# Patient Record
Sex: MALE | Race: WHITE | NOT HISPANIC OR LATINO | Employment: OTHER | URBAN - METROPOLITAN AREA
[De-identification: names, ages, dates, MRNs, and addresses within clinical notes are randomized per-mention and may not be internally consistent; named-entity substitution may affect disease eponyms.]

---

## 2017-08-29 ENCOUNTER — TRANSCRIBE ORDERS (OUTPATIENT)
Dept: ADMINISTRATIVE | Facility: HOSPITAL | Age: 47
End: 2017-08-29

## 2017-08-29 DIAGNOSIS — I42.9 FAMILIAL CARDIOMYOPATHY (HCC): Primary | ICD-10-CM

## 2017-08-30 DIAGNOSIS — I42.9 CARDIOMYOPATHY (HCC): ICD-10-CM

## 2017-09-08 ENCOUNTER — HOSPITAL ENCOUNTER (OUTPATIENT)
Dept: NON INVASIVE DIAGNOSTICS | Facility: CLINIC | Age: 47
Discharge: HOME | End: 2017-09-08
Payer: MEDICARE

## 2017-09-08 DIAGNOSIS — I42.9 CARDIOMYOPATHY (HCC): ICD-10-CM

## 2017-09-08 PROCEDURE — 93306 TTE W/DOPPLER COMPLETE: CPT

## 2017-10-20 ENCOUNTER — ALLSCRIPTS OFFICE VISIT (OUTPATIENT)
Dept: OTHER | Facility: OTHER | Age: 47
End: 2017-10-20

## 2017-11-14 ENCOUNTER — ALLSCRIPTS OFFICE VISIT (OUTPATIENT)
Dept: OTHER | Facility: OTHER | Age: 47
End: 2017-11-14

## 2017-11-14 DIAGNOSIS — I49.3 VENTRICULAR PREMATURE DEPOLARIZATION: ICD-10-CM

## 2017-11-14 DIAGNOSIS — I42.9 CARDIOMYOPATHY (HCC): ICD-10-CM

## 2017-11-14 DIAGNOSIS — E78.5 HYPERLIPIDEMIA: ICD-10-CM

## 2017-11-14 DIAGNOSIS — E03.9 HYPOTHYROIDISM: ICD-10-CM
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
## 2017-11-19 ENCOUNTER — HOSPITAL ENCOUNTER (OUTPATIENT)
Dept: NON INVASIVE DIAGNOSTICS | Facility: HOSPITAL | Age: 47
Discharge: HOME | End: 2017-11-19
Payer: MEDICARE

## 2017-11-19 DIAGNOSIS — I42.9 CARDIOMYOPATHY (HCC): ICD-10-CM

## 2017-11-19 DIAGNOSIS — E03.9 HYPOTHYROIDISM: ICD-10-CM

## 2017-11-19 DIAGNOSIS — E78.5 HYPERLIPIDEMIA: ICD-10-CM

## 2017-11-19 DIAGNOSIS — I49.3 VENTRICULAR PREMATURE DEPOLARIZATION: ICD-10-CM

## 2017-11-19 PROCEDURE — 93225 XTRNL ECG REC<48 HRS REC: CPT

## 2017-11-19 PROCEDURE — 93226 XTRNL ECG REC<48 HR SCAN A/R: CPT

## 2018-01-12 VITALS
SYSTOLIC BLOOD PRESSURE: 102 MMHG | HEIGHT: 69 IN | BODY MASS INDEX: 33.03 KG/M2 | DIASTOLIC BLOOD PRESSURE: 58 MMHG | WEIGHT: 223 LBS | HEART RATE: 60 BPM

## 2018-01-15 NOTE — PROGRESS NOTES
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)   6  Herniated cervical disc (722 0) (M50 20)   7  Hyperlipidemia (272 4) (E78 5)   8  Hypothyroidism (244 9) (E03 9)   9  Myelopathy (336 9) (G95 9)   10  Neck Pain (305 90)   11  Other specified aftercare following surgery (V58 49) (Z48 89)   12  Postoperative examination (V67 00) (Z09)   13  Premature ventricular contraction (427 69) (I49 3)   14  Spondylosis of cervical region without myelopathy or radiculopathy (721 0) (M47 812)   15  Thyroid disorder (246 9) (E07 9)    Current Meds   1  Aspirin 81 MG TABS; TAKE 1 TABLET DAILY; Therapy: (Recorded:18Pwo9536) to Recorded   2  Baclofen 10 MG Oral Tablet; TAKE 1 TABLET 3 TIMES DAILY  Requested for: 93VFI9419; Last Rx:19Jun2014 Ordered   3  Carvedilol 25 MG Oral Tablet; TAKE ONE TABLET BY MOUTH TWICE DAILY WITH    MORNING  AND  EVENING  MEALS; Therapy: 66MWG2377 to 944 12 109)  Requested for: 93VOD1734; Last   Rx:39Ymi5192 Ordered   4  Daily Multiple Vitamins Oral Tablet; TAKE 1 TABLET DAILY; Therapy: (Recorded:50Eyr0819) to Recorded   5  Levothyroxine Sodium 125 MCG Oral Tablet; TAKE 1 TABLET DAILY; Therapy: (Recorded:33Wkw0540) to Recorded   6  Losartan Potassium 25 MG Oral Tablet; TAKE 1 TABLET DAILY  Requested for:   33LJO1139; Last Rx:14Idx3906 Ordered   7  Omega-3-acid Ethyl Esters 1 GM Oral Capsule; TAKE 1 CAPSULES TWICE DAILY    Requested for: 51FMS2511; Last Rx:15Elo1808 Ordered   8   Tylenol Extra Strength 500 MG Oral Tablet; TAKE 1 TABLET EVERY 4 TO 6 HOURS AS   NEEDED; Therapy: (Recorded:23Jro1197) to Recorded    Allergies    1   Darvocet A500 TABS    Vitals  Signs    Heart Rate: 55  Systolic: 92, LUE, Sitting  Diastolic: 64, LUE, Sitting  Height: 5 ft 9 in  Weight: 219 lb   BMI Calculated: 32 34  BSA Calculated: 2 15  O2 Saturation: 99    Plan  Premature ventricular contraction    · EKG/ECG- POC; Status:Complete;   Done: 85FPN5194    Future Appointments    Date/Time Provider Specialty Site   11/14/2017 10:00 AM Jason Guzman DO Cardiology [facility]     Signatures   Electronically signed by : Trina Santiago, ; Oct 20 2017  9:49AM EST                       (Author)    Electronically signed by : Kelly Brito DO; Oct 20 2017  3:17PM EST                       (Author)

## 2018-01-22 VITALS
DIASTOLIC BLOOD PRESSURE: 64 MMHG | HEIGHT: 69 IN | WEIGHT: 219 LBS | OXYGEN SATURATION: 99 % | BODY MASS INDEX: 32.44 KG/M2 | SYSTOLIC BLOOD PRESSURE: 92 MMHG | HEART RATE: 55 BPM

## 2018-01-30 ENCOUNTER — TELEPHONE (OUTPATIENT)
Dept: CARDIOLOGY CLINIC | Facility: CLINIC | Age: 48
End: 2018-01-30

## 2018-01-30 NOTE — TELEPHONE ENCOUNTER
Nay Hickman called, stated he has read that Theola Shone oil will help with body aches and pains  Currently taking Lovaza  He is asking if he can change to the krill oil caps  Also, when on the elliptical machine, asking what his target heart rate should be  Please advise

## 2018-01-31 ENCOUNTER — TELEPHONE (OUTPATIENT)
Dept: CARDIOLOGY CLINIC | Facility: CLINIC | Age: 48
End: 2018-01-31

## 2018-01-31 NOTE — TELEPHONE ENCOUNTER
Patient wants to know how much on elipitical heart rate work out, and to change fish oil to [employer] instead

## 2018-01-31 NOTE — TELEPHONE ENCOUNTER
Can change to krill oil  Should keep activity to moderate on elliptical   Record HR during activity and send to me for review  Any Sx?

## 2018-02-02 NOTE — TELEPHONE ENCOUNTER
I spoke with Pauline Pollack today  His HR reaches 110-120 during 30 minutes on the elliptical machine  He has no symptoms  He feels the exercise on the elliptical really helps the pain in his feet and legs from his spinal cord injury, so he would like to continue if you are ok with the HR  He is also asking how many krill oil tabs tabs to take  The damien red krill oil are 350 mg and contain 90 mg of omega 3, and the fish oil tabs are 2000 mg and each contains 750 mg of omega 3  He currently takes 2000 mg of the fish oil bid    He is willing to combine both types if it means taking less pills

## 2018-02-02 NOTE — TELEPHONE ENCOUNTER
The heart rate is fine  I don't normally use krill oil so I don't have a specific dose for him to take  This was his idea    Thanks Eloina Schafer

## 2018-04-19 ENCOUNTER — OFFICE VISIT (OUTPATIENT)
Dept: CARDIOLOGY CLINIC | Facility: CLINIC | Age: 48
End: 2018-04-19
Payer: MEDICARE

## 2018-04-19 VITALS
DIASTOLIC BLOOD PRESSURE: 74 MMHG | BODY MASS INDEX: 34.6 KG/M2 | WEIGHT: 233.6 LBS | HEIGHT: 69 IN | HEART RATE: 64 BPM | SYSTOLIC BLOOD PRESSURE: 102 MMHG

## 2018-04-19 DIAGNOSIS — I42.0 DILATED CARDIOMYOPATHY (HCC): Primary | ICD-10-CM

## 2018-04-19 DIAGNOSIS — I49.3 PREMATURE VENTRICULAR CONTRACTION: ICD-10-CM

## 2018-04-19 DIAGNOSIS — E78.2 MIXED HYPERLIPIDEMIA: ICD-10-CM

## 2018-04-19 PROCEDURE — 99214 OFFICE O/P EST MOD 30 MIN: CPT | Performed by: INTERNAL MEDICINE

## 2018-04-19 RX ORDER — LEVOTHYROXINE SODIUM 0.12 MG/1
1 TABLET ORAL DAILY
COMMUNITY

## 2018-04-19 RX ORDER — ACETAMINOPHEN 500 MG
1 TABLET ORAL EVERY 6 HOURS PRN
COMMUNITY

## 2018-04-19 RX ORDER — BACLOFEN 10 MG/1
TABLET ORAL
Refills: 3 | COMMUNITY
Start: 2018-03-01

## 2018-04-19 RX ORDER — LOSARTAN POTASSIUM 25 MG/1
1 TABLET ORAL DAILY
COMMUNITY

## 2018-04-19 RX ORDER — CARVEDILOL 25 MG/1
TABLET ORAL 2 TIMES DAILY
COMMUNITY
Start: 2015-07-07

## 2018-04-19 NOTE — PROGRESS NOTES
Cardiology Follow Up    Zeus Becerra  1970  [id number]  HEART & VASCULAR [facility]ire  [facility]  [address]    1  Dilated cardiomyopathy ([address] )  Echo complete with contrast if indicated   2  Premature ventricular contraction     3  Mixed hyperlipidemia         Discussion/Summary: overall doing well from a cardiac standpoint  Recent Holter monitor shows PVC burden at 20% which is down from 30%  Ejection fraction has been mildly reduced for several years  Continue current dose led on get a copy of his most recent blood work  carvedilol and losartan  Follow-up echo in September of this year  If ejection fraction drops any further may need to consider repeat PVC ablation  He tells me when he had his original ablation they could get all the PVCs  There was a site close to the aorta that they felt was too risky to ablate  Blood pressure is well control    Interval History:   Follow-up visit  He has a history of nonischemic cardiomyopathy ejection fraction 50%  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  Underwent ablation but was unable to get all the PVCs secondary to a site that was close to the aorta  Overall he has been feeling well offers no complaints  Denies any chest pain, shortness of breath, palpitations, lightheadedness, dizziness, or syncope  There has been no lower extremity edema, PND, orthopnea  He has been taking all medications as prescribed  Problem List     Cardiomyopathy [facility])    Overview Signed 4/19/2018  8:02 AM by Saad Richard DO     Description: Nonischemic cardiomyopathy, most recent ejection fraction 53%         Hyperlipidemia    Premature ventricular contraction        No past medical history on file    Social History     Social History    Marital status: /Civil Union     Spouse name: N/A    Number of children: N/A    Years of education: N/A     Occupational History    Not on file  Social History Main Topics    Smoking status: Not on file    Smokeless tobacco: Not on file    Alcohol use Not on file    Drug use: Unknown    Sexual activity: Not on file     Other Topics Concern    Not on file     Social History Narrative    No narrative on file      No family history on file  No past surgical history on file  Current Outpatient Prescriptions:     acetaminophen (TYLENOL) 500 mg tablet, Take 1 tablet by mouth, Disp: , Rfl:     aspirin 81 MG tablet, Take 1 tablet by mouth daily, Disp: , Rfl:     baclofen 10 mg tablet, TAKE 1 TABLET BY MOUTH 3 TIMES A DAY FOR SPASMS, Disp: , Rfl: 3    carvedilol (COREG) 25 mg tablet, Take by mouth Twice daily, Disp: , Rfl:     DAILY MULTIPLE VITAMINS PO, Take 1 tablet by mouth daily, Disp: , Rfl:     levothyroxine 125 mcg tablet, Take 1 tablet by mouth daily, Disp: , Rfl:     losartan (COZAAR) 25 mg tablet, Take 1 tablet by mouth daily, Disp: , Rfl:     Omega-3 Fatty Acids (FISH OIL PO), Take 2 g by mouth, Disp: , Rfl:   Allergies   Allergen Reactions    Other      darvocet   Oxycodone       Labs:     Chemistry        Component Value Date/Time     03/22/2014 0427    K 4 0 03/22/2014 0427     03/22/2014 0427    CO2 26 03/22/2014 0427    BUN 15 03/22/2014 0427    CREATININE 0 89 03/22/2014 0427        Component Value Date/Time    CALCIUM 9 3 03/22/2014 0427    ALKPHOS 45 (L) 03/21/2014 1634    AST 21 03/21/2014 1634    ALT 31 03/21/2014 1634    BILITOT 0 65 03/21/2014 1634            Lab Results   Component Value Date    CHOL 172 08/08/2016     Lab Results   Component Value Date    HDL 37 (L) 08/08/2016     No results found for: [address]  Lab Results   Component Value Date    TRIG 106 08/08/2016     No components found for: CHOLHDL    Imaging: No results found      ECG:        ROS    Vitals:    04/19/18 1315   BP: 102/74   Pulse: 64     Vitals:    04/19/18 1315   Weight: 106 kg (233 lb 9 6 oz)     Height: 5' 9" (175 3 cm) Body mass index is 34 5 kg/m²  Physical Exam:   Vital signs reviewed    General appearance:  Appears stated age, alert, well appearing and in no distress  HEENT:  PERRLA, EOMI, no scleral icterus, no conjunctival pallor  NECK:  Supple, No elevated JVP, no thyromegaly, no carotid bruits  HEART:  Regular rate and rhythm, normal S1/S2, no S3/S4, no murmur or rub  LUNGS:  Clear to auscultation bilaterally, no wheezes rales or rhonchi  ABDOMEN:  Soft, non-tender, positive bowel sounds, no rebound or guarding, no organomegaly   EXTREMITIES:  No edema, normal range of motion  VASCULAR:  Normal pedal pulses, good pulse volume   SKIN: No lesions or rashes on exposed skin  NEURO:  CN II-XII intact, no focal deficits

## 2018-09-06 ENCOUNTER — HOSPITAL ENCOUNTER (OUTPATIENT)
Dept: NON INVASIVE DIAGNOSTICS | Facility: CLINIC | Age: 48
Discharge: HOME | End: 2018-09-06
Payer: MEDICARE

## 2018-09-06 DIAGNOSIS — I42.0 DILATED CARDIOMYOPATHY (HCC): ICD-10-CM

## 2018-09-06 PROCEDURE — 93306 TTE W/DOPPLER COMPLETE: CPT

## 2018-09-06 PROCEDURE — 93306 TTE W/DOPPLER COMPLETE: CPT | Performed by: INTERNAL MEDICINE

## 2019-01-24 ENCOUNTER — OFFICE VISIT (OUTPATIENT)
Dept: CARDIOLOGY CLINIC | Facility: CLINIC | Age: 49
End: 2019-01-24
Payer: MEDICARE

## 2019-01-24 VITALS
HEART RATE: 71 BPM | DIASTOLIC BLOOD PRESSURE: 68 MMHG | WEIGHT: 234.5 LBS | SYSTOLIC BLOOD PRESSURE: 100 MMHG | BODY MASS INDEX: 34.73 KG/M2 | HEIGHT: 69 IN

## 2019-01-24 DIAGNOSIS — E78.2 MIXED HYPERLIPIDEMIA: ICD-10-CM

## 2019-01-24 DIAGNOSIS — I49.3 PREMATURE VENTRICULAR CONTRACTION: ICD-10-CM

## 2019-01-24 DIAGNOSIS — I42.0 DILATED CARDIOMYOPATHY (HCC): Primary | ICD-10-CM

## 2019-01-24 PROCEDURE — 99214 OFFICE O/P EST MOD 30 MIN: CPT | Performed by: INTERNAL MEDICINE

## 2019-01-24 PROCEDURE — 93000 ELECTROCARDIOGRAM COMPLETE: CPT | Performed by: INTERNAL MEDICINE

## 2019-01-24 NOTE — PROGRESS NOTES
Cardiology Follow Up    Kendrick Griffin  1970  [id number]  HEART & VASCULAR [facility]  [address] [place] [address] [address]    1  Dilated cardiomyopathy (HCC)  Holter monitor - 24 hour    Echo follow up/limited   2  Premature ventricular contraction  POCT ECG    Holter monitor - 24 hour    Echo follow up/limited   3  Mixed hyperlipidemia         Discussion/Summary:  Overall he has been doing well with good functional capacity  I have ordered a Holter monitor to to assess PVC burden echocardiogram showed ejection fraction slightly reduced from 45% down to 40%  Will do six-month follow-up if PVC burden is going up and LV function is trending downward will need to be considered for another PVC ablation  Interval History:   Follow-up visit  He has a history of nonischemic cardiomyopathy ejection fraction 50%  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  Underwent ablation but was unable to get all the PVCs secondary to a site that was close to the aorta  Overall he has been feeling well offers no complaints  Denies any chest pain, shortness of breath, palpitations, lightheadedness, dizziness, or syncope  There has been no lower extremity edema, PND, orthopnea  He has been taking all medications as prescribed  He remains very active since her last visit  He has recently been out hunting walking in the woods with no exertional limitations  He denies any chest pain or discomfort, palpitations, lightheadedness, dizziness, or syncope  His been no lower extremity edema, PND, orthopnea  He has been taking all medications as prescribed  Recent echo was reviewed with the patient      Problem List     Cardiomyopathy [facility])    Overview Signed 4/19/2018  8:02 AM by Kelby Acevedo DO     Description: Nonischemic cardiomyopathy, most recent ejection fraction 53%         Hyperlipidemia    Premature ventricular contraction        No past medical history on file  Social History     Social History    Marital status: /Civil Union     Spouse name: N/A    Number of children: N/A    Years of education: N/A     Occupational History    Not on file  Social History Main Topics    Smoking status: Not on file    Smokeless tobacco: Not on file    Alcohol use Not on file    Drug use: Unknown    Sexual activity: Not on file     Other Topics Concern    Not on file     Social History Narrative    No narrative on file      No family history on file  No past surgical history on file      Current Outpatient Prescriptions:     acetaminophen (TYLENOL) 500 mg tablet, Take 1 tablet by mouth every 6 (six) hours as needed  , Disp: , Rfl:     aspirin 81 MG tablet, Take 1 tablet by mouth daily, Disp: , Rfl:     baclofen 10 mg tablet, TAKE 1 TABLET BY MOUTH 3 TIMES A DAY FOR SPASMS, Disp: , Rfl: 3    carvedilol (COREG) 25 mg tablet, Take by mouth Twice daily, Disp: , Rfl:     DAILY MULTIPLE VITAMINS PO, Take 1 tablet by mouth daily, Disp: , Rfl:     levothyroxine 125 mcg tablet, Take 1 tablet by mouth daily, Disp: , Rfl:     losartan (COZAAR) 25 mg tablet, Take 1 tablet by mouth daily, Disp: , Rfl:     Omega-3 Fatty Acids (FISH OIL PO), Take 2 g by mouth 2 (two) times a day  , Disp: , Rfl:   Allergies   Allergen Reactions    Other      darvocet   Oxycodone       Labs:     Chemistry        Component Value Date/Time     03/22/2014 0427    K 4 0 03/22/2014 0427     03/22/2014 0427    CO2 26 03/22/2014 0427    BUN 15 03/22/2014 0427    CREATININE 0 89 03/22/2014 0427        Component Value Date/Time    CALCIUM 9 3 03/22/2014 0427    ALKPHOS 45 (L) 03/21/2014 1634    AST 21 03/21/2014 1634    ALT 31 03/21/2014 1634    BILITOT 0 65 03/21/2014 1634            Lab Results   Component Value Date    CHOL 172 08/08/2016     Lab Results   Component Value Date    HDL 37 (L) 08/08/2016     No results found for: [address]  Lab Results   Component Value Date TRIG 106 08/08/2016     No results found for: CHOLHDL    Imaging: No results found  ECG:  Sinus rhythm right bundle left anterior fascicular block PVCs      Review of Systems   Constitution: Negative  HENT: Negative  Eyes: Negative  Cardiovascular: Negative  Respiratory: Negative  Endocrine: Negative  Hematologic/Lymphatic: Negative  Skin: Negative  Musculoskeletal: Negative  Gastrointestinal: Negative  Genitourinary: Negative  Neurological: Negative  Psychiatric/Behavioral: Negative  Vitals:    01/24/19 0856   BP: 100/68   Pulse: 71     Vitals:    01/24/19 0856   Weight: 106 kg (234 lb 8 oz)     Height: 5' 9" (175 3 cm)   Body mass index is 34 63 kg/m²  Physical Exam:   Vital signs reviewed    General appearance:  Appears stated age, alert, well appearing and in no distress  HEENT:  PERRLA, EOMI, no scleral icterus, no conjunctival pallor  NECK:  Supple, No elevated JVP, no thyromegaly, no carotid bruits  HEART:  Regular rate and rhythm positive S1/S2 no murmurs rubs or gallops  LUNGS:  Clear to auscultation bilaterally, no wheezes rales or rhonchi  ABDOMEN:  Soft, non-tender, positive bowel sounds, no rebound or guarding, no organomegaly   EXTREMITIES:  No edema, normal range of motion  VASCULAR:  Normal pedal pulses, good pulse volume   SKIN: No lesions or rashes on exposed skin  NEURO:  CN II-XII intact, no focal deficits

## 2019-02-19 ENCOUNTER — HOSPITAL ENCOUNTER (OUTPATIENT)
Dept: NON INVASIVE DIAGNOSTICS | Facility: CLINIC | Age: 49
Discharge: HOME | End: 2019-02-19
Payer: MEDICARE

## 2019-02-19 DIAGNOSIS — I42.0 DILATED CARDIOMYOPATHY (HCC): ICD-10-CM

## 2019-02-19 DIAGNOSIS — I49.3 PREMATURE VENTRICULAR CONTRACTION: ICD-10-CM

## 2019-02-19 PROCEDURE — 93321 DOPPLER ECHO F-UP/LMTD STD: CPT | Performed by: INTERNAL MEDICINE

## 2019-02-19 PROCEDURE — 93308 TTE F-UP OR LMTD: CPT | Performed by: INTERNAL MEDICINE

## 2019-02-19 PROCEDURE — 93225 XTRNL ECG REC<48 HRS REC: CPT

## 2019-02-19 PROCEDURE — 93325 DOPPLER ECHO COLOR FLOW MAPG: CPT | Performed by: INTERNAL MEDICINE

## 2019-02-19 PROCEDURE — 93226 XTRNL ECG REC<48 HR SCAN A/R: CPT

## 2019-02-19 PROCEDURE — 93308 TTE F-UP OR LMTD: CPT

## 2019-02-25 PROCEDURE — 93227 XTRNL ECG REC<48 HR R&I: CPT | Performed by: INTERNAL MEDICINE

## 2019-05-01 ENCOUNTER — TRANSCRIBE ORDERS (OUTPATIENT)
Dept: LAB | Age: 49
End: 2019-05-01

## 2019-05-01 ENCOUNTER — APPOINTMENT (OUTPATIENT)
Dept: LAB | Age: 49
End: 2019-05-01
Payer: MEDICARE

## 2019-05-01 DIAGNOSIS — Z12.5 SPECIAL SCREENING FOR MALIGNANT NEOPLASM OF PROSTATE: ICD-10-CM

## 2019-05-01 DIAGNOSIS — Z00.00 ROUTINE GENERAL MEDICAL EXAMINATION AT A HEALTH CARE FACILITY: Primary | ICD-10-CM

## 2019-05-01 DIAGNOSIS — E78.2 MIXED HYPERLIPIDEMIA: ICD-10-CM

## 2019-05-01 DIAGNOSIS — Z00.00 ROUTINE GENERAL MEDICAL EXAMINATION AT A HEALTH CARE FACILITY: ICD-10-CM

## 2019-05-01 DIAGNOSIS — I51.9 MYXEDEMA HEART DISEASE: ICD-10-CM

## 2019-05-01 DIAGNOSIS — E03.9 MYXEDEMA HEART DISEASE: ICD-10-CM

## 2019-05-01 LAB
ALBUMIN SERPL BCP-MCNC: 4.2 G/DL (ref 3.5–5)
ALP SERPL-CCNC: 38 U/L (ref 46–116)
ALT SERPL W P-5'-P-CCNC: 39 U/L (ref 12–78)
ANION GAP SERPL CALCULATED.3IONS-SCNC: 5 MMOL/L (ref 4–13)
AST SERPL W P-5'-P-CCNC: 36 U/L (ref 5–45)
BASOPHILS # BLD AUTO: 0.03 THOUSANDS/ΜL (ref 0–0.1)
BASOPHILS NFR BLD AUTO: 1 % (ref 0–1)
BILIRUB SERPL-MCNC: 0.65 MG/DL (ref 0.2–1)
BUN SERPL-MCNC: 16 MG/DL (ref 5–25)
CALCIUM SERPL-MCNC: 8.9 MG/DL (ref 8.3–10.1)
CHLORIDE SERPL-SCNC: 105 MMOL/L (ref 100–108)
CHOLEST SERPL-MCNC: 216 MG/DL (ref 50–200)
CO2 SERPL-SCNC: 30 MMOL/L (ref 21–32)
CREAT SERPL-MCNC: 1.12 MG/DL (ref 0.6–1.3)
EOSINOPHIL # BLD AUTO: 0.17 THOUSAND/ΜL (ref 0–0.61)
EOSINOPHIL NFR BLD AUTO: 3 % (ref 0–6)
ERYTHROCYTE [DISTWIDTH] IN BLOOD BY AUTOMATED COUNT: 12.5 % (ref 11.6–15.1)
GFR SERPL CREATININE-BSD FRML MDRD: 77 ML/MIN/1.73SQ M
GLUCOSE P FAST SERPL-MCNC: 98 MG/DL (ref 65–99)
HCT VFR BLD AUTO: 48.2 % (ref 36.5–49.3)
HDLC SERPL-MCNC: 35 MG/DL (ref 40–60)
HGB BLD-MCNC: 16.6 G/DL (ref 12–17)
IMM GRANULOCYTES # BLD AUTO: 0.01 THOUSAND/UL (ref 0–0.2)
IMM GRANULOCYTES NFR BLD AUTO: 0 % (ref 0–2)
LDLC SERPL CALC-MCNC: 115 MG/DL (ref 0–100)
LYMPHOCYTES # BLD AUTO: 2.26 THOUSANDS/ΜL (ref 0.6–4.47)
LYMPHOCYTES NFR BLD AUTO: 46 % (ref 14–44)
MCH RBC QN AUTO: 31.9 PG (ref 26.8–34.3)
MCHC RBC AUTO-ENTMCNC: 34.4 G/DL (ref 31.4–37.4)
MCV RBC AUTO: 93 FL (ref 82–98)
MONOCYTES # BLD AUTO: 0.62 THOUSAND/ΜL (ref 0.17–1.22)
MONOCYTES NFR BLD AUTO: 13 % (ref 4–12)
NEUTROPHILS # BLD AUTO: 1.84 THOUSANDS/ΜL (ref 1.85–7.62)
NEUTS SEG NFR BLD AUTO: 37 % (ref 43–75)
NONHDLC SERPL-MCNC: 181 MG/DL
NRBC BLD AUTO-RTO: 0 /100 WBCS
PLATELET # BLD AUTO: 145 THOUSANDS/UL (ref 149–390)
PMV BLD AUTO: 11.7 FL (ref 8.9–12.7)
POTASSIUM SERPL-SCNC: 4.2 MMOL/L (ref 3.5–5.3)
PROT SERPL-MCNC: 7.4 G/DL (ref 6.4–8.2)
PSA SERPL-MCNC: 0.6 NG/ML (ref 0–4)
RBC # BLD AUTO: 5.21 MILLION/UL (ref 3.88–5.62)
SODIUM SERPL-SCNC: 140 MMOL/L (ref 136–145)
TRIGL SERPL-MCNC: 331 MG/DL
TSH SERPL DL<=0.05 MIU/L-ACNC: 1.13 UIU/ML (ref 0.36–3.74)
WBC # BLD AUTO: 4.93 THOUSAND/UL (ref 4.31–10.16)

## 2019-05-01 PROCEDURE — 85025 COMPLETE CBC W/AUTO DIFF WBC: CPT

## 2019-05-01 PROCEDURE — G0103 PSA SCREENING: HCPCS

## 2019-05-01 PROCEDURE — 80053 COMPREHEN METABOLIC PANEL: CPT

## 2019-05-01 PROCEDURE — 36415 COLL VENOUS BLD VENIPUNCTURE: CPT

## 2019-05-01 PROCEDURE — 80061 LIPID PANEL: CPT

## 2019-05-01 PROCEDURE — 84443 ASSAY THYROID STIM HORMONE: CPT

## 2019-05-20 ENCOUNTER — TELEPHONE (OUTPATIENT)
Dept: CARDIOLOGY CLINIC | Facility: CLINIC | Age: 49
End: 2019-05-20

## 2019-09-13 ENCOUNTER — OFFICE VISIT (OUTPATIENT)
Dept: CARDIOLOGY CLINIC | Facility: CLINIC | Age: 49
End: 2019-09-13
Payer: MEDICARE

## 2019-09-13 VITALS
DIASTOLIC BLOOD PRESSURE: 56 MMHG | WEIGHT: 215 LBS | HEIGHT: 69 IN | BODY MASS INDEX: 31.84 KG/M2 | HEART RATE: 52 BPM | SYSTOLIC BLOOD PRESSURE: 102 MMHG | OXYGEN SATURATION: 96 %

## 2019-09-13 DIAGNOSIS — E78.2 MIXED HYPERLIPIDEMIA: ICD-10-CM

## 2019-09-13 DIAGNOSIS — I49.3 PREMATURE VENTRICULAR CONTRACTION: ICD-10-CM

## 2019-09-13 DIAGNOSIS — I42.0 DILATED CARDIOMYOPATHY (HCC): Primary | ICD-10-CM

## 2019-09-13 PROCEDURE — 99214 OFFICE O/P EST MOD 30 MIN: CPT | Performed by: INTERNAL MEDICINE

## 2019-09-13 NOTE — PROGRESS NOTES
Cardiology Follow Up    Ervin [place]ville  1970  [id number]  HEART & VASCULAR [facility]  [address]  [address]    1  Dilated cardiomyopathy ([address] )  Echo follow up/limited   2  Premature ventricular contraction  Echo follow up/limited   3  Mixed hyperlipidemia         Discussion/Summary:  Overall he has been doing well with good functional capacity  I personally reviewed his prior echocardiogram I feels ejection fraction is 45% which has been close to his previous studies  We need another limited echo for 6 month intervals just for ejection fraction given the high burden PVCs we need to see if he is dropping  The other focus was unable to be ablated at [address] we may need to seek other options if his ejection fraction does continued to drop  Continue carvedilol and losartan  I will see him back in 6 months  Interval History:   Follow-up visit  He has a history of nonischemic cardiomyopathy ejection fraction 50%  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  Underwent ablation but was unable to get all the PVCs secondary to a site that was close to the aorta  Overall he has been feeling well offers no complaints  Denies any chest pain, shortness of breath, palpitations, lightheadedness, dizziness, or syncope  There has been no lower extremity edema, PND, orthopnea  He has been taking all medications as prescribed  Overall he has been feeling well with no complaints  Follow-up today for 6 month interval check on ejection fraction  Denies any chest pain, shortness of breath, palpitations, lightheadedness, dizziness, or syncope  There has been no lower extremity edema, PND, orthopnea      Problem List     Cardiomyopathy [facility])    Overview Signed 4/19/2018  8:02 AM by Vish Britt DO     Description: Nonischemic cardiomyopathy, most recent ejection fraction 53%         Hyperlipidemia    Premature ventricular contraction        Past Medical History:   Diagnosis Date    CHF (congestive heart failure) ([facility])      Social History     Socioeconomic History    Marital status: /Civil Union     Spouse name: Not on file    Number of children: Not on file    Years of education: Not on file    Highest education level: Not on file   Occupational History    Not on file   Social Needs    Financial resource strain: Not on file    Food insecurity:     Worry: Not on file     Inability: Not on file    Transportation needs:     Medical: Not on file     Non-medical: Not on file   Tobacco Use    Smoking status: Never Smoker    Smokeless tobacco: Never Used   Substance and Sexual Activity    Alcohol use: Never     Frequency: Never    Drug use: Never    Sexual activity: Not on file   Lifestyle    Physical activity:     Days per week: Not on file     Minutes per session: Not on file    Stress: Not on file   Relationships    Social connections:     Talks on phone: Not on file     Gets together: Not on file     Attends Latter day service: Not on file     Active member of club or organization: Not on file     Attends meetings of clubs or organizations: Not on file     Relationship status: Not on file    Intimate partner violence:     Fear of current or ex partner: Not on file     Emotionally abused: Not on file     Physically abused: Not on file     Forced sexual activity: Not on file   Other Topics Concern    Not on file   Social History Narrative    Not on file      History reviewed  No pertinent family history  History reviewed  No pertinent surgical history      Current Outpatient Medications:     acetaminophen (TYLENOL) 500 mg tablet, Take 1 tablet by mouth every 6 (six) hours as needed  , Disp: , Rfl:     aspirin 81 MG tablet, Take 1 tablet by mouth daily, Disp: , Rfl:     baclofen 10 mg tablet, TAKE 1 TABLET BY MOUTH 3 TIMES A DAY FOR SPASMS, Disp: , Rfl: 3    carvedilol (COREG) 25 mg tablet, Take by mouth Twice daily, Disp: , Rfl:     DAILY MULTIPLE VITAMINS PO, Take 1 tablet by mouth daily, Disp: , Rfl:     levothyroxine 125 mcg tablet, Take 1 tablet by mouth daily, Disp: , Rfl:     losartan (COZAAR) 25 mg tablet, Take 1 tablet by mouth daily, Disp: , Rfl:     Omega-3 Fatty Acids (FISH OIL PO), Take 2 g by mouth 2 (two) times a day  , Disp: , Rfl:   Allergies   Allergen Reactions    Other      darvocet   Oxycodone       Labs:     Chemistry        Component Value Date/Time     03/22/2014 0427    K 4 2 05/01/2019 0844    K 4 0 03/22/2014 0427     05/01/2019 0844     03/22/2014 0427    CO2 30 05/01/2019 0844    CO2 26 03/22/2014 0427    BUN 16 05/01/2019 0844    BUN 15 03/22/2014 0427    CREATININE 1 12 05/01/2019 0844    CREATININE 0 89 03/22/2014 0427        Component Value Date/Time    CALCIUM 8 9 05/01/2019 0844    CALCIUM 9 3 03/22/2014 0427    ALKPHOS 38 (L) 05/01/2019 0844    ALKPHOS 45 (L) 03/21/2014 1634    AST 36 05/01/2019 0844    AST 21 03/21/2014 1634    ALT 39 05/01/2019 0844    ALT 31 03/21/2014 1634    BILITOT 0 65 03/21/2014 1634            Lab Results   Component Value Date    CHOL 172 08/08/2016     Lab Results   Component Value Date    HDL 35 (L) 05/01/2019    HDL 37 (L) 08/08/2016     Lab Results   Component Value Date    LDLCALC 115 (H) 05/01/2019     Lab Results   Component Value Date    TRIG 331 (H) 05/01/2019    TRIG 106 08/08/2016     No results found for: CHOLHDL    Imaging: No results found  Review of Systems   Constitution: Negative  HENT: Negative  Eyes: Negative  Cardiovascular: Negative  Respiratory: Negative  Endocrine: Negative  Hematologic/Lymphatic: Negative  Skin: Negative  Musculoskeletal: Negative  Gastrointestinal: Negative  Genitourinary: Negative  Neurological: Negative  Psychiatric/Behavioral: Negative          Vitals:    09/13/19 1300   BP: 102/56   Pulse: (!) 52   SpO2: 96%     Vitals:    09/13/19 1300 Weight: 97 5 kg (215 lb)     Height: 5' 9" (175 3 cm)   Body mass index is 31 75 kg/m²      Physical Exam:  General:  Alert and cooperative, appears stated age  HEENT:  PERRLA, EOMI, no scleral icterus, no conjunctival pallor  Neck:  No lymphadenopathy, no thyromegaly, no carotid bruits, no elevated JVP  Heart:  Regular rate and rhythm, normal S1/S2, no S3/S4, no murmur  Lungs:  Clear to auscultation bilaterally   Abdomen:  Soft, non-tender, positive bowel sounds, no rebound or guarding,   no organomegaly   Extremities:  No clubbing, cyanosis or edema   Vascular:  2+ pedal pulses  Skin:  No rashes or lesions on exposed skin  Neurologic:  Cranial nerves II-XII grossly intact without focal deficits

## 2019-10-22 ENCOUNTER — HOSPITAL ENCOUNTER (OUTPATIENT)
Dept: NON INVASIVE DIAGNOSTICS | Facility: CLINIC | Age: 49
Discharge: HOME | End: 2019-10-22
Payer: MEDICARE

## 2019-10-22 DIAGNOSIS — I49.3 PREMATURE VENTRICULAR CONTRACTION: ICD-10-CM

## 2019-10-22 DIAGNOSIS — I42.0 DILATED CARDIOMYOPATHY (HCC): ICD-10-CM

## 2019-10-22 PROCEDURE — 93325 DOPPLER ECHO COLOR FLOW MAPG: CPT | Performed by: INTERNAL MEDICINE

## 2019-10-22 PROCEDURE — 93308 TTE F-UP OR LMTD: CPT

## 2019-10-22 PROCEDURE — 93308 TTE F-UP OR LMTD: CPT | Performed by: INTERNAL MEDICINE

## 2019-10-22 PROCEDURE — 93321 DOPPLER ECHO F-UP/LMTD STD: CPT | Performed by: INTERNAL MEDICINE

## 2020-07-30 ENCOUNTER — OFFICE VISIT (OUTPATIENT)
Dept: CARDIOLOGY CLINIC | Facility: CLINIC | Age: 50
End: 2020-07-30
Payer: MEDICARE

## 2020-07-30 VITALS
WEIGHT: 203 LBS | HEART RATE: 62 BPM | TEMPERATURE: 97.3 F | BODY MASS INDEX: 30.07 KG/M2 | DIASTOLIC BLOOD PRESSURE: 60 MMHG | SYSTOLIC BLOOD PRESSURE: 98 MMHG | HEIGHT: 69 IN

## 2020-07-30 DIAGNOSIS — E78.2 MIXED HYPERLIPIDEMIA: ICD-10-CM

## 2020-07-30 DIAGNOSIS — I42.0 DILATED CARDIOMYOPATHY (HCC): Primary | ICD-10-CM

## 2020-07-30 DIAGNOSIS — I49.3 PREMATURE VENTRICULAR CONTRACTION: ICD-10-CM

## 2020-07-30 PROCEDURE — 93000 ELECTROCARDIOGRAM COMPLETE: CPT | Performed by: INTERNAL MEDICINE

## 2020-07-30 PROCEDURE — 99214 OFFICE O/P EST MOD 30 MIN: CPT | Performed by: INTERNAL MEDICINE

## 2020-07-30 RX ORDER — MELATONIN
1000 DAILY
COMMUNITY

## 2020-07-30 RX ORDER — ZINC GLUCONATE 50 MG
1 TABLET ORAL AS NEEDED
COMMUNITY

## 2020-07-30 NOTE — PROGRESS NOTES
Cardiology Follow Up    Deborah Cuevas  1970  [id number]  HEART & VASCULAR [facility] [place]  [address] [place] [address] [address]    1  Dilated cardiomyopathy ([address] )  POCT ECG    Echo complete with contrast if indicated   2  Premature ventricular contraction  POCT ECG    Echo complete with contrast if indicated   3  Mixed hyperlipidemia  Echo complete with contrast if indicated       Discussion/Summary:  Overall he has been feeling great since her last visit  He has lost about 15 lb and is doing well  Ejection fraction has been at 45% on recent echoes  He is due for follow-up this year  He has PVC burden of 22% historically prior ablations have shown that this last focus of PVCs is in a high risk area  Lungs ejection fraction remains stable would continue medical therapy and observation  Blood pressures been well controlled  Lipids have been stable will be due for blood work later this year  Continue to follow every 6 months  Interval History:   Follow-up visit  He has a history of nonischemic cardiomyopathy ejection fraction 50%  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  Underwent ablation but was unable to get all the PVCs secondary to a site that was close to the aorta  Since her last visit he has been doing quite well  He changed his dietary habits and has lost about 15 lb over the last 6 months  He denies any chest pain, shortness of breath, palpitations, lightheadedness, dizziness, or syncope  His been no lower extremity edema, PND, orthopnea  He is following a low-sodium diet  He had no heart failure symptoms    There has been no syncope in the past       Problem List     Cardiomyopathy [facility])    Overview Signed 4/19/2018  8:02 AM by Amor Kruse DO     Description: Nonischemic cardiomyopathy, most recent ejection fraction 53%         Hyperlipidemia    Premature ventricular contraction        Past Medical History: Diagnosis Date    CHF (congestive heart failure) ([facility])      Social History     Socioeconomic History    Marital status: /Civil Union     Spouse name: Not on file    Number of children: Not on file    Years of education: Not on file    Highest education level: Not on file   Occupational History    Not on file   Social Needs    Financial resource strain: Not on file    Food insecurity:     Worry: Not on file     Inability: Not on file    Transportation needs:     Medical: Not on file     Non-medical: Not on file   Tobacco Use    Smoking status: Never Smoker    Smokeless tobacco: Never Used   Substance and Sexual Activity    Alcohol use: Never     Frequency: Never    Drug use: Never    Sexual activity: Not on file   Lifestyle    Physical activity:     Days per week: Not on file     Minutes per session: Not on file    Stress: Not on file   Relationships    Social connections:     Talks on phone: Not on file     Gets together: Not on file     Attends Jain service: Not on file     Active member of club or organization: Not on file     Attends meetings of clubs or organizations: Not on file     Relationship status: Not on file    Intimate partner violence:     Fear of current or ex partner: Not on file     Emotionally abused: Not on file     Physically abused: Not on file     Forced sexual activity: Not on file   Other Topics Concern    Not on file   Social History Narrative    Not on file      History reviewed  No pertinent family history  History reviewed  No pertinent surgical history      Current Outpatient Medications:     acetaminophen (TYLENOL) 500 mg tablet, Take 1 tablet by mouth every 6 (six) hours as needed  , Disp: , Rfl:     aspirin 81 MG tablet, Take 1 tablet by mouth daily, Disp: , Rfl:     baclofen 10 mg tablet, TAKE 1 TABLET BY MOUTH 3 TIMES A DAY FOR SPASMS, Disp: , Rfl: 3    carvedilol (COREG) 25 mg tablet, Take by mouth Twice daily, Disp: , Rfl:     cholecalciferol (VITAMIN D3) 1,000 units tablet, Take 1,000 Units by mouth daily, Disp: , Rfl:     DAILY MULTIPLE VITAMINS PO, Take 1 tablet by mouth daily, Disp: , Rfl:     levothyroxine 125 mcg tablet, Take 1 tablet by mouth daily, Disp: , Rfl:     losartan (COZAAR) 25 mg tablet, Take 1 tablet by mouth daily, Disp: , Rfl:     Omega-3 Fatty Acids (FISH OIL PO), Take 2 g by mouth 2 (two) times a day  , Disp: , Rfl:     Zinc 50 MG TABS, Take 1 tablet by mouth as needed, Disp: , Rfl:   Allergies   Allergen Reactions    Other      darvocet   Oxycodone       Labs:     Chemistry        Component Value Date/Time     03/22/2014 0427    K 4 2 05/01/2019 0844    K 4 0 03/22/2014 0427     05/01/2019 0844     03/22/2014 0427    CO2 30 05/01/2019 0844    CO2 26 03/22/2014 0427    BUN 16 05/01/2019 0844    BUN 15 03/22/2014 0427    CREATININE 1 12 05/01/2019 0844    CREATININE 0 89 03/22/2014 0427        Component Value Date/Time    CALCIUM 8 9 05/01/2019 0844    CALCIUM 9 3 03/22/2014 0427    ALKPHOS 38 (L) 05/01/2019 0844    ALKPHOS 45 (L) 03/21/2014 1634    AST 36 05/01/2019 0844    AST 21 03/21/2014 1634    ALT 39 05/01/2019 0844    ALT 31 03/21/2014 1634    BILITOT 0 65 03/21/2014 1634            Lab Results   Component Value Date    CHOL 172 08/08/2016     Lab Results   Component Value Date    HDL 35 (L) 05/01/2019    HDL 37 (L) 08/08/2016     Lab Results   Component Value Date    LDLCALC 115 (H) 05/01/2019     Lab Results   Component Value Date    TRIG 331 (H) 05/01/2019    TRIG 106 08/08/2016     No results found for: CHOLHDL    Imaging: No results found  Review of Systems   Constitution: Negative  HENT: Negative  Eyes: Negative  Cardiovascular: Negative  Respiratory: Negative  Endocrine: Negative  Hematologic/Lymphatic: Negative  Skin: Negative  Musculoskeletal: Negative  Gastrointestinal: Negative  Genitourinary: Negative  Neurological: Negative  Psychiatric/Behavioral: Negative  All other systems reviewed and are negative  Vitals:    07/30/20 0955   BP: 98/60   Pulse: 62   Temp: (!) 97 3 °F (36 3 °C)     Vitals:    07/30/20 0955   Weight: 92 1 kg (203 lb)     Height: 5' 9" (175 3 cm)   Body mass index is 29 98 kg/m²  Physical Exam:  Vital signs reviewed  General:  Alert and cooperative, appears stated age, no acute distress  HEENT:  PERRLA, EOMI, no scleral icterus, no conjunctival pallor  Neck:  No lymphadenopathy, no thyromegaly, no carotid bruits, no elevated JVP  Heart:  Regular rate and rhythm, normal S1/S2, no S3/S4, no murmur, rubs or gallops  PMI nondisplaced  Lungs:  Clear to auscultation bilaterally, no wheezes rales or rhonchi  Abdomen:  Soft, non-tender, positive bowel sounds, no rebound or guarding,   no organomegaly   Extremities:  Normal range of motion    No clubbing, cyanosis or edema   Vascular:  2+ pedal pulses  Skin:  No rashes or lesions on exposed skin  Neurologic:  Cranial nerves II-XII grossly intact without focal deficits  Psych:  Normal mood and affect

## 2020-09-24 ENCOUNTER — HOSPITAL ENCOUNTER (OUTPATIENT)
Dept: NON INVASIVE DIAGNOSTICS | Facility: MEDICAL CENTER | Age: 50
Discharge: HOME | End: 2020-09-24
Payer: MEDICARE

## 2020-09-24 DIAGNOSIS — I42.0 DILATED CARDIOMYOPATHY (HCC): ICD-10-CM

## 2020-09-24 DIAGNOSIS — I49.3 PREMATURE VENTRICULAR CONTRACTION: ICD-10-CM

## 2020-09-24 DIAGNOSIS — E78.2 MIXED HYPERLIPIDEMIA: ICD-10-CM

## 2020-09-24 PROCEDURE — 93306 TTE W/DOPPLER COMPLETE: CPT | Performed by: INTERNAL MEDICINE

## 2020-09-24 PROCEDURE — 93306 TTE W/DOPPLER COMPLETE: CPT

## 2020-11-30 ENCOUNTER — TELEPHONE (OUTPATIENT)
Dept: CARDIOLOGY CLINIC | Facility: CLINIC | Age: 50
End: 2020-11-30

## 2021-01-21 ENCOUNTER — TRANSCRIBE ORDERS (OUTPATIENT)
Dept: NEUROSURGERY | Facility: CLINIC | Age: 51
End: 2021-01-21

## 2021-01-21 DIAGNOSIS — M54.50 LOWER BACK PAIN: Primary | ICD-10-CM

## 2021-03-10 DIAGNOSIS — Z23 ENCOUNTER FOR IMMUNIZATION: ICD-10-CM

## 2022-04-29 ENCOUNTER — APPOINTMENT (OUTPATIENT)
Dept: LAB | Age: 52
End: 2022-04-29
Payer: MEDICARE

## 2022-04-29 DIAGNOSIS — E03.9 MYXEDEMA HEART DISEASE: ICD-10-CM

## 2022-04-29 DIAGNOSIS — I51.9 MYXEDEMA HEART DISEASE: ICD-10-CM

## 2022-04-29 DIAGNOSIS — D51.8 OTHER VITAMIN B12 DEFICIENCY ANEMIA: ICD-10-CM

## 2022-04-29 DIAGNOSIS — Z00.00 ROUTINE GENERAL MEDICAL EXAMINATION AT A HEALTH CARE FACILITY: ICD-10-CM

## 2022-04-29 DIAGNOSIS — Z12.5 SPECIAL SCREENING FOR MALIGNANT NEOPLASM OF PROSTATE: ICD-10-CM

## 2022-04-29 LAB
ALBUMIN SERPL BCP-MCNC: 3.9 G/DL (ref 3.5–5)
ALP SERPL-CCNC: 37 U/L (ref 46–116)
ALT SERPL W P-5'-P-CCNC: 31 U/L (ref 12–78)
ANION GAP SERPL CALCULATED.3IONS-SCNC: -1 MMOL/L (ref 4–13)
AST SERPL W P-5'-P-CCNC: 33 U/L (ref 5–45)
BASOPHILS # BLD AUTO: 0.03 THOUSANDS/ΜL (ref 0–0.1)
BASOPHILS NFR BLD AUTO: 1 % (ref 0–1)
BILIRUB SERPL-MCNC: 1.23 MG/DL (ref 0.2–1)
BUN SERPL-MCNC: 16 MG/DL (ref 5–25)
CALCIUM SERPL-MCNC: 9.4 MG/DL (ref 8.3–10.1)
CHLORIDE SERPL-SCNC: 108 MMOL/L (ref 100–108)
CHOLEST SERPL-MCNC: 188 MG/DL
CO2 SERPL-SCNC: 30 MMOL/L (ref 21–32)
CREAT SERPL-MCNC: 1.04 MG/DL (ref 0.6–1.3)
EOSINOPHIL # BLD AUTO: 0.13 THOUSAND/ΜL (ref 0–0.61)
EOSINOPHIL NFR BLD AUTO: 3 % (ref 0–6)
ERYTHROCYTE [DISTWIDTH] IN BLOOD BY AUTOMATED COUNT: 12.9 % (ref 11.6–15.1)
GFR SERPL CREATININE-BSD FRML MDRD: 82 ML/MIN/1.73SQ M
GLUCOSE P FAST SERPL-MCNC: 97 MG/DL (ref 65–99)
HCT VFR BLD AUTO: 46 % (ref 36.5–49.3)
HDLC SERPL-MCNC: 39 MG/DL
HGB BLD-MCNC: 15.4 G/DL (ref 12–17)
IMM GRANULOCYTES # BLD AUTO: 0.01 THOUSAND/UL (ref 0–0.2)
IMM GRANULOCYTES NFR BLD AUTO: 0 % (ref 0–2)
LDLC SERPL CALC-MCNC: 132 MG/DL (ref 0–100)
LYMPHOCYTES # BLD AUTO: 1.92 THOUSANDS/ΜL (ref 0.6–4.47)
LYMPHOCYTES NFR BLD AUTO: 47 % (ref 14–44)
MCH RBC QN AUTO: 31.4 PG (ref 26.8–34.3)
MCHC RBC AUTO-ENTMCNC: 33.5 G/DL (ref 31.4–37.4)
MCV RBC AUTO: 94 FL (ref 82–98)
MONOCYTES # BLD AUTO: 0.55 THOUSAND/ΜL (ref 0.17–1.22)
MONOCYTES NFR BLD AUTO: 13 % (ref 4–12)
NEUTROPHILS # BLD AUTO: 1.47 THOUSANDS/ΜL (ref 1.85–7.62)
NEUTS SEG NFR BLD AUTO: 36 % (ref 43–75)
NONHDLC SERPL-MCNC: 149 MG/DL
NRBC BLD AUTO-RTO: 0 /100 WBCS
PLATELET # BLD AUTO: 145 THOUSANDS/UL (ref 149–390)
PMV BLD AUTO: 11.2 FL (ref 8.9–12.7)
POTASSIUM SERPL-SCNC: 4.7 MMOL/L (ref 3.5–5.3)
PROT SERPL-MCNC: 7.2 G/DL (ref 6.4–8.2)
PSA SERPL-MCNC: 0.7 NG/ML (ref 0–4)
RBC # BLD AUTO: 4.91 MILLION/UL (ref 3.88–5.62)
SODIUM SERPL-SCNC: 137 MMOL/L (ref 136–145)
T4 SERPL-MCNC: 9.9 UG/DL (ref 4.7–13.3)
TRIGL SERPL-MCNC: 86 MG/DL
TSH SERPL DL<=0.05 MIU/L-ACNC: 1.41 UIU/ML (ref 0.45–4.5)
VIT B12 SERPL-MCNC: 438 PG/ML (ref 100–900)
WBC # BLD AUTO: 4.11 THOUSAND/UL (ref 4.31–10.16)

## 2022-04-29 PROCEDURE — 80061 LIPID PANEL: CPT

## 2022-04-29 PROCEDURE — 85025 COMPLETE CBC W/AUTO DIFF WBC: CPT

## 2022-04-29 PROCEDURE — 84436 ASSAY OF TOTAL THYROXINE: CPT

## 2022-04-29 PROCEDURE — 36415 COLL VENOUS BLD VENIPUNCTURE: CPT

## 2022-04-29 PROCEDURE — 84443 ASSAY THYROID STIM HORMONE: CPT

## 2022-04-29 PROCEDURE — G0103 PSA SCREENING: HCPCS

## 2022-04-29 PROCEDURE — 80053 COMPREHEN METABOLIC PANEL: CPT

## 2022-04-29 PROCEDURE — 82607 VITAMIN B-12: CPT

## 2022-06-02 ENCOUNTER — HOSPITAL ENCOUNTER (OUTPATIENT)
Dept: RADIOLOGY | Facility: HOSPITAL | Age: 52
Discharge: HOME | End: 2022-06-02
Payer: MEDICARE

## 2022-06-02 ENCOUNTER — CONSULT (OUTPATIENT)
Dept: NEUROSURGERY | Facility: CLINIC | Age: 52
End: 2022-06-02
Payer: MEDICARE

## 2022-06-02 VITALS
HEART RATE: 56 BPM | HEIGHT: 69 IN | BODY MASS INDEX: 31.1 KG/M2 | TEMPERATURE: 98 F | WEIGHT: 210 LBS | SYSTOLIC BLOOD PRESSURE: 128 MMHG | DIASTOLIC BLOOD PRESSURE: 84 MMHG

## 2022-06-02 DIAGNOSIS — M54.50 LOWER BACK PAIN: Primary | ICD-10-CM

## 2022-06-02 DIAGNOSIS — M54.50 LOWER BACK PAIN: ICD-10-CM

## 2022-06-02 PROCEDURE — 72114 X-RAY EXAM L-S SPINE BENDING: CPT

## 2022-06-02 PROCEDURE — 99203 OFFICE O/P NEW LOW 30 MIN: CPT | Performed by: PHYSICIAN ASSISTANT

## 2022-06-02 RX ORDER — METHOCARBAMOL 750 MG/1
750 TABLET, FILM COATED ORAL EVERY 6 HOURS PRN
Qty: 20 TABLET | Refills: 0 | Status: SHIPPED | OUTPATIENT
Start: 2022-06-02

## 2022-06-02 NOTE — PROGRESS NOTES
Neurosurgery Office Note  Cathie Munoz 47 y o  male MRN: [id number]      Assessment/Plan     Lower back pain  Patient presents today as a new patient for evaluation low back pain   History of cervical stenosis and myelopathy describes patient's spinal cord injury which he ultimately required surgical intervention in March 2014    Continues with intermittent back pain with recent severe exacerbation despite use of baclofen, Tylenol and ongoing home physical therapy regimen  Plan:    Continue monitor for any progressive symptoms including radicular pain, numbness, tingling and or weakness or any bowel bladder dysfunction   Reviewed with patient pain may be multifactorial including musculoskeletal component verses facet arthropathy, stenosis or possible spondylolisthesis   Ordered lumbar standing x-rays to include flexion extension   Ordered MRI lumbar spine for further evaluation for etiology of patient's symptoms   Discussed with patient if there is no surgical indication noted on imaging, he will be directed back to formal physical therapy though he continues his home regiment as well as pain management   Patient may follow up with Dr Lisa Abad per the patient's request after completion of his imaging  Diagnoses and all orders for this visit:    Lower back pain  -     MRI lumbar spine without contrast; Future  -     XR spine lumbar complete w bending minimum 6 views; Future  -     methocarbamol (Robaxin-750) 750 mg tablet; Take 1 tablet (750 mg total) by mouth every 6 (six) hours as needed for muscle spasms          I spent 40 minutes with the patient today in which >50% of the time was spent counseling/coordination of care regarding diagnosis, imaging review, symptoms and treatment plan       CHIEF COMPLAINT    Chief Complaint   Patient presents with    Back Pain       HISTORY    History of Present Illness     This is a 50-year-old male past medical history significant for cervical stenosis with myelopathy status post ACDF C6 through T1 on March 21, 2014 with Dr Matheus Carreon who presents today with complaints of acute on chronic low back pain  Patient describes significant spastic movements especially of his right upper extremity several years ago for which he had extensive workup  He eventually was seen in our office and diagnosed with cervical cord compression and myelopathy  Patient failed conservative management and ultimately underwent surgical intervention  He made significant recovery and continues on baclofen to assist with involuntary movements  He completed extensive therapy both [facility] as well as a with a neuromuscular therapist   Patient admits to continuing his home exercise regimen  Patient admits to intermittent low back problems over the last eight years but concerned about significant exacerbation of his back pain  He states he was simply standing in the kitchen getting up check in when he had acute exacerbation of his low back pain  He states the pain is constant nonradiating causing limitation in his ability to stand or walk and exacerbated with bending or going up stairs as well as any prolonged activity or positioning  He continues with difficulties of his right lower extremity feeling as though it is giving out at times  Continues with baseline neuropathies  Denies any bowel bladder dysfunction  No recent follow-up with pain management  REVIEW OF SYSTEMS    Review of Systems   Constitutional: Negative  HENT: Negative  Eyes: Negative  Respiratory: Negative  Cardiovascular: Negative  H/o CHF   Gastrointestinal: Negative  Endocrine: Negative  Genitourinary: Negative  Musculoskeletal: Positive for back pain (LBP non radiating   Has not had a tramatic injuery  Difficullty bwalking, bending , standing ( only 3 min)  Difficulty going up the stairs), gait problem (uses cane) and myalgias          PT/ Pain Man-- NONE  On Baclofen -good relief   Skin: Negative  Allergic/Immunologic: Negative  Neurological: Positive for weakness (right leg gives out)  Negative for numbness  Hematological: Negative  Psychiatric/Behavioral: Positive for sleep disturbance (due to pain)  All other systems reviewed and are negative  ROS obtained by MA  Meds/Allergies     Current Outpatient Medications   Medication Sig Dispense Refill    methocarbamol (Robaxin-750) 750 mg tablet Take 1 tablet (750 mg total) by mouth every 6 (six) hours as needed for muscle spasms 20 tablet 0    acetaminophen (TYLENOL) 500 mg tablet Take 1 tablet by mouth every 6 (six) hours as needed        aspirin 81 MG tablet Take 1 tablet by mouth daily      baclofen 10 mg tablet TAKE 1 TABLET BY MOUTH 3 TIMES A DAY FOR SPASMS  3    carvedilol (COREG) 25 mg tablet Take by mouth Twice daily      cholecalciferol (VITAMIN D3) 1,000 units tablet Take 1,000 Units by mouth daily      DAILY MULTIPLE VITAMINS PO Take 1 tablet by mouth daily      levothyroxine 125 mcg tablet Take 1 tablet by mouth daily      losartan (COZAAR) 25 mg tablet Take 1 tablet by mouth daily      Omega-3 Fatty Acids (FISH OIL PO) Take 2 g by mouth 2 (two) times a day        Zinc 50 MG TABS Take 1 tablet by mouth as needed       No current facility-administered medications for this visit  Allergies   Allergen Reactions    Other      darvocet   Oxycodone       PAST HISTORY    Past Medical History:   Diagnosis Date    [facility]        History reviewed  No pertinent surgical history  Social History     Tobacco Use    Smoking status: Never Smoker    Smokeless tobacco: Never Used   Substance Use Topics    Alcohol use: Never    Drug use: Never       History reviewed  No pertinent family history  Above history personally reviewed         EXAM    Vitals:Blood pressure 128/84, pulse 56, temperature 98 °F (36 7 °C), temperature source Temporal, height 5' 9" (1 753 m), weight 95 3 kg (210 lb)  ,Body mass index is 31 01 kg/m²  Physical Exam  Constitutional:       General: He is not in acute distress  Appearance: Normal appearance  He is well-developed  HENT:      Head: Normocephalic and atraumatic  Eyes:      General: No scleral icterus  Right eye: No discharge  Left eye: No discharge  Extraocular Movements: EOM normal       Conjunctiva/sclera: Conjunctivae normal    Cardiovascular:      Rate and Rhythm: Normal rate  Pulmonary:      Effort: Pulmonary effort is normal  No respiratory distress  Abdominal:      General: There is no distension  Musculoskeletal:         General: No tenderness  Cervical back: Normal range of motion and neck supple  Skin:     General: Skin is warm and dry  Neurological:      Mental Status: He is alert  Deep Tendon Reflexes: Strength normal       Reflex Scores:       Bicep reflexes are 3+ on the right side and 3+ on the left side  Patellar reflexes are 2+ on the right side and 2+ on the left side  Psychiatric:         Mood and Affect: Mood normal          Behavior: Behavior normal          Thought Content: Thought content normal          Judgment: Judgment normal          Neurologic Exam     Mental Status   Follows 2 step commands  Attention: normal  Concentration: normal    Speech: (Slow at times and repeats thoughts)  Level of consciousness: alert  Knowledge: good  Normal comprehension  Cranial Nerves     CN III, IV, VI   Extraocular motions are normal    Conjugate gaze: present    CN VII   Facial expression full, symmetric  CN VIII   Hearing: intact    Motor Exam   Muscle bulk: normal  Overall muscle tone: normal    Strength   Strength 5/5 throughout       Sensory Exam   Light touch normal      Gait, Coordination, and Reflexes     Tremor   Resting tremor: absent  Intention tremor: absent  Action tremor: absent    Reflexes   Right biceps: 3+  Left biceps: 3+  Right patellar: 2+  Left patellar: 2+  Right Sims: absent  Left Sims: absent  Right ankle clonus: absent  Left ankle clonus: absent        MEDICAL DECISION MAKING    Imaging Studies:     none

## 2022-06-02 NOTE — ASSESSMENT & PLAN NOTE
Patient presents today as a new patient for evaluation low back pain   History of cervical stenosis and myelopathy describes patient's spinal cord injury which he ultimately required surgical intervention in March 2014    Continues with intermittent back pain with recent severe exacerbation despite use of baclofen, Tylenol and ongoing home physical therapy regimen  Plan:    Continue monitor for any progressive symptoms including radicular pain, numbness, tingling and or weakness or any bowel bladder dysfunction   Reviewed with patient pain may be multifactorial including musculoskeletal component verses facet arthropathy, stenosis or possible spondylolisthesis   Spinal anatomy reviewed with patient   Ordered lumbar standing x-rays to include flexion extension   Ordered MRI lumbar spine for further evaluation for etiology of patient's symptoms   Discussed with patient if there is no surgical indication noted on imaging, he will be directed back to formal physical therapy though he continues his home regiment as well as pain management   Patient may follow up with Dr David Srivastava per the patient's request after completion of his imaging

## 2022-06-06 ENCOUNTER — TELEPHONE (OUTPATIENT)
Dept: NEUROSURGERY | Facility: CLINIC | Age: 52
End: 2022-06-06

## 2022-06-06 NOTE — TELEPHONE ENCOUNTER
LVM returning patient's call about moving up his appointment  [facility] does not have any current openings in his schedule

## 2022-06-06 NOTE — TELEPHONE ENCOUNTER
Called in the script verbally to [facility]  Spoke with the pharmacist  She will process the script for the patient

## 2022-06-06 NOTE — TELEPHONE ENCOUNTER
Called [facility] and canceled script since this RN will be calling it into the [facility] as requested by the patient

## 2022-06-06 NOTE — TELEPHONE ENCOUNTER
Patient called the office and left a vm reporting how his pharmacy never received the robaxin script on 6/2/22  He requested for the script to be called into [facility] in [place] on [facility]  After this RN reviewed the chart, the script was originally sent to [facility] in [place], [place] on [address] on 6/2/22

## 2022-06-10 ENCOUNTER — HOSPITAL ENCOUNTER (OUTPATIENT)
Dept: MRI IMAGING | Facility: HOSPITAL | Age: 52
Discharge: HOME | End: 2022-06-10
Payer: MEDICARE

## 2022-06-10 DIAGNOSIS — M54.50 LOWER BACK PAIN: ICD-10-CM

## 2022-06-10 PROCEDURE — 72148 MRI LUMBAR SPINE W/O DYE: CPT

## 2022-06-21 ENCOUNTER — OFFICE VISIT (OUTPATIENT)
Dept: NEUROSURGERY | Facility: CLINIC | Age: 52
End: 2022-06-21
Payer: MEDICARE

## 2022-06-21 VITALS
SYSTOLIC BLOOD PRESSURE: 112 MMHG | HEIGHT: 69 IN | DIASTOLIC BLOOD PRESSURE: 74 MMHG | HEART RATE: 50 BPM | TEMPERATURE: 97.4 F | WEIGHT: 210 LBS | RESPIRATION RATE: 16 BRPM | BODY MASS INDEX: 31.1 KG/M2

## 2022-06-21 DIAGNOSIS — M51.26 HERNIATED INTERVERTEBRAL DISC OF LUMBAR SPINE: Primary | ICD-10-CM

## 2022-06-21 DIAGNOSIS — M54.16 LUMBAR RADICULOPATHY: ICD-10-CM

## 2022-06-21 PROCEDURE — 99213 OFFICE O/P EST LOW 20 MIN: CPT | Performed by: NEUROLOGICAL SURGERY
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
## 2022-08-22 ENCOUNTER — CONSULT (OUTPATIENT)
Dept: PAIN MEDICINE | Facility: CLINIC | Age: 52
End: 2022-08-22
Payer: MEDICARE

## 2022-08-22 ENCOUNTER — HOSPITAL ENCOUNTER (OUTPATIENT)
Dept: RADIOLOGY | Facility: HOSPITAL | Age: 52
Discharge: HOME | End: 2022-08-22
Attending: ANESTHESIOLOGY
Payer: MEDICARE

## 2022-08-22 ENCOUNTER — TRANSCRIBE ORDERS (OUTPATIENT)
Dept: PAIN MEDICINE | Facility: CLINIC | Age: 52
End: 2022-08-22

## 2022-08-22 VITALS
SYSTOLIC BLOOD PRESSURE: 115 MMHG | HEART RATE: 57 BPM | DIASTOLIC BLOOD PRESSURE: 78 MMHG | WEIGHT: 215 LBS | BODY MASS INDEX: 31.75 KG/M2

## 2022-08-22 DIAGNOSIS — M51.26 HERNIATED INTERVERTEBRAL DISC OF LUMBAR SPINE: ICD-10-CM

## 2022-08-22 DIAGNOSIS — M46.1 SACROILIITIS (HCC): ICD-10-CM

## 2022-08-22 DIAGNOSIS — M25.551 RIGHT HIP PAIN: ICD-10-CM

## 2022-08-22 DIAGNOSIS — M46.1 SACROILIITIS (HCC): Primary | ICD-10-CM

## 2022-08-22 DIAGNOSIS — M54.16 LUMBAR RADICULOPATHY: ICD-10-CM

## 2022-08-22 PROCEDURE — 99204 OFFICE O/P NEW MOD 45 MIN: CPT | Performed by: ANESTHESIOLOGY

## 2022-08-22 PROCEDURE — 73522 X-RAY EXAM HIPS BI 3-4 VIEWS: CPT

## 2022-08-22 NOTE — PROGRESS NOTES
Assessment  1  Sacroiliitis ([facility] [address] )    2  Herniated intervertebral disc of lumbar spine    3  Lumbar radiculopathy    4  Right hip pain        Plan  The patient's symptoms, history/physical are consistent with pain that is multifactorial in origin  He has pain in the lower lumbosacral area particularly with sitting and movement  He also has pain with right hip TISH testing  At this time, I will order x-rays of the bilateral hip/pelvis  I advised him we will call with the results and discuss treatment moving forward  My impressions and treatment recommendations were discussed in detail with the patient who verbalized understanding and had no further questions  Discharge instructions were provided  I personally saw and examined the patient and I agree with the above discussed plan of care  Orders Placed This Encounter   Procedures    XR hips bilateral 3-4 vw w pelvis if performed     Standing Status:   Future     Standing Expiration Date:   8/22/2026     Scheduling Instructions:      Bring along any outside films relating to this procedure  No orders of the defined types were placed in this encounter  History of Present Illness    Tammy Knight is a 47 y o  male referred by Dr Sloane Lackey for lower back pain that has been present for 1 month  Symptoms are severe rated 10/10 on numeric rating scale felt nearly constantly worse the morning described to be burning  Pain symptoms are aggravated physical activity including bending and sitting  Treatment history has included use of Tylenol and baclofen which are providing some moderate relief  Physical therapy provided no relief  Walking provides moderate relief  I have personally reviewed and/or updated the patient's past medical history, past surgical history, family history, social history, current medications, allergies, and vital signs today  Review of Systems   Constitutional: Negative for fever and unexpected weight change     HENT: Negative for trouble swallowing  Eyes: Negative for visual disturbance  Respiratory: Negative for shortness of breath and wheezing  Cardiovascular: Negative for chest pain and palpitations  Gastrointestinal: Negative for constipation, diarrhea, nausea and vomiting  Endocrine: Negative for cold intolerance, heat intolerance and polydipsia  Genitourinary: Negative for difficulty urinating and frequency  Musculoskeletal: Positive for back pain, gait problem and joint swelling  Negative for arthralgias and myalgias  Skin: Negative for rash  Neurological: Negative for dizziness, seizures, syncope, weakness and headaches  Hematological: Does not bruise/bleed easily  Psychiatric/Behavioral: Negative for dysphoric mood  All other systems reviewed and are negative  Patient Active Problem List   Diagnosis    Cardiomyopathy ([place] [address] )    Hyperlipidemia    Premature ventricular contraction    Lower back pain    Herniated intervertebral disc of lumbar spine       Past Medical History:   Diagnosis Date    CHF (congestive heart failure) ([place] [address] )        History reviewed  No pertinent surgical history  History reviewed  No pertinent family history      Social History     Occupational History    Not on file   Tobacco Use    Smoking status: Never Smoker    Smokeless tobacco: Never Used   Substance and Sexual Activity    Alcohol use: Never    Drug use: Never    Sexual activity: Not on file       Current Outpatient Medications on File Prior to Visit   Medication Sig    acetaminophen (TYLENOL) 500 mg tablet Take 1 tablet by mouth every 6 (six) hours as needed      baclofen 10 mg tablet TAKE 1 TABLET BY MOUTH 3 TIMES A DAY FOR SPASMS    carvedilol (COREG) 25 mg tablet Take by mouth Twice daily    cholecalciferol (VITAMIN D3) 1,000 units tablet Take 1,000 Units by mouth daily    DAILY MULTIPLE VITAMINS PO Take 1 tablet by mouth daily    levothyroxine 125 mcg tablet Take 1 tablet by mouth daily    losartan (COZAAR) 25 mg tablet Take 1 tablet by mouth daily    Omega-3 Fatty Acids (FISH OIL PO) Take 2 g by mouth 2 (two) times a day      Zinc 50 MG TABS Take 1 tablet by mouth as needed    [DISCONTINUED] aspirin 81 MG tablet Take 1 tablet by mouth daily    [DISCONTINUED] methocarbamol (Robaxin-750) 750 mg tablet Take 1 tablet (750 mg total) by mouth every 6 (six) hours as needed for muscle spasms     No current facility-administered medications on file prior to visit  Allergies   Allergen Reactions    Other      darvocet   Oxycodone       Physical Exam    /78   Pulse 57   Wt 97 5 kg (215 lb)   BMI 31 75 kg/m²     Constitutional: normal, well developed, well nourished, alert, in no distress and non-toxic and no overt pain behavior    Eyes: anicteric  HEENT: grossly intact  Neck: supple, symmetric, trachea midline and no masses   Pulmonary:even and unlabored  Cardiovascular:No edema or pitting edema present  Skin:Normal without rashes or lesions and well hydrated  Psychiatric:Mood and affect appropriate  Neurologic:Cranial Nerves II-XII grossly intact  Musculoskeletal:normal     Lumbar Spine Exam  Appearance:  Normal lordosis  Palpation/Tenderness:  no tenderness or spasm  Range of Motion:  Flexion:  Minimally limited  with pain  Extension:  Minimally limited  with pain  Motor Strength:  Left hip flexion:  5/5  Left hip extension:  5/5  Right hip flexion:  5/5  Right hip extension:  5/5  Left knee flexion:  5/5  Left knee extension:  5/5  Right knee flexion:  5/5  Right knee extension:  5/5  Left foot dorsiflexion:  5/5  Left foot plantar flexion:  5/5  Right foot dorsiflexion:  5/5  Right foot plantar flexion:  5/5  Reflexes:  Left Patellar:  2+   Right Patellar:  2+   Left Achilles:  2+   Right Achilles:  2+     Imaging    MRI LUMBAR SPINE WITHOUT CONTRAST (6/10/2022)     INDICATION: M54 50: Low back pain, unspecified      COMPARISON:  None      TECHNIQUE:  Sagittal T1, sagittal T2, sagittal inversion recovery, axial T1 and axial T2, coronal T2     IMAGE QUALITY:  Diagnostic     FINDINGS:     VERTEBRAL BODIES:  There are 5 lumbar type vertebral bodies  For the purposes of this dictation the last well-formed disc space will be labeled L4-L5  The L5 vertebral body is therefore partially sacralized and there is hypoplastic disc at L5-S1  Trace retrolisthesis of L2 on L3      SACRUM:  Normal signal within the sacrum  No evidence of insufficiency or stress fracture      DISTAL CORD AND CONUS:  Normal size and signal within the distal cord and conus  Conus medullaris terminates at L1      PARASPINAL SOFT TISSUES:  Paraspinal soft tissues are unremarkable      LOWER THORACIC DISC SPACES:  Normal disc height and signal   No disc herniation, canal stenosis or foraminal narrowing      LUMBAR DISC SPACES:     L1-L2:  There is disc space degeneration and narrowing  Bulging annulus  There is no significant canal stenosis or foraminal narrowing      L2-L3:  There is disc space degeneration and there is a bulging annulus posterior annular fissure  There is a left foraminal/extraforaminal disc protrusion and marginal osteophytosis  There is contact of the extraforaminal portion of the left L2 nerve   root  There is marginal osteophytosis  There is mild left foraminal narrowing      L3-L4:  There is a mild bulge  There is facet arthrosis  There is no significant canal stenosis  There is mild bilateral foraminal narrowing      L4-L5:  There is disc space degeneration and narrowing  There is a bulging annulus and posterior annular fissure  There is marginal osteophytosis  There is mild foraminal narrowing      L5-S1: There is hypoplastic disc space  There is facet arthrosis    There is no significant canal stenosis or foraminal narrowing      IMPRESSION:     Degenerative changes of the lumbar spine, as described above        Most notable level is at L2-L3 where a bulging annulus and superimposed left foraminal/extraforaminal disc protrusion contacts the exiting left L2 nerve root

## 2022-08-22 NOTE — PATIENT INSTRUCTIONS
Core Strengthening Exercises   WHAT YOU NEED TO KNOW:   What do I need to know about core strengthening exercises? Core strengthening exercises help heal and strengthen muscles of your hips, back, and abdomen to prevent reinjury  They are beginning exercises to help support your spine  Ask your healthcare provider if you need to see a physical therapist for more advanced exercises  Do the exercises on a mat or firm surface  (not on a bed) to support your spine and avoid low back pain  Do the exercises in the same order every time  to train your muscles to work together  Your healthcare provider will show you how to perform these exercises  Do them every day, or as directed by your healthcare provider  Move slowly and smoothly  Avoid fast or jerky motions  Stop if you feel pain  It is normal to feel some discomfort at first  Regular exercise will help decrease your discomfort over time  How do I perform core strengthening exercises safely? Hold each exercise for 5 seconds  When you can do the exercise without pain for 5 seconds, increase your hold to 10 to 15 seconds  When you can do the exercise without pain for 10 to 15 seconds, add the next exercise  Increase the time you hold each exercise, or repeat the exercises as directed  As you do each exercise, breathe normally  Do not hold your breath  Abdominal bracing:  Lie on your back with your knees bent and feet flat on the floor  Place your arms in a relaxed position beside your body  Pull your belly button in toward your spine  Do not flatten or arch your back  Tighten the abdominal muscles below your belly button  Hold for 5 seconds  Begin all of your exercises with abdominal bracing  You can also practice abdominal bracing throughout the day while you are sitting or standing  Bridging:  Lie on your back with your knees bent and feet flat on the floor  Rest your arms at your side   Tighten your buttocks, and then lift your hips 1 inch off the floor  Hold for 5 seconds  When you can do this exercise without pain for 10 seconds, increase the distance you lift your hips  A good goal is to be able to lift your hips so that your shoulders, hips, and knees are in a straight line  Curl up:  Lie on your back with your knees bent and feet flat on the floor  Place your hands, palms down, underneath the curve in your lower back  Next, with your elbows on the floor, lift your shoulders and chest 2 to 3 inches  Keep your head in line with your shoulders  Hold this position for 5 seconds  When you can do this exercise without pain for 10 to 15 seconds, you may add a rotation  While your shoulders and chest are lifted off the ground, turn slightly to the left and hold  Repeat on the other side  Dead bug:  Lie on your back with your knees bent and feet flat on the floor  Place your arms in a relaxed position beside your body  Begin with abdominal bracing  Next, raise one leg, keeping your knee bent  Hold for 5 seconds  Repeat with the other leg  When you can do this exercise without pain for 10 to 15 seconds, you may raise one straight leg and hold  Repeat with the other leg  Quadruped:  Place your hands and knees on the floor  Keep your wrists directly below your shoulders and your knees directly below your hips  Pull your belly button in toward your spine  Do not flatten or arch your back  Tighten your abdominal muscles below your belly button  Hold for 5 seconds  When you can do this exercise without pain for 10 to 15 seconds, you may extend one arm and hold  Repeat on the other side  Side Bridge:      Standing side bridge:  Stand next to a wall and extend one arm toward the wall  Place your palm flat on the wall with your fingers pointing upward  Begin with abdominal bracing  Next, without moving your feet, slowly bend your arm to 90 degrees  Hold for 5 seconds  Repeat on the other side   When you can do this exercise without pain for 10 to 15 seconds, you may do the bent leg side bridge on the floor  Bent leg side bridge:  Lie on one side with your legs, hips, and shoulders in a straight line  Prop yourself up onto your forearm so your elbow is directly below your shoulder  Bend your knees back to 90 degrees  Begin with abdominal bracing  Next, lift your hips and balance yourself on your forearm and knees  Hold for 5 seconds  Repeat on the other side  When you can do this exercise without pain for 10 to 15 seconds, you may do the straight leg side bridge on the floor  Straight leg side bridge:  Lie on one side with your legs, hips, and shoulders in a straight line  Prop yourself up onto your forearm so your elbow is directly below your shoulder  Begin with abdominal bracing  Lift your hips off the floor and balance yourself on your forearm and the outside of your flexed foot  Do not let your ankle bend sideways  Hold for 5 seconds  Repeat on the other side  When you can do this exercise without pain for 10 to 15 seconds, ask your healthcare provider for more advanced exercises  When should I contact my healthcare provider? Your pain becomes worse  You have new pain  You have questions or concerns about your condition, care, or exercise program   CARE AGREEMENT:   You have the right to help plan your care  Learn about your health condition and how it may be treated  Discuss treatment options with your caregivers to decide what care you want to receive  You always have the right to refuse treatment  The above information is an  only  It is not intended as medical advice for individual conditions or treatments  Talk to your doctor, nurse or pharmacist before following any medical regimen to see if it is safe and effective for you  © 2016 [employer] is for End User's use only and may not be sold, redistributed or otherwise used for commercial purposes  All illustrations and images included in CareNotes® are the copyrighted property of A [employer]  or Raul Boggs

## 2022-08-25 ENCOUNTER — TELEPHONE (OUTPATIENT)
Dept: PAIN MEDICINE | Facility: CLINIC | Age: 52
End: 2022-08-25

## 2022-08-25 DIAGNOSIS — M46.1 SACROILIITIS (HCC): Primary | ICD-10-CM

## 2022-08-25 NOTE — TELEPHONE ENCOUNTER
S/w pt,pt requesting xray reading can be expedited b/c he is in a lot of pain  Spoken to [facility] from radiology, said she'll ask the radiologist to read it today
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
## 2022-09-09 ENCOUNTER — HOSPITAL ENCOUNTER (OUTPATIENT)
Dept: RADIOLOGY | Facility: CLINIC | Age: 52
End: 2022-09-09
Payer: MEDICARE

## 2022-09-09 VITALS
HEART RATE: 67 BPM | RESPIRATION RATE: 20 BRPM | TEMPERATURE: 97.7 F | SYSTOLIC BLOOD PRESSURE: 104 MMHG | OXYGEN SATURATION: 95 % | DIASTOLIC BLOOD PRESSURE: 66 MMHG

## 2022-09-09 DIAGNOSIS — M46.1 SACROILIITIS (HCC): ICD-10-CM

## 2022-09-09 PROCEDURE — 27096 INJECT SACROILIAC JOINT: CPT | Performed by: ANESTHESIOLOGY

## 2022-09-09 RX ORDER — BUPIVACAINE HCL/PF 2.5 MG/ML
7 VIAL (ML) INJECTION ONCE
Status: COMPLETED | OUTPATIENT
Start: 2022-09-09 | End: 2022-09-09

## 2022-09-09 RX ORDER — 0.9 % SODIUM CHLORIDE 0.9 %
4 VIAL (ML) INJECTION ONCE
Status: COMPLETED | OUTPATIENT
Start: 2022-09-09 | End: 2022-09-09

## 2022-09-09 RX ORDER — METHYLPREDNISOLONE ACETATE 80 MG/ML
80 INJECTION, SUSPENSION INTRA-ARTICULAR; INTRALESIONAL; INTRAMUSCULAR; PARENTERAL; SOFT TISSUE ONCE
Status: COMPLETED | OUTPATIENT
Start: 2022-09-09 | End: 2022-09-09

## 2022-09-09 RX ADMIN — Medication 4 ML: at 14:18

## 2022-09-09 RX ADMIN — BUPIVACAINE HYDROCHLORIDE 7 ML: 2.5 INJECTION, SOLUTION EPIDURAL; INFILTRATION; INTRACAUDAL at 14:20

## 2022-09-09 RX ADMIN — IOHEXOL 2 ML: 300 INJECTION, SOLUTION INTRAVENOUS at 14:20

## 2022-09-09 RX ADMIN — METHYLPREDNISOLONE ACETATE 80 MG: 80 INJECTION, SUSPENSION INTRA-ARTICULAR; INTRALESIONAL; INTRAMUSCULAR; PARENTERAL; SOFT TISSUE at 14:20

## 2022-09-09 NOTE — H&P
History of Present Illness: The patient is a 47 y o  male who presents with complaints of lower back pain is here today for bilateral sacroiliac joint injections    Patient Active Problem List   Diagnosis    [facility] ([facility] [address] )    Hyperlipidemia    Premature ventricular contraction    Lower back pain    Herniated intervertebral disc of lumbar spine       Past Medical History:   Diagnosis Date    [facility] )        No past surgical history on file        Current Outpatient Medications:     acetaminophen (TYLENOL) 500 mg tablet, Take 1 tablet by mouth every 6 (six) hours as needed  , Disp: , Rfl:     baclofen 10 mg tablet, TAKE 1 TABLET BY MOUTH 3 TIMES A DAY FOR SPASMS, Disp: , Rfl: 3    carvedilol (COREG) 25 mg tablet, Take by mouth Twice daily, Disp: , Rfl:     cholecalciferol (VITAMIN D3) 1,000 units tablet, Take 1,000 Units by mouth daily, Disp: , Rfl:     DAILY MULTIPLE VITAMINS PO, Take 1 tablet by mouth daily, Disp: , Rfl:     levothyroxine 125 mcg tablet, Take 1 tablet by mouth daily, Disp: , Rfl:     losartan (COZAAR) 25 mg tablet, Take 1 tablet by mouth daily, Disp: , Rfl:     Omega-3 Fatty Acids (FISH OIL PO), Take 2 g by mouth 2 (two) times a day  , Disp: , Rfl:     Zinc 50 MG TABS, Take 1 tablet by mouth as needed, Disp: , Rfl:     Current Facility-Administered Medications:     bupivacaine (PF) (MARCAINE) 0 25 % injection 7 mL, 7 mL, Intra-articular, Once, Belen Paul MD    iohexol (OMNIPAQUE) 300 mg/mL injection 2 mL, 2 mL, Intra-articular, Once, Belen Paul MD    lidocaine (PF) (XYLOCAINE-MPF) 2 % injection 4 mL, 4 mL, Infiltration, Once, Belen Paul MD    methylPREDNISolone acetate (DEPO-MEDROL) injection 80 mg, 80 mg, Intra-articular, Once, Belen Paul MD    sodium chloride (PF) 0 9 % injection 4 mL, 4 mL, Infiltration, Once, Belen Paul MD    Allergies   Allergen Reactions    Other      darvocet   Oxycodone       Physical Exam: Vitals:    09/09/22 1407   BP: 112/71   Pulse: 67   Resp: 20   Temp: 97 7 °F (36 5 °C)   SpO2: 97%     General: Awake, Alert, Oriented x 3, Mood and affect appropriate  Respiratory: Respirations even and unlabored  Cardiovascular: Peripheral pulses intact; no edema  Musculoskeletal Exam:  Lower back pain    ASA Score: 3    Patient/Chart Verification  Patient ID Verified: Verbal  Consents Confirmed: To be obtained in the Pre-Procedure area  Allergies Reviewed: Yes  Anticoag/NSAID held?: NA  Currently on antibiotics?: No    Assessment:   1   Sacroiliitis ([facility] [address] )        Plan: Bilateral SIJ Injections

## 2022-09-09 NOTE — DISCHARGE INSTR - LAB
Steroid Joint Injection   WHAT YOU NEED TO KNOW:   A steroid joint injection is a procedure to inject steroid medicine into a joint  Steroid medicine decreases pain and inflammation  The injection may also contain an anesthetic (numbing medicine) to decrease pain  It may be done to treat conditions such as arthritis, gout, or carpal tunnel syndrome  The injections may be given in your knee, ankle, shoulder, elbow, wrist, ankle or sacroiliac joint  Do not apply heat to any area that is numb  If you have discomfort or soreness at the injection site, you may apply ice today, 20 minutes on and 20 minutes off  Tomorrow you may use ice or warm, moist heat  Do not apply ice or heat directly to the skin  You may have an increase or change in the discomfort for 36-48 hours after your treatment  Apply ice and continue with any pain medicine you have been prescribed  Do not do anything strenuous today  You may shower, but no tub baths or hot tubs today  You may resume your normal activities tomorrow, but do not overdo it  Resume normal activities slowly when you are feeling better  If you experience redness, drainage or swelling at the injection site, or if you develop a fever above 100 degrees, please call [facility] at [phone] or go to the Emergency Room  Continue to take all routine medicines prescribed by your primary care physician unless otherwise instructed by our staff  Most blood thinners should be started again according to your regularly scheduled dosing  If you have any questions, please give our office a call  As no general anesthesia was used in today's procedure, you should not experience any side effects related to anesthesia  If you are diabetic, the steroids used in today's injection may temporarily increase your blood sugar levels after the first few days after your injection   Please keep a close eye on your sugars and alert the doctor who manages your diabetes if your sugars are significantly high from your baseline or you are symptomatic  If you have a problem specifically related to your procedure, please call our office at [phone]  Problems not related to your procedure should be directed to your primary care physician

## 2022-09-16 ENCOUNTER — TELEPHONE (OUTPATIENT)
Dept: PAIN MEDICINE | Facility: CLINIC | Age: 52
End: 2022-09-16

## 2022-09-30 NOTE — TELEPHONE ENCOUNTER
70% improvement and pain level 3/10     How long between injection so he can schedule next one       Interested in getting injection in lower back

## 2022-10-04 NOTE — TELEPHONE ENCOUNTER
S/w pt, advised of the same  Pt verbalized understanding  Pt states he has been participating in PT and felt he was getting stronger; however, he continues to have difficulty transitioning from sitting to standing position  Pt states he had previously had ultrasound therapy treatments at [facility] with notable relief for several weeks, but upon repeating ultrasound treatment at therapy yesterday, he did not experience the same relief as previous treatments  Pt states pain is primarily in lower back, tailbone, worse when seated for extended period of time  Pt states when he is in the car he feels his tailbone "shift" and then he experiences increase in pain  Pt unsure if surgery would be the next step, of if lower back inj may be an option  Please advise regarding next step, thank you

## 2022-10-04 NOTE — TELEPHONE ENCOUNTER
SIJ injections last 4-6 months  Can repeat in 4 months if necessary but other treatments are available if symptoms change

## 2022-10-05 ENCOUNTER — TELEPHONE (OUTPATIENT)
Dept: PAIN MEDICINE | Facility: CLINIC | Age: 52
End: 2022-10-05

## 2022-10-05 DIAGNOSIS — M51.16 INTERVERTEBRAL DISC DISORDER WITH RADICULOPATHY OF LUMBAR REGION: Primary | ICD-10-CM

## 2022-10-05 NOTE — TELEPHONE ENCOUNTER
Pt informed of recommendation form [facility] for a LESI, pt willing to proceed  Told pt to expect a call from our  Ruba Dia once procedure has been ordered

## 2022-10-07 NOTE — TELEPHONE ENCOUNTER
S/w pt, states 2 weeks ago he had an SI injection, then he develops dry skin to the point where it itch all over  He legs and fingers are cramping too  He is concerned these symptoms might be a reaction he had from the injection  Pls advise b/c he agreed to have TEE

## 2022-10-07 NOTE — TELEPHONE ENCOUNTER
Caller: Natalia Pittman     Doctor: Yeny Sofia     Reason for call: to talk about side effects from last injection     Call back#: [phone]

## 2022-10-10 ENCOUNTER — TELEPHONE (OUTPATIENT)
Dept: PAIN MEDICINE | Facility: CLINIC | Age: 52
End: 2022-10-10

## 2022-10-10 NOTE — TELEPHONE ENCOUNTER
Pt called Friday 10/07/22   Had inj 9/09 with Dr Eula Shaikh  Having cramps in legs & fingers   Skin dry and itchy  Spoke to nurse who said she would speak to pt    Pts # [phone]  Or # [phone]

## 2022-10-11 ENCOUNTER — HOSPITAL ENCOUNTER (OUTPATIENT)
Dept: RADIOLOGY | Facility: CLINIC | Age: 52
Discharge: HOME | End: 2022-10-11
Payer: MEDICARE

## 2022-10-11 VITALS
SYSTOLIC BLOOD PRESSURE: 126 MMHG | RESPIRATION RATE: 20 BRPM | TEMPERATURE: 96.1 F | DIASTOLIC BLOOD PRESSURE: 78 MMHG | OXYGEN SATURATION: 96 % | HEART RATE: 48 BPM

## 2022-10-11 DIAGNOSIS — M51.16 INTERVERTEBRAL DISC DISORDER WITH RADICULOPATHY OF LUMBAR REGION: ICD-10-CM

## 2022-10-11 PROCEDURE — 62323 NJX INTERLAMINAR LMBR/SAC: CPT | Performed by: ANESTHESIOLOGY

## 2022-10-11 RX ORDER — METHYLPREDNISOLONE ACETATE 80 MG/ML
80 INJECTION, SUSPENSION INTRA-ARTICULAR; INTRALESIONAL; INTRAMUSCULAR; PARENTERAL; SOFT TISSUE ONCE
Status: COMPLETED | OUTPATIENT
Start: 2022-10-11 | End: 2022-10-11

## 2022-10-11 RX ORDER — BUPIVACAINE HCL/PF 2.5 MG/ML
2 VIAL (ML) INJECTION ONCE
Status: COMPLETED | OUTPATIENT
Start: 2022-10-11 | End: 2022-10-11

## 2022-10-11 RX ADMIN — METHYLPREDNISOLONE ACETATE 80 MG: 80 INJECTION, SUSPENSION INTRA-ARTICULAR; INTRALESIONAL; INTRAMUSCULAR; PARENTERAL; SOFT TISSUE at 13:13

## 2022-10-11 RX ADMIN — IOHEXOL 2 ML: 300 INJECTION, SOLUTION INTRAVENOUS at 13:13

## 2022-10-11 RX ADMIN — BUPIVACAINE HYDROCHLORIDE 2 ML: 2.5 INJECTION, SOLUTION EPIDURAL; INFILTRATION; INTRACAUDAL at 13:13

## 2022-10-11 NOTE — H&P
History of Present Illness: The patient is a 47 y o  male who presents with complaints of lower back pain is here today for lumbar epidural steroid injection    Past Medical History:   Diagnosis Date   • CHF (congestive heart failure) ([facility] [address] )        No past surgical history on file  Current Outpatient Medications:   •  acetaminophen (TYLENOL) 500 mg tablet, Take 1 tablet by mouth every 6 (six) hours as needed  , Disp: , Rfl:   •  baclofen 10 mg tablet, TAKE 1 TABLET BY MOUTH 3 TIMES A DAY FOR SPASMS, Disp: , Rfl: 3  •  carvedilol (COREG) 25 mg tablet, Take by mouth Twice daily, Disp: , Rfl:   •  cholecalciferol (VITAMIN D3) 1,000 units tablet, Take 1,000 Units by mouth daily, Disp: , Rfl:   •  DAILY MULTIPLE VITAMINS PO, Take 1 tablet by mouth daily, Disp: , Rfl:   •  levothyroxine 125 mcg tablet, Take 1 tablet by mouth daily, Disp: , Rfl:   •  losartan (COZAAR) 25 mg tablet, Take 1 tablet by mouth daily, Disp: , Rfl:   •  Omega-3 Fatty Acids (FISH OIL PO), Take 2 g by mouth 2 (two) times a day  , Disp: , Rfl:   •  Zinc 50 MG TABS, Take 1 tablet by mouth as needed, Disp: , Rfl:     Current Facility-Administered Medications:   •  bupivacaine (PF) (MARCAINE) 0 25 % injection 2 mL, 2 mL, Epidural, Once, Lena Ferrara MD  •  iohexol (OMNIPAQUE) 300 mg/mL injection 1 mL, 1 mL, Epidural, Once, Lena Ferrara MD  •  methylPREDNISolone acetate (DEPO-MEDROL) injection 80 mg, 80 mg, Epidural, Once, Lena Ferrara MD    Allergies   Allergen Reactions   • Other      darvocet   Oxycodone       Physical Exam:   Vitals:    10/11/22 1256   Pulse: (!) 51   Resp: 20   Temp: (!) 96 1 °F (35 6 °C)   SpO2: 97%     General: Awake, Alert, Oriented x 3, Mood and affect appropriate  Respiratory: Respirations even and unlabored  Cardiovascular: Peripheral pulses intact; no edema  Musculoskeletal Exam:  Lower back pain    ASA Score: 2    Patient/Chart Verification  Patient ID Verified: Verbal  Consents Confirmed:  To be obtained in the Pre-Procedure area  Allergies Reviewed: Yes  Anticoag/NSAID held?: NA  Currently on antibiotics?: No    Assessment:   1   Intervertebral disc disorder with radiculopathy of lumbar region        Plan: TEE

## 2022-10-11 NOTE — DISCHARGE INSTR - LAB
Epidural Steroid Injection   WHAT YOU NEED TO KNOW:   An epidural steroid injection (MARGARETH) is a procedure to inject steroid medicine into the epidural space  The epidural space is between your spinal cord and vertebrae  Steroids reduce inflammation and fluid buildup in your spine that may be causing pain  You may be given pain medicine along with the steroids  ACTIVITY  Do not drive or operate machinery today  No strenuous activity today - bending, lifting, etc   You may resume normal activites starting tomorrow - start slowly and as tolerated  You may shower today, but no tub baths or hot tubs  You may have numbness for several hours from the local anesthetic  Please use caution and common sense, especially with weight-bearing activities  CARE OF THE INJECTION SITE  If you have soreness or pain, apply ice to the area today (20 minutes on/20 minutes off)  Starting tomorrow, you may use warm, moist heat or ice if needed  You may have an increase or change in your discomfort for 36-48 hours after your treatment  Apply ice and continue with any pain medication you have been prescribed  Notify [facility] if you have any of the following: redness, drainage, swelling, headache, stiff neck or fever above 100°F     SPECIAL INSTRUCTIONS  Our office will contact you in approximately 7 days for a progress report  MEDICATIONS  Continue to take all routine medications  Our office may have instructed you to hold some medications  As no general anesthesia was used in today's procedure, you should not experience any side effects related to anesthesia  If you are diabetic, the steroids used in today's injection may temporarily increase your blood sugar levels after the first few days after your injection  Please keep a close eye on your sugars and alert the doctor who manages your diabetes if your sugars are significantly high from your baseline or you are symptomatic       If you have a problem specifically related to your procedure, please call our office at [phone]  Problems not related to your procedure should be directed to your primary care physician

## 2022-10-18 ENCOUNTER — TELEPHONE (OUTPATIENT)
Dept: PAIN MEDICINE | Facility: CLINIC | Age: 52
End: 2022-10-18

## 2022-10-21 NOTE — TELEPHONE ENCOUNTER
Caller: Gallo Gallegos    Doctor:Sara    Reason for call:patient called stating that hes having leg cramping since procedure and wants to know what he can do to     Call back#: [phone]- ok to leave a detailed message

## 2022-10-24 NOTE — TELEPHONE ENCOUNTER
Pt reports for the past 2 weeks he's been eating 3 bananas a day to help with nighttime leg cramps.  He has nighttime legs cramps in both legs and sometimes occasional daytime cramps in fingers.    He currently is on baclofen 10 mg twice a day that he takes for cramps he gets from spinal cord injury in his neck. Baclofen helps the neck cramps but not the leg cramps.  He at times has to get in a bath tube to relax the leg cramps b/c they are so bad.  He would prefer a dietary recommendation if possible.     He reports 50-60% improvement with his lumbar region since inj and his SIJ are good.  Told pt will forward to  for recommendations.   Uses [employer] on file.

## 2022-10-24 NOTE — TELEPHONE ENCOUNTER
Caller: Patient  Doctor: Sara    Reason for call: pt getting bad leg cramps and is up every hour during the night. Pt got a injection in his SI and lumbar joints. Pt needs someone to call him back today with what he can do about this.pt is eating 3 bananas with peanut butter every day and it is not working.     Call back#: [phone] call this #

## 2023-02-09 ENCOUNTER — TELEPHONE (OUTPATIENT)
Dept: CARDIOLOGY CLINIC | Facility: CLINIC | Age: 53
End: 2023-02-09

## 2023-02-09 DIAGNOSIS — I42.0 DILATED CARDIOMYOPATHY (HCC): Primary | ICD-10-CM

## 2023-02-28 ENCOUNTER — HOSPITAL ENCOUNTER (OUTPATIENT)
Dept: NON INVASIVE DIAGNOSTICS | Facility: CLINIC | Age: 53
Discharge: HOME | End: 2023-02-28

## 2023-02-28 VITALS
BODY MASS INDEX: 31.84 KG/M2 | HEIGHT: 69 IN | DIASTOLIC BLOOD PRESSURE: 78 MMHG | SYSTOLIC BLOOD PRESSURE: 126 MMHG | WEIGHT: 215 LBS | HEART RATE: 55 BPM

## 2023-02-28 DIAGNOSIS — I42.0 DILATED CARDIOMYOPATHY (HCC): ICD-10-CM

## 2023-02-28 LAB
AORTIC ROOT: 3.3 CM
APICAL FOUR CHAMBER EJECTION FRACTION: 40 %
ASCENDING AORTA: 3.1 CM
E WAVE DECELERATION TIME: 222 MS
FRACTIONAL SHORTENING: 22 (ref 28–44)
GLOBAL LONGITUIDAL STRAIN: 12 %
INTERVENTRICULAR SEPTUM IN DIASTOLE (PARASTERNAL SHORT AXIS VIEW): 1.2 CM
INTERVENTRICULAR SEPTUM: 1.2 CM (ref 0.6–1.1)
LAAS-AP2: 20.4 CM2
LAAS-AP4: 15.8 CM2
LEFT ATRIUM SIZE: 3.7 CM
LEFT INTERNAL DIMENSION IN SYSTOLE: 5.1 CM (ref 2.1–4)
LEFT VENTRICLE DIASTOLIC VOLUME (MOD BIPLANE): 179 ML
LEFT VENTRICLE SYSTOLIC VOLUME (MOD BIPLANE): 114 ML
LEFT VENTRICULAR INTERNAL DIMENSION IN DIASTOLE: 6.5 CM (ref 3.5–6)
LEFT VENTRICULAR POSTERIOR WALL IN END DIASTOLE: 1 CM
LEFT VENTRICULAR STROKE VOLUME: 90 ML
LV EF: 37 %
LVSV (TEICH): 90 ML
MV E'TISSUE VEL-SEP: 5 CM/S
MV PEAK A VEL: 0.53 M/S
MV PEAK E VEL: 58 CM/S
MV STENOSIS PRESSURE HALF TIME: 64 MS
MV VALVE AREA P 1/2 METHOD: 3.44
RA PRESSURE ESTIMATED: 5 MMHG
RIGHT ATRIAL 2D VOLUME: 50 ML
RIGHT ATRIUM AREA SYSTOLE A4C: 18.1 CM2
RIGHT VENTRICLE ID DIMENSION: 4.2 CM
SL CV LEFT ATRIUM LENGTH A2C: 4.6 CM
SL CV LV EF: 35
SL CV PED ECHO LEFT VENTRICLE DIASTOLIC VOLUME (MOD BIPLANE) 2D: 215 ML
SL CV PED ECHO LEFT VENTRICLE SYSTOLIC VOLUME (MOD BIPLANE) 2D: 125 ML
TRICUSPID ANNULAR PLANE SYSTOLIC EXCURSION: 1.7 CM

## 2023-05-16 ENCOUNTER — OFFICE VISIT (OUTPATIENT)
Dept: CARDIOLOGY CLINIC | Facility: CLINIC | Age: 53
End: 2023-05-16

## 2023-05-16 VITALS
WEIGHT: 215 LBS | SYSTOLIC BLOOD PRESSURE: 106 MMHG | HEIGHT: 69 IN | OXYGEN SATURATION: 95 % | BODY MASS INDEX: 31.84 KG/M2 | DIASTOLIC BLOOD PRESSURE: 70 MMHG | HEART RATE: 52 BPM

## 2023-05-16 DIAGNOSIS — M46.1 SACROILIITIS (HCC): ICD-10-CM

## 2023-05-16 DIAGNOSIS — E78.2 MIXED HYPERLIPIDEMIA: ICD-10-CM

## 2023-05-16 DIAGNOSIS — M51.26 HERNIATED INTERVERTEBRAL DISC OF LUMBAR SPINE: ICD-10-CM

## 2023-05-16 DIAGNOSIS — M51.16 INTERVERTEBRAL DISC DISORDER WITH RADICULOPATHY OF LUMBAR REGION: ICD-10-CM

## 2023-05-16 DIAGNOSIS — I49.3 PREMATURE VENTRICULAR CONTRACTION: ICD-10-CM

## 2023-05-16 DIAGNOSIS — I42.0 DILATED CARDIOMYOPATHY (HCC): Primary | ICD-10-CM

## 2023-05-16 NOTE — PROGRESS NOTES
Cardiology Follow Up    Juan Levy  1970  [id number]  HEART & VASCULAR [facility] [address]    1  Dilated cardiomyopathy ([facility] [address] )  POCT ECG    MRI cardiac  w wo contrast    Holter monitor    CBC and differential    Comprehensive metabolic panel    Lipid Panel with Direct LDL reflex    TSH, 3rd generation with Free T4 reflex      2  Premature ventricular contraction        3  Intervertebral disc disorder with radiculopathy of lumbar region        4  Herniated intervertebral disc of lumbar spine        5  Sacroiliitis ([facility] [address] )        6  Mixed hyperlipidemia  CBC and differential    Comprehensive metabolic panel    Lipid Panel with Direct LDL reflex    TSH, 3rd generation with Free T4 reflex          Discussion/Summary: Ejection fraction down to 35% on recent echocardiogram has been fluctuating around 45% for the last several years  Is been 10 years since his last cardiac MRI I have ordered 1 to more clearly define LV function and make sure there are no new signs of any scar tissue  We will check Holter monitor to evaluate PVC burden  Continue Coreg and losartan  If ejection fraction is 35% or below will make referral back to electrophysiology for consideration of defibrillator  We will also add spironolactone if significantly reduced  Check basic lab work  Interval History:   Follow-up visit  He has a history of nonischemic cardiomyopathy ejection fraction 50%  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  Underwent ablation but was unable to get all the PVCs secondary to a site that was close to the aorta  Is been a few years since he has been here to the office  He returns to reestablish  Echocardiogram was ordered prior to visit  Functionally has been doing well he is able to get around the house and denies any exertional limitations    There has been no chest pain, shortness of breath, palpitations, lightheadedness, dizziness, or syncope  There is been a lower extremity edema, PND, orthopnea  Is been taking her medications as prescribed  Problem List     Cardiomyopathy [facility])    Overview Signed 4/19/2018  8:02 AM by Meera Mcallister DO     Description: Nonischemic cardiomyopathy, most recent ejection fraction 53%         Hyperlipidemia    Premature ventricular contraction        Past Medical History:   Diagnosis Date   • CHF (congestive heart failure) ([facility])      Social History     Socioeconomic History   • Marital status: /Civil Union     Spouse name: Not on file   • Number of children: Not on file   • Years of education: Not on file   • Highest education level: Not on file   Occupational History   • Not on file   Tobacco Use   • Smoking status: Never   • Smokeless tobacco: Never   Substance and Sexual Activity   • Alcohol use: Never   • Drug use: Never   • Sexual activity: Not on file   Other Topics Concern   • Not on file   Social History Narrative   • Not on file     Social Determinants of Health     Financial Resource Strain: Not on file   Food Insecurity: Not on file   Transportation Needs: Not on file   Physical Activity: Not on file   Stress: Not on file   Social Connections: Not on file   Intimate Partner Violence: Not on file   Housing Stability: Not on file      History reviewed  No pertinent family history  History reviewed  No pertinent surgical history      Current Outpatient Medications:   •  acetaminophen (TYLENOL) 500 mg tablet, Take 1 tablet by mouth every 6 (six) hours as needed  , Disp: , Rfl:   •  baclofen 10 mg tablet, TAKE 1 TABLET BY MOUTH 3 TIMES A DAY FOR SPASMS, Disp: , Rfl: 3  •  carvedilol (COREG) 25 mg tablet, Take by mouth Twice daily, Disp: , Rfl:   •  cholecalciferol (VITAMIN D3) 1,000 units tablet, Take 1,000 Units by mouth daily, Disp: , Rfl:   •  DAILY MULTIPLE VITAMINS PO, Take 1 tablet by mouth daily, Disp: , Rfl:   •  levothyroxine 125 mcg tablet, Take 1 tablet by mouth daily, Disp: , Rfl:   •  losartan (COZAAR) 25 mg tablet, Take 1 tablet by mouth daily, Disp: , Rfl:   •  Omega-3 Fatty Acids (FISH OIL PO), Take 2 g by mouth 2 (two) times a day  , Disp: , Rfl:   •  Zinc 50 MG TABS, Take 1 tablet by mouth as needed, Disp: , Rfl:   Allergies   Allergen Reactions   • Other      darvocet   Oxycodone       Labs:     Chemistry        Component Value Date/Time     03/22/2014 0427    K 4 7 04/29/2022 0823    K 4 0 03/22/2014 0427     04/29/2022 0823     03/22/2014 0427    CO2 30 04/29/2022 0823    CO2 26 03/22/2014 0427    BUN 16 04/29/2022 0823    BUN 15 03/22/2014 0427    CREATININE 1 04 04/29/2022 0823    CREATININE 0 89 03/22/2014 0427        Component Value Date/Time    CALCIUM 9 4 04/29/2022 0823    CALCIUM 9 3 03/22/2014 0427    ALKPHOS 37 (L) 04/29/2022 0823    ALKPHOS 45 (L) 03/21/2014 1634    AST 33 04/29/2022 0823    AST 21 03/21/2014 1634    ALT 31 04/29/2022 0823    ALT 31 03/21/2014 1634    BILITOT 0 65 03/21/2014 1634            Lab Results   Component Value Date    CHOL 172 08/08/2016     Lab Results   Component Value Date    HDL 39 (L) 04/29/2022    HDL 35 (L) 05/01/2019    HDL 37 (L) 08/08/2016     Lab Results   Component Value Date    LDLCALC 132 (H) 04/29/2022    LDLCALC 115 (H) 05/01/2019     Lab Results   Component Value Date    TRIG 86 04/29/2022    TRIG 331 (H) 05/01/2019    TRIG 106 08/08/2016     No results found for: CHOLHDL    Imaging: No results found  Review of Systems   Constitutional: Negative  HENT: Negative  Eyes: Negative  Cardiovascular: Negative  Respiratory: Negative  Endocrine: Negative  Hematologic/Lymphatic: Negative  Skin: Negative  Musculoskeletal: Negative  Gastrointestinal: Negative  Genitourinary: Negative  Neurological: Negative  Psychiatric/Behavioral: Negative  All other systems reviewed and are negative        Vitals:    05/16/23 1105   BP: 106/70   Pulse: (!) "52   SpO2: 95%     Vitals:    05/16/23 1105   Weight: 97 5 kg (215 lb)     Height: 5' 9\" (175 3 cm)   Body mass index is 31 75 kg/m²  Physical Exam:  Vital signs reviewed  General:  Alert and cooperative, appears stated age, no acute distress  HEENT:  PERRLA, EOMI, no scleral icterus, no conjunctival pallor  Neck:  No lymphadenopathy, no thyromegaly, no carotid bruits, no elevated JVP  Heart:  Regular rate and rhythm, normal S1/S2, no S3/S4, no murmur, rubs or gallops  PMI nondisplaced  Lungs:  Clear to auscultation bilaterally, no wheezes rales or rhonchi  Abdomen:  Soft, non-tender, positive bowel sounds, no rebound or guarding,   no organomegaly   Extremities:  Normal range of motion    No clubbing, cyanosis or edema   Vascular:  2+ pedal pulses  Skin:  No rashes or lesions on exposed skin  Neurologic:  Cranial nerves II-XII grossly intact without focal deficits  Psych:  Normal mood and affect        "

## 2023-06-06 ENCOUNTER — TELEPHONE (OUTPATIENT)
Dept: PAIN MEDICINE | Facility: CLINIC | Age: 53
End: 2023-06-06

## 2023-06-06 ENCOUNTER — PROCEDURE VISIT (OUTPATIENT)
Dept: CARDIOLOGY CLINIC | Facility: CLINIC | Age: 53
End: 2023-06-06
Payer: MEDICARE

## 2023-06-06 DIAGNOSIS — I42.9 CARDIOMYOPATHY, UNSPECIFIED TYPE (HCC): Primary | ICD-10-CM

## 2023-06-06 NOTE — TELEPHONE ENCOUNTER
Caller: Madeline     Doctor: Hong Menjivar     Reason for call: they called to confirm last appt and to find out of our physician will fill out disability paperwork I advised NO     I provided fax [phone] they want a copy of procedure notes

## 2023-06-07 NOTE — TELEPHONE ENCOUNTER
Left message that Dr Petersen Overlie not complete Disability paperwork and patient needs a new Release of Info  Completed

## 2023-06-08 ENCOUNTER — HOSPITAL ENCOUNTER (OUTPATIENT)
Dept: NON INVASIVE DIAGNOSTICS | Facility: HOSPITAL | Age: 53
Discharge: HOME | End: 2023-06-08
Payer: MEDICARE

## 2023-06-08 DIAGNOSIS — I42.0 DILATED CARDIOMYOPATHY (HCC): ICD-10-CM

## 2023-06-08 PROCEDURE — 93226 XTRNL ECG REC<48 HR SCAN A/R: CPT | Performed by: INTERNAL MEDICINE

## 2023-06-08 PROCEDURE — 93225 XTRNL ECG REC<48 HRS REC: CPT

## 2023-06-08 PROCEDURE — 93227 XTRNL ECG REC<48 HR R&I: CPT | Performed by: INTERNAL MEDICINE

## 2023-06-08 PROCEDURE — 93226 XTRNL ECG REC<48 HR SCAN A/R: CPT

## 2023-06-12 ENCOUNTER — TELEPHONE (OUTPATIENT)
Dept: NEUROSURGERY | Facility: CLINIC | Age: 53
End: 2023-06-12

## 2023-06-12 NOTE — TELEPHONE ENCOUNTER
6/12/23- RECEIVED CALL FROM GENA AT [facility]  CALLED [facility] AND SPOKE TO MAX  PER [place], RECORD REQUEST WAS SENT ON 5/5/23  ADVISED NO RECORD REQUEST RECEIVED   GAVE UPDATED FAX NUMBER FOR REQUEST

## 2023-06-14 ENCOUNTER — APPOINTMENT (OUTPATIENT)
Dept: LAB | Age: 53
End: 2023-06-14
Payer: MEDICARE

## 2023-06-14 DIAGNOSIS — Z00.00 ROUTINE GENERAL MEDICAL EXAMINATION AT A HEALTH CARE FACILITY: ICD-10-CM

## 2023-06-14 DIAGNOSIS — I42.0 DILATED CARDIOMYOPATHY (HCC): ICD-10-CM

## 2023-06-14 DIAGNOSIS — Z12.5 SPECIAL SCREENING FOR MALIGNANT NEOPLASM OF PROSTATE: ICD-10-CM

## 2023-06-14 DIAGNOSIS — E03.9 MYXEDEMA HEART DISEASE: ICD-10-CM

## 2023-06-14 DIAGNOSIS — E78.5 HYPERLIPIDEMIA, UNSPECIFIED HYPERLIPIDEMIA TYPE: ICD-10-CM

## 2023-06-14 DIAGNOSIS — I51.9 MYXEDEMA HEART DISEASE: ICD-10-CM

## 2023-06-14 DIAGNOSIS — E78.2 MIXED HYPERLIPIDEMIA: ICD-10-CM

## 2023-06-14 LAB
ALBUMIN SERPL BCP-MCNC: 3.9 G/DL (ref 3.5–5)
ALP SERPL-CCNC: 39 U/L (ref 46–116)
ALT SERPL W P-5'-P-CCNC: 33 U/L (ref 12–78)
ANION GAP SERPL CALCULATED.3IONS-SCNC: -1 MMOL/L (ref 4–13)
AST SERPL W P-5'-P-CCNC: 31 U/L (ref 5–45)
BASOPHILS # BLD AUTO: 0.04 THOUSANDS/ÂΜL (ref 0–0.1)
BASOPHILS NFR BLD AUTO: 1 % (ref 0–1)
BILIRUB SERPL-MCNC: 0.96 MG/DL (ref 0.2–1)
BUN SERPL-MCNC: 18 MG/DL (ref 5–25)
CALCIUM SERPL-MCNC: 9.5 MG/DL (ref 8.3–10.1)
CHLORIDE SERPL-SCNC: 110 MMOL/L (ref 96–108)
CHOLEST SERPL-MCNC: 195 MG/DL
CO2 SERPL-SCNC: 30 MMOL/L (ref 21–32)
CREAT SERPL-MCNC: 1.14 MG/DL (ref 0.6–1.3)
EOSINOPHIL # BLD AUTO: 0.15 THOUSAND/ÂΜL (ref 0–0.61)
EOSINOPHIL NFR BLD AUTO: 4 % (ref 0–6)
ERYTHROCYTE [DISTWIDTH] IN BLOOD BY AUTOMATED COUNT: 12.6 % (ref 11.6–15.1)
GFR SERPL CREATININE-BSD FRML MDRD: 72 ML/MIN/1.73SQ M
GLUCOSE P FAST SERPL-MCNC: 107 MG/DL (ref 65–99)
HCT VFR BLD AUTO: 46.2 % (ref 36.5–49.3)
HDLC SERPL-MCNC: 38 MG/DL
HGB BLD-MCNC: 15.3 G/DL (ref 12–17)
IMM GRANULOCYTES # BLD AUTO: 0 THOUSAND/UL (ref 0–0.2)
IMM GRANULOCYTES NFR BLD AUTO: 0 % (ref 0–2)
LDLC SERPL CALC-MCNC: 135 MG/DL (ref 0–100)
LYMPHOCYTES # BLD AUTO: 1.78 THOUSANDS/ÂΜL (ref 0.6–4.47)
LYMPHOCYTES NFR BLD AUTO: 43 % (ref 14–44)
MCH RBC QN AUTO: 31.2 PG (ref 26.8–34.3)
MCHC RBC AUTO-ENTMCNC: 33.1 G/DL (ref 31.4–37.4)
MCV RBC AUTO: 94 FL (ref 82–98)
MONOCYTES # BLD AUTO: 0.5 THOUSAND/ÂΜL (ref 0.17–1.22)
MONOCYTES NFR BLD AUTO: 12 % (ref 4–12)
NEUTROPHILS # BLD AUTO: 1.65 THOUSANDS/ÂΜL (ref 1.85–7.62)
NEUTS SEG NFR BLD AUTO: 40 % (ref 43–75)
NRBC BLD AUTO-RTO: 0 /100 WBCS
PLATELET # BLD AUTO: 129 THOUSANDS/UL (ref 149–390)
PMV BLD AUTO: 11.2 FL (ref 8.9–12.7)
POTASSIUM SERPL-SCNC: 5.4 MMOL/L (ref 3.5–5.3)
PROT SERPL-MCNC: 7.2 G/DL (ref 6.4–8.4)
PSA SERPL-MCNC: 0.64 NG/ML (ref 0–4)
RBC # BLD AUTO: 4.91 MILLION/UL (ref 3.88–5.62)
SODIUM SERPL-SCNC: 139 MMOL/L (ref 135–147)
TRIGL SERPL-MCNC: 110 MG/DL
TSH SERPL DL<=0.05 MIU/L-ACNC: 1.12 UIU/ML (ref 0.45–4.5)
WBC # BLD AUTO: 4.12 THOUSAND/UL (ref 4.31–10.16)

## 2023-06-14 PROCEDURE — 84153 ASSAY OF PSA TOTAL: CPT

## 2023-06-14 PROCEDURE — 80061 LIPID PANEL: CPT

## 2023-06-14 PROCEDURE — 36415 COLL VENOUS BLD VENIPUNCTURE: CPT

## 2023-06-20 ENCOUNTER — HOSPITAL ENCOUNTER (OUTPATIENT)
Dept: MRI IMAGING | Facility: HOSPITAL | Age: 53
Discharge: HOME | End: 2023-06-20
Attending: INTERNAL MEDICINE
Payer: MEDICARE

## 2023-06-20 DIAGNOSIS — I42.0 DILATED CARDIOMYOPATHY (HCC): ICD-10-CM

## 2023-06-20 PROCEDURE — G1004 CDSM NDSC: HCPCS

## 2023-06-20 PROCEDURE — A9585 GADOBUTROL INJECTION: HCPCS | Performed by: INTERNAL MEDICINE

## 2023-06-20 PROCEDURE — 75561 CARDIAC MRI FOR MORPH W/DYE: CPT

## 2023-06-20 RX ADMIN — GADOBUTROL 19 ML: 604.72 INJECTION INTRAVENOUS at 12:30

## 2023-07-05 ENCOUNTER — OFFICE VISIT (OUTPATIENT)
Dept: NEUROSURGERY | Facility: CLINIC | Age: 53
End: 2023-07-05
Payer: MEDICARE

## 2023-07-05 ENCOUNTER — HOSPITAL ENCOUNTER (OUTPATIENT)
Dept: RADIOLOGY | Facility: HOSPITAL | Age: 53
Discharge: HOME | End: 2023-07-05
Payer: MEDICARE

## 2023-07-05 VITALS
HEART RATE: 78 BPM | BODY MASS INDEX: 31.84 KG/M2 | HEIGHT: 69 IN | TEMPERATURE: 98 F | OXYGEN SATURATION: 96 % | WEIGHT: 215 LBS | SYSTOLIC BLOOD PRESSURE: 118 MMHG | DIASTOLIC BLOOD PRESSURE: 84 MMHG

## 2023-07-05 DIAGNOSIS — M54.12 RADICULOPATHY, CERVICAL: ICD-10-CM

## 2023-07-05 DIAGNOSIS — M54.16 LUMBAR RADICULOPATHY: Primary | ICD-10-CM

## 2023-07-05 DIAGNOSIS — L98.9 SCALP LESION: ICD-10-CM

## 2023-07-05 PROCEDURE — 72052 X-RAY EXAM NECK SPINE 6/>VWS: CPT

## 2023-07-05 PROCEDURE — 99204 OFFICE O/P NEW MOD 45 MIN: CPT | Performed by: PHYSICIAN ASSISTANT
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
## 2023-07-06 ENCOUNTER — TELEPHONE (OUTPATIENT)
Dept: NEUROSURGERY | Facility: CLINIC | Age: 53
End: 2023-07-06

## 2023-07-06 NOTE — TELEPHONE ENCOUNTER
I received a fax from [employer] regarding disability forms for Kushal Ramirez. Patient was made aware at his appointment on 7/5/23 that we would not be able to fill out these forms because we didn't put him on Disability. Patient stated that it was ok and and he had his PCP and other providers filling out the forms. I did reach out to [facility] and l/m on [facility]( ) v-mail  stating that we wouldn't be able to fill out forms. I left my direct number in case she calls back.     [facility] Phone # [phone] *[phone], Fax # [phone]

## 2023-07-10 ENCOUNTER — TELEPHONE (OUTPATIENT)
Dept: NEUROSURGERY | Facility: CLINIC | Age: 53
End: 2023-07-10

## 2023-07-10 NOTE — TELEPHONE ENCOUNTER
7/10/23 RECEIVED LONG TERM DISABILITY PAPERS FROM [facility] VIA FAX GAVE TO FLOR [employer]GAR. INFORMATION ON FORMS HAD TO BE COMPLETED BY DR. Paul Maldonado BEFORE THEY CAN BE SENT TO [facility] FOR MEDICAL RECORDS.

## 2023-07-11 ENCOUNTER — OFFICE VISIT (OUTPATIENT)
Dept: PAIN MEDICINE | Facility: CLINIC | Age: 53
End: 2023-07-11
Payer: MEDICARE

## 2023-07-11 VITALS
BODY MASS INDEX: 31.75 KG/M2 | DIASTOLIC BLOOD PRESSURE: 88 MMHG | HEART RATE: 61 BPM | WEIGHT: 215 LBS | SYSTOLIC BLOOD PRESSURE: 122 MMHG

## 2023-07-11 DIAGNOSIS — G89.4 CHRONIC PAIN SYNDROME: Primary | ICD-10-CM

## 2023-07-11 DIAGNOSIS — M46.1 SACROILIITIS (HCC): ICD-10-CM

## 2023-07-11 DIAGNOSIS — M51.16 INTERVERTEBRAL DISC DISORDER WITH RADICULOPATHY OF LUMBAR REGION: ICD-10-CM

## 2023-07-11 PROCEDURE — 99214 OFFICE O/P EST MOD 30 MIN: CPT

## 2023-07-11 NOTE — PATIENT INSTRUCTIONS
Sacroiliac Joint Injection   WHAT YOU NEED TO KNOW:   What do I need to know about a sacroiliac joint injection? A sacroiliac (SI) joint injection is done to diagnose or treat pain from sacroiliac joint syndrome. The pain caused by this syndrome may be felt in your lower back, buttocks, groin, and your thigh. How do I prepare for an SI injection? Your healthcare provider will ask you to not take any pain medicine the day of the injection. Ask him if there are any other medicines you should not take. You will need to find someone to drive you home after your procedure. What will happen during the SI injection? You will be awake for your injection. You may be given calming medicine if you are anxious. You will lie on your stomach with a pillow under your abdomen. Your healthcare provider will give you an injection of medicine to numb the area. He may use an x-ray, ultrasound, or CT scan to find the area to inject. You may also be given an injection of contrast material to help your SI joint show up better. Then, your healthcare provider will inject local anesthesia, antiinflammatory medicine, or both into your SI joint. Healthcare providers will watch you closely for any problems for up to 30 minutes after your injection. Your healthcare provider will check to see if your pain decreases after the injection. What are the risks of an SI injection? You may have some weakness for a short time after your injection. The SI injection can cause bleeding, infection, and pain. It can also cause temporary weakness in your leg and problems urinating. You may have an allergic reaction to the medicine that is injected into your SI joint. Your pain may return and you may need more treatment. CARE AGREEMENT:   You have the right to help plan your care. Learn about your health condition and how it may be treated. Discuss treatment options with your healthcare providers to decide what care you want to receive.  You always have the right to refuse treatment. The above information is an  only. It is not intended as medical advice for individual conditions or treatments. Talk to your doctor, nurse or pharmacist before following any medical regimen to see if it is safe and effective for you. © Copyright [employer] 2022 Information is for End User's use only and may not be sold, redistributed or otherwise used for commercial purposes. All illustrations and images included in CareNotes® are the copyrighted property of [employer] or [address] [place] [address] Epidural Steroid Injection, Ambulatory Care   GENERAL INFORMATION:   What do I need to know about an epidural steroid injection? An epidural steroid injection (MARGARETH) is a procedure to inject steroid medicine into the epidural space. The epidural space is between your spinal cord and vertebrae. Steroids reduce inflammation and fluid buildup in your spine that may be causing pain. You may be given pain medicine along with the steroids. How do I prepare for an MARGARETH? Your healthcare provider will talk to you about how to prepare for your procedure. He will tell you what medicines to take or not take on the day of your procedure. You may need to stop taking blood thinners or other medicines several days before your procedure. You may need to adjust any diabetes medicine you take on the day of your procedure. Steroid medicine can increase your blood sugar level. What will happen during an MARGARETH? You will be given medicine to numb the procedure area. You will be awake for the procedure, but you will not feel pain. You may also be given medicine to help you relax during the procedure. Contrast liquid will be used to help your healthcare provider see the area better. Tell the healthcare provider if you have ever had an allergic reaction to contrast liquid. Your healthcare provider may place the needle into your neck area, middle of your back, or tailbone area.  He may inject the medicine next to the nerves that are causing your pain. He may instead inject the medicine into a larger area of the epidural space. This helps the medicine spread to more nerves. Your healthcare provider will use a fluoroscope to help guide the needle to the right place. A fluoroscope is a type of x-ray. After the procedure, a bandage will be placed over the injection site to prevent infection. What are the risks of an MARGARETH? You may have temporary or permanent nerve damage or paralysis. You may have bleeding or develop a serious infection, such as meningitis (swelling of the brain coverings). An abscess may also develop. You may need surgery to fix the abscess. You may have a seizure, anxiety, or trouble sleeping. If you are a man, you may have temporary erectile dysfunction (not able to have an erection). CARE AGREEMENT:   You have the right to help plan your care. Learn about your health condition and how it may be treated. Discuss treatment options with your caregivers to decide what care you want to receive. You always have the right to refuse treatment. The above information is an  only. It is not intended as medical advice for individual conditions or treatments. Talk to your doctor, nurse or pharmacist before following any medical regimen to see if it is safe and effective for you. © 2014 [facility] is for End User's use only and may not be sold, redistributed or otherwise used for commercial purposes. All illustrations and images included in CareNotes® are the copyrighted property of [employer] or Raul Boggs.

## 2023-07-11 NOTE — PROGRESS NOTES
Assessment:  1. Chronic pain syndrome    2. Sacroiliitis ([address])    3. Intervertebral disc disorder with radiculopathy of lumbar region        Plan:    Patient follows up in the office regarding chronic bilateral low back pain with radiculopathy. Patient states in the past he has had SI joint injections as well as epidural steroid injections which he found beneficial.  On exam the patient does present with bilateral sacroiliitis which is reproducible with provocative maneuvers including straight leg greater than 60 degrees, Anirudh's maneuver, and thigh thrust.  Therefore I feel the patient would benefit from an additional bilateral sacroiliac joint injection. Patient also has pain that follows an L4 dermatome pattern bilaterally which correlates with the patient's imaging. I recommend that the patient have an additional L4 lumbar epidural steroid injection especially since the patient has found this therapeutic in the past for the same pain. Complete risks and benefits including bleeding, infection, tissue reaction, nerve injury and allergic reaction were discussed. The approach was demonstrated using models and literature was provided. Verbal and written consent was obtained. My impressions and treatment recommendations were discussed in detail with the patient who verbalized understanding and had no further questions. Discharge instructions were provided. I personally saw and examined the patient and I agree with the above discussed plan of care. Orders Placed This Encounter   Procedures   • FL spine and pain procedure     ruiz     Standing Status:   Future     Standing Expiration Date:   7/11/2027     Order Specific Question:   Reason for Exam:     Answer:   L4 LESI  (repeat)   1st     Order Specific Question:   Anticoagulant hold needed?      Answer:   no   • FL spine and pain procedure     Ruiz     Standing Status:   Future     Standing Expiration Date:   7/11/2027     Order Specific Question:   Reason for Exam:     Answer:   bilateral SI joints   (2nd)     Order Specific Question:   Anticoagulant hold needed? Answer:   no     No orders of the defined types were placed in this encounter. History of Present Illness:  Grant Mcbride is a 46 y.o. male who presents for a follow up office visit in regards to Back Pain. Patient last seen on 10/11/2022. At today's visit patient states that their pain symptoms are the same with a pain score of 7/10 on the verbal numeric pain scale. The patient's pain is worse morning. The patient's pain is constant in nature. And the quality of the patient's pain is described as burning. The patient's pain is located in the head, posterior neck, bilateral upper back, right mid back, bilateral low back, down patient's bilateral legs. I have personally reviewed and/or updated the patient's past medical history, past surgical history, family history, social history, current medications, allergies, and vital signs today. Review of Systems   Respiratory: Negative for shortness of breath. Cardiovascular: Negative for chest pain. Gastrointestinal: Negative for constipation, diarrhea, nausea and vomiting. Musculoskeletal: Positive for back pain, gait problem and joint swelling. Negative for arthralgias and myalgias. Skin: Negative for rash. Neurological: Positive for weakness. Negative for dizziness and seizures. All other systems reviewed and are negative. Patient Active Problem List   Diagnosis   • Cardiomyopathy [facility])   • Hyperlipidemia   • Premature ventricular contraction   • Lower back pain   • Herniated intervertebral disc of lumbar spine   • Sacroiliitis (HCC)   • Intervertebral disc disorder with radiculopathy of lumbar region       Past Medical History:   Diagnosis Date   • CHF (congestive heart failure) ([address])        History reviewed. No pertinent surgical history. History reviewed. No pertinent family history.     Social History     Occupational History   • Not on file   Tobacco Use   • Smoking status: Never   • Smokeless tobacco: Never   Substance and Sexual Activity   • Alcohol use: Never   • Drug use: Never   • Sexual activity: Not on file       Current Outpatient Medications on File Prior to Visit   Medication Sig   • acetaminophen (TYLENOL) 500 mg tablet Take 1 tablet by mouth every 6 (six) hours as needed     • baclofen 10 mg tablet TAKE 1 TABLET BY MOUTH 3 TIMES A DAY FOR SPASMS   • carvedilol (COREG) 25 mg tablet Take by mouth Twice daily   • cholecalciferol (VITAMIN D3) 1,000 units tablet Take 1,000 Units by mouth daily   • levothyroxine 125 mcg tablet Take 1 tablet by mouth daily   • losartan (COZAAR) 25 mg tablet Take 1 tablet by mouth daily   • Omega-3 Fatty Acids (FISH OIL PO) Take 2 g by mouth 2 (two) times a day     • Zinc 50 MG TABS Take 1 tablet by mouth as needed   • DAILY MULTIPLE VITAMINS PO Take 1 tablet by mouth daily     No current facility-administered medications on file prior to visit. Allergies   Allergen Reactions   • Other Rash     darvocet   Oxycodone       Physical Exam:    /88   Pulse 61   Wt 97.5 kg (215 lb)   BMI 31.75 kg/m²     Constitutional:normal, well developed, well nourished, alert, in no distress and non-toxic and no overt pain behavior.  and obese  Eyes:anicteric  HEENT:grossly intact  Neck:supple, symmetric, trachea midline and no masses   Pulmonary:even and unlabored  Cardiovascular:No edema or pitting edema present  Skin:Normal without rashes or lesions and well hydrated  Psychiatric:Mood and affect appropriate  Neurologic:Cranial Nerves II-XII grossly intact  Musculoskeletal:antalgic     Lumbar Spine Exam    Appearance:  Normal lordosis  Palpation/Tenderness:  left lumbar paraspinal tenderness  right lumbar paraspinal tenderness  left sacroiliac joint tenderness  right sacroiliac joint tenderness  Special Tests:  Left Straight Leg Test:  positive  Right Straight Leg Test: positive  Left Anirudh's Maneuver:  positive  Right Anirudh's Maneuver:  positive  Left Pelvic Distraction Test:  negative  Right Pelvic Distraction Test:  negative   Left thigh thrust: Positive  Right thigh thrust: Positive  Left logroll: Negative  Right logroll: Negative      This document was created using speech voice recognition software. Grammatical errors, random word insertions, pronoun errors, and incomplete sentences are an occasional consequence of this system due to software limitations, ambient noise, and hardware issues. Any formal questions or concerns about content, text, or information contained within the body of this dictation should be directly addressed to the provider for clarification.

## 2023-07-12 ENCOUNTER — TELEPHONE (OUTPATIENT)
Dept: PAIN MEDICINE | Facility: MEDICAL CENTER | Age: 53
End: 2023-07-12

## 2023-07-12 NOTE — TELEPHONE ENCOUNTER
Caller: Ava    Doctor: MISSY    Reason for call: calling for recent OV  FAX# [phone]  Ref to claim # [id number]  Call back#: [phone]

## 2023-07-17 ENCOUNTER — OFFICE VISIT (OUTPATIENT)
Dept: DERMATOLOGY | Facility: CLINIC | Age: 53
End: 2023-07-17

## 2023-07-17 VITALS — TEMPERATURE: 97.9 F | WEIGHT: 214.3 LBS | HEIGHT: 69 IN | BODY MASS INDEX: 31.74 KG/M2

## 2023-07-17 DIAGNOSIS — L85.9 HYPERKERATOSIS: Primary | ICD-10-CM

## 2023-07-17 RX ORDER — BETAMETHASONE VALERATE 0.1 %
LOTION (ML) TOPICAL
Qty: 60 ML | Refills: 0 | Status: SHIPPED | OUTPATIENT
Start: 2023-07-17

## 2023-07-17 NOTE — PROGRESS NOTES
[facility] Note     Patient Name: Gomez Berry  Encounter Date: 07/17/2023     Have you been cared for by a [facility] in the last 3 years and, if so, which description applies to you? NO. I am considered a "new" patient and must complete all patient intake questions. I am MALE/not capable of bearing children. REVIEW OF SYSTEMS:  Have you recently had or currently have any of the following? · Recent fever or chills? No  · Any non-healing wound? No   PAST MEDICAL HISTORY:  Have you personally ever had or currently have any of the following? If "YES," then please provide more detail. · Skin cancer (such as Melanoma, Basal Cell Carcinoma, Squamous Cell Carcinoma? No  · Tuberculosis, HIV/AIDS, Hepatitis B or C: No  · Systemic Immunosuppression such as Diabetes, Biologic or Immunotherapy, Chemotherapy, Organ Transplantation, Bone Marrow Transplantation No  · Radiation Treatment No   FAMILY HISTORY:  Any "first degree relatives" (parent, brother, sister, or child) with the following? • Skin Cancer, Pancreatic or Other Cancer? YES, Brother-T cell lymphoma   PATIENT EXPERIENCE:    • Do you want the Dermatologist to perform a COMPLETE skin exam today including a clinical examination under the "bra and underwear" areas? NO  • If necessary, do we have your permission to call and leave a detailed message on your Preferred Phone number that includes your specific medical information?   Yes      Allergies   Allergen Reactions   • Other Rash     darvocet   Oxycodone      Current Outpatient Medications:   •  acetaminophen (TYLENOL) 500 mg tablet, Take 1 tablet by mouth every 6 (six) hours as needed  , Disp: , Rfl:   •  baclofen 10 mg tablet, TAKE 1 TABLET BY MOUTH 3 TIMES A DAY FOR SPASMS, Disp: , Rfl: 3  •  carvedilol (COREG) 25 mg tablet, Take by mouth Twice daily, Disp: , Rfl:   •  cholecalciferol (VITAMIN D3) 1,000 units tablet, Take 1,000 Units by mouth daily, Disp: , Rfl:   • DAILY MULTIPLE VITAMINS PO, Take 1 tablet by mouth daily, Disp: , Rfl:   •  levothyroxine 125 mcg tablet, Take 1 tablet by mouth daily, Disp: , Rfl:   •  losartan (COZAAR) 25 mg tablet, Take 1 tablet by mouth daily, Disp: , Rfl:   •  Omega-3 Fatty Acids (FISH OIL PO), Take 2 g by mouth 2 (two) times a day  , Disp: , Rfl:   •  Zinc 50 MG TABS, Take 1 tablet by mouth as needed, Disp: , Rfl:           • Whom besides the patient is providing clinical information about today's encounter?   o NO ADDITIONAL HISTORIAN (patient alone provided history)    Physical Exam and Assessment/Plan by Diagnosis:      DRY SCALP  Physical Exam:  • Anatomic Location Affected:  Scalp  • Morphological Description:  Focus of hyperkeratosis, left parietal scalp  • Pertinent Positives:  • Pertinent Negatives: Additional History of Present Condition:  Patient reports having scabs from bolts used during cervical spinal fusion in 2014. Patient reports he has had itchy, flaky lumps since then. Assessment and Plan:  Based on a thorough discussion of this condition and the management approach to it (including a comprehensive discussion of the known risks, side effects and potential benefits of treatment), the patient (family) agrees to implement the following specific plan:  • Apply topical as prescribed. • If symptoms persist or worsen, a biopsy could be done in the future.       Scribe Attestation    I,:  Yu Hussein am acting as a scribe while in the presence of the attending physician.:       I,:  Sridhar Prasad MD personally performed the services described in this documentation    as scribed in my presence.:

## 2023-07-17 NOTE — PATIENT INSTRUCTIONS
DRY SCALP    Assessment and Plan:  Based on a thorough discussion of this condition and the management approach to it (including a comprehensive discussion of the known risks, side effects and potential benefits of treatment), the patient (family) agrees to implement the following specific plan:  Apply topical as prescribed. If symptoms persist or worsen, a biopsy could be done in the future.

## 2023-07-20 ENCOUNTER — HOSPITAL ENCOUNTER (OUTPATIENT)
Dept: RADIOLOGY | Age: 53
Discharge: HOME | End: 2023-07-20
Payer: MEDICARE

## 2023-07-20 DIAGNOSIS — M54.12 RADICULOPATHY, CERVICAL: ICD-10-CM

## 2023-07-20 DIAGNOSIS — M54.16 LUMBAR RADICULOPATHY: ICD-10-CM

## 2023-07-20 PROCEDURE — A9585 GADOBUTROL INJECTION: HCPCS | Performed by: PHYSICIAN ASSISTANT

## 2023-07-20 PROCEDURE — G1004 CDSM NDSC: HCPCS

## 2023-07-20 PROCEDURE — 72156 MRI NECK SPINE W/O & W/DYE: CPT

## 2023-07-20 PROCEDURE — 72148 MRI LUMBAR SPINE W/O DYE: CPT

## 2023-07-20 RX ADMIN — GADOBUTROL 10 ML: 604.72 INJECTION INTRAVENOUS at 08:46

## 2023-07-21 NOTE — TELEPHONE ENCOUNTER
As noted in Kelly's phone note, we will not be filling out disability papers for this patient. It can be sent to [facility] if records needed but we did not put him out of work. no weight-bearing restrictions

## 2023-07-27 ENCOUNTER — OFFICE VISIT (OUTPATIENT)
Dept: NEUROSURGERY | Facility: CLINIC | Age: 53
End: 2023-07-27
Payer: COMMERCIAL

## 2023-07-27 VITALS
TEMPERATURE: 97.7 F | OXYGEN SATURATION: 97 % | HEART RATE: 73 BPM | DIASTOLIC BLOOD PRESSURE: 60 MMHG | HEIGHT: 69 IN | BODY MASS INDEX: 31.55 KG/M2 | WEIGHT: 213 LBS | SYSTOLIC BLOOD PRESSURE: 100 MMHG

## 2023-07-27 DIAGNOSIS — G89.29 CHRONIC BILATERAL LOW BACK PAIN WITHOUT SCIATICA: Primary | ICD-10-CM

## 2023-07-27 DIAGNOSIS — M54.50 CHRONIC BILATERAL LOW BACK PAIN WITHOUT SCIATICA: Primary | ICD-10-CM

## 2023-07-27 PROCEDURE — 99213 OFFICE O/P EST LOW 20 MIN: CPT | Performed by: PHYSICIAN ASSISTANT

## 2023-07-27 NOTE — PROGRESS NOTES
Neurosurgery Office Note  Kendall Sr 46 y.o. male MRN: [id number]      Assessment/Plan   Patient is a 46 yr sold gentleman with PMHx of Cardiomyopathy, Hyperlipidemia, PVC, s/p C5-C7 ACDF in 2014 DKO. He is here today to go over the results his cervical and lumbar MRIs and cervical spine flex/ext x-rays. Patient with   progressive posterior neck pain and intermittent bilateral UE numbness and paresthesia  usually associated with stretching outward and during working. Spontaneous twitches, spasms in the UE at rest , residual from previous cervical spinal cord insult. Images shows multilevel DJD, but not remarkable for surgical candidacy. Flexion-Extension Cx spine x-rays shows stable Hard ware and without dynamic instability or adjacent segment Degeneration. Exam-A&OX3. Carmen. Finger-to-nose test is normal and without drift bilaterally. Spontaneous RUE abnormal jerky movement at rest. Strength  including  and fine motor functions appears normal. Elbow flexion/Extension & Shoulder abduction 5/5 bilaterally. DTR 2+ bilat in both upper and Lower extremites. Healed anterior cervical surgical wound. Stable gait. Hx, PEx, and images results were reviewed with the patient and his wife. Mx plan discussed. Patient's images shows non surgical DJD at this time. Patient says he had EMG tests of right UE and was Dx with Right CTS and planned to have a CTR procedure. He has appointment with pain Mx. Referral for PT order placed. Advised to continue conservative Mx. Call with questions or concerns. All questions and concerns were answered to patient's satisfaction. Patient verbalized his understandings and agreed with the plan. Plan:  1. Continue follow up with pain Mx  2. Advised to go ahead and have  R CTR procedure  3. Ambulatory referral to PT  4. Call office if symptoms fail to improve after CTR and conservative Mx-Consider referral to [facility]KO  (patient preference)  5.  Call with questions or cocnerns      I have spent a total time of 45 minutes on 07/27/23 in caring for this patient including Diagnostic results, Prognosis, Risks and benefits of tx options, Instructions for management, Patient and family education, Importance of tx compliance, Risk factor reductions, Impressions, Counseling / Coordination of care, Documenting in the medical record, Reviewing / ordering tests, medicine, procedures   and Obtaining or reviewing history  . Chief Complaint   Patient presents with   • Follow-up           History of Present Illness     C/C: " Here to go over the results of his images"    HPI     All patient's medical histories were reviewed and updated as follows: Allergies, current medication lists, past medical Hx, past surgical history, family Hx, social Hx and current medical lists. Patient is a 46 yr sold gentleman with PMHx of Cardiomyopathy, Hyperlipidemia, PVC, s/p C5-C7 ACDF in 2014 [facility]. Patient reports continuous neck pain with Bilat UE paresthesia and numbness. Symptoms associated with certain activities and position. No gait issues, weakness in the extremities. MRI cervical spine multilevel DJD, but no Adjacent segment Degeneration. Lumbar spine mild to moderate DJD, with varying degrees of NFN, but no significant change to consider surgery. Flexion -extension Cx spine xrays  Stable hard ware and without dynamic instability. Patient had Appt with pain Mx, planned to have R CTR. REVIEW OF SYSTEMS    Review of Systems   Constitutional: Negative. HENT: Negative for tinnitus. Eyes: Negative for visual disturbance. Respiratory: Negative for apnea, shortness of breath and wheezing. Cardiovascular: Negative for chest pain. H/o HTN   Gastrointestinal: Negative. Endocrine:        H/o Thyroid Disease   Genitourinary: Negative.     Musculoskeletal: Positive for arthralgias (some pain inhis knees ), back pain (constant lower back pain and upper back pain ), neck pain (radiates to b/l arms R>L) and neck stiffness. Patient is right handed. has difficulty writing and dropping things. Currently doing pt at [facility] for left shoulder     Right leg gives out on him     2/10 pain scale , worse with movement or walking   Skin: Negative. Neurological: Positive for tremors (involuntary movements in his right arm ), weakness (dropping things in his hand , Difficulty reaching and raising his left arm. . weakness in knees having difficultly liftinghimself off the floor ), light-headedness (when he goes from laying down to sitting too quickly ), numbness (dropping things from his hands , N/T on b/l R>L. Difficulty fine finger motion  R>L. Numbness on right thimb and middle finger.) and headaches. Negative for dizziness and seizures. Hematological: Negative. All other systems reviewed and are negative. ROS obtained by MA. Reviewed. See HPI. Meds/Allergies     Current Outpatient Medications   Medication Sig Dispense Refill   • acetaminophen (TYLENOL) 500 mg tablet Take 1 tablet by mouth every 6 (six) hours as needed       • baclofen 10 mg tablet TAKE 1 TABLET BY MOUTH 3 TIMES A DAY FOR SPASMS  3   • betamethasone valerate (VALISONE) 0.1 % lotion Apply sparingly to affected area(s) of scalp once or twice a day as needed. 60 mL 0   • carvedilol (COREG) 25 mg tablet Take by mouth Twice daily     • cholecalciferol (VITAMIN D3) 1,000 units tablet Take 1,000 Units by mouth daily     • DAILY MULTIPLE VITAMINS PO Take 1 tablet by mouth daily     • levothyroxine 125 mcg tablet Take 1 tablet by mouth daily     • losartan (COZAAR) 25 mg tablet Take 1 tablet by mouth daily     • Omega-3 Fatty Acids (FISH OIL PO) Take 2 g by mouth 2 (two) times a day       • Zinc 50 MG TABS Take 1 tablet by mouth as needed       No current facility-administered medications for this visit.        Allergies   Allergen Reactions   • Oxycodone Nausea Only   • Other Rash     darvocet   Oxycodone       PAST HISTORY    Past Medical History:   Diagnosis Date   • CHF (congestive heart failure) ([address])        History reviewed. No pertinent surgical history. Social History     Tobacco Use   • Smoking status: Never   • Smokeless tobacco: Never   Vaping Use   • Vaping Use: Never used   Substance Use Topics   • Alcohol use: Never   • Drug use: Never       History reviewed. No pertinent family history. Above history personally reviewed. EXAM    Vitals:Blood pressure 100/60, pulse 73, temperature 97.7 °F (36.5 °C), temperature source Temporal, height 5' 9" (1.753 m), weight 96.6 kg (213 lb), SpO2 97 %. ,Body mass index is 31.45 kg/m². Physical Exam  Constitutional:       Appearance: Normal appearance. HENT:      Head: Normocephalic and atraumatic. Musculoskeletal:      Cervical back: No tenderness. Neurological:      General: No focal deficit present. Mental Status: He is alert and oriented to person, place, and time. GCS: GCS eye subscore is 4. GCS verbal subscore is 5. GCS motor subscore is 6. Cranial Nerves: Cranial nerves 2-12 are intact. Sensory: Sensory deficit present. Motor: Motor function is intact. Deep Tendon Reflexes: Reflexes are normal and symmetric. Reflex Scores:       Tricep reflexes are 2+ on the right side and 2+ on the left side. Bicep reflexes are 2+ on the right side and 2+ on the left side. Brachioradialis reflexes are 2+ on the right side and 2+ on the left side. Patellar reflexes are 2+ on the right side and 2+ on the left side. Achilles reflexes are 2+ on the right side and 2+ on the left side. Psychiatric:         Speech: Speech normal.         Neurologic Exam     Mental Status   Oriented to person, place, and time. Speech: speech is normal   Level of consciousness: alert    Cranial Nerves   Cranial nerves II through XII intact.      CN III, IV, VI   Nystagmus: none     CN XI   CN XI normal.     Motor Exam   Muscle bulk: normal  Overall muscle tone: normal  Right arm tone: normal  Left arm tone: normal  Right arm pronator drift: absent  Left arm pronator drift: absent  Right leg tone: normal  Left leg tone: normal    Sensory Exam   Right arm light touch: decreased from fingers    Gait, Coordination, and Reflexes     Reflexes   Right brachioradialis: 2+  Left brachioradialis: 2+  Right biceps: 2+  Left biceps: 2+  Right triceps: 2+  Left triceps: 2+  Right patellar: 2+  Left patellar: 2+  Right achilles: 2+  Left achilles: 2+  Right : 2+  Left : 2+  Right Sims: absent  Left Sims: absent  Right ankle clonus: absent  Left pendular knee jerk: absent        MEDICAL DECISION MAKING    Imaging Studies:     MRI cervical spine with and without contrast    Result Date: 7/25/2023  Narrative: MRI CERVICAL SPINE WITH AND WITHOUT CONTRAST INDICATION: M54.12: Radiculopathy, cervical region. COMPARISON: MR cervical spine 2/12/2014 TECHNIQUE:  Multiplanar, multisequence imaging of the cervical spine was performed before and after gadolinium administration. . IV Contrast:  10 mL of Gadobutrol injection (SINGLE-DOSE) IMAGE QUALITY:  Diagnostic. FINDINGS: ALIGNMENT: There is straightening of normal cervical lordosis. Interval postsurgical changes status post ACDF at C6-T1 MARROW SIGNAL: No abnormal bone marrow signal or suspicious discrete lesion. CERVICAL AND VISUALIZED UPPER THORACIC CORD:  Normal signal within the visualized cord. PREVERTEBRAL AND PARASPINAL SOFT TISSUES:  Normal. VISUALIZED POSTERIOR FOSSA:  The visualized posterior fossa demonstrates no abnormal signal. CERVICAL DISC SPACES: C2-C3: No disc bulge. No canal or foraminal stenosis. C3-C4: No disc bulge. Minimal uncovertebral spurring. No canal or significant foraminal stenosis. C4-C5: Disc osteophyte complex and uncovertebral spurring resulting in mild canal stenosis, moderate right and mild left foraminal narrowing.  C5-C6: Disc osteophyte complex and uncovertebral spurring resulting in mild canal stenosis, moderate right and mild left foraminal narrowing. C6-C7: Status post ACDF. Marginal endplate and uncovertebral spurring. No canal stenosis. Mild left foraminal narrowing. C7-T1: Status post ACDF. No canal stenosis. Mild left foraminal narrowing secondary to uncovertebral spurring. UPPER THORACIC DISC SPACES: Mild degenerative canal narrowing in partially imaged upper thoracic spine most pronounced at T2-3. POSTCONTRAST IMAGING:  Normal. OTHER FINDINGS:  None. Impression: 1. Patient is status post ACDF at C6-T1. 2.  Mild degenerative canal stenosis, moderate right and mild left foraminal narrowing at levels C4-5 and C5-6. Workstation performed: [id number]     MRI lumbar spine without contrast    Result Date: 7/25/2023  Narrative: MRI LUMBAR SPINE WITHOUT CONTRAST INDICATION: M54.16: Radiculopathy, lumbar region. COMPARISON:MR Lumbar spine 6/10/2022 TECHNIQUE:  Multiplanar, multisequence imaging of the lumbar spine was performed. . IMAGE QUALITY:  Diagnostic FINDINGS: VERTEBRAL BODIES:  There is a transitional lumbosacral junction with partially sacralized L5. There is minimal retrolisthesis at L2-3. No bone marrow signal abnormality or suspicious discrete lesion. SACRUM:  Normal signal within the sacrum. No evidence of insufficiency or stress fracture. DISTAL CORD AND CONUS:  Normal size and signal within the distal cord and conus. PARASPINAL SOFT TISSUES:  Paraspinal soft tissues are unremarkable. LOWER THORACIC DISC SPACES:  Mild noncompressive lower thoracic degenerative change. LUMBAR DISC SPACES: L1-L2: Mild disc bulge. Moderate facet arthropathy. Mild canal narrowing. No significant foraminal stenosis. L2-L3: Disc bulge. Small left foraminal disc protrusion with marginal endplate spurring. Moderate facet arthropathy. Mild canal stenosis. Moderate left and mild right foraminal narrowing L3-L4: Disc bulge. Moderate facet arthropathy.  Moderate right and mild-to-moderate left foraminal narrowing. L4-L5: Disc bulge mild facet arthropathy. No stenosis. Mild bilateral foraminal narrowing L5-S1: Transitional level. No disc bulge. Neuropathy. No canal or foraminal stenosis. OTHER FINDINGS: A few tiny T2 hyperintense renal lesions statistically favored to represent benign cysts. Impression: 1. There is transitional lumbosacral junction with partially sacralized L5. 2.  Degenerative change with multilevel mild canal and mild to moderate foraminal narrowing as detailed. Workstation performed: [id number]     XR spine cervical complete 6+ vw flex/ext/obl    Result Date: 7/11/2023  Narrative: CERVICAL SPINE INDICATION:   M54.12: Radiculopathy, cervical region. COMPARISON: Cervical spine 2/19/2015 VIEWS:  XR SPINE CERVICAL COMPLETE 6+ VW FLEX /EXT /OBL FINDINGS: No fracture. Stable postoperative alignment status post anterior fusion of C6-T1. No evidence of acute complication. Normal alignment without subluxation. Mild disc height loss and endplate degenerative change at C4-5 and C5-6. No evidence of dynamic instability seen with flexion or extension. The prevertebral soft tissues are within normal limits. The lung apices are clear. Impression: No acute osseous abnormality. Stable postoperative alignment status post anterior fusion of C6-T1.  Workstation performed: [id number]       I have personally reviewed pertinent reports.  , I have personally reviewed pertinent films in PACS and I have personally reviewed pertinent films in PACS with a Radiologist.

## 2023-08-03 ENCOUNTER — TELEPHONE (OUTPATIENT)
Dept: PAIN MEDICINE | Facility: CLINIC | Age: 53
End: 2023-08-03

## 2023-08-03 ENCOUNTER — HOSPITAL ENCOUNTER (OUTPATIENT)
Dept: RADIOLOGY | Facility: CLINIC | Age: 53
Discharge: HOME | End: 2023-08-03
Payer: MEDICARE

## 2023-08-03 VITALS
HEART RATE: 59 BPM | RESPIRATION RATE: 20 BRPM | DIASTOLIC BLOOD PRESSURE: 69 MMHG | TEMPERATURE: 97.7 F | OXYGEN SATURATION: 97 % | SYSTOLIC BLOOD PRESSURE: 107 MMHG

## 2023-08-03 DIAGNOSIS — M51.16 INTERVERTEBRAL DISC DISORDER WITH RADICULOPATHY OF LUMBAR REGION: ICD-10-CM

## 2023-08-03 PROCEDURE — 62323 NJX INTERLAMINAR LMBR/SAC: CPT | Performed by: ANESTHESIOLOGY

## 2023-08-03 RX ORDER — METHYLPREDNISOLONE ACETATE 80 MG/ML
80 INJECTION, SUSPENSION INTRA-ARTICULAR; INTRALESIONAL; INTRAMUSCULAR; PARENTERAL; SOFT TISSUE ONCE
Status: COMPLETED | OUTPATIENT
Start: 2023-08-03 | End: 2023-08-03

## 2023-08-03 RX ORDER — BUPIVACAINE HCL/PF 2.5 MG/ML
2 VIAL (ML) INJECTION ONCE
Status: COMPLETED | OUTPATIENT
Start: 2023-08-03 | End: 2023-08-03

## 2023-08-03 RX ADMIN — BUPIVACAINE HYDROCHLORIDE 2 ML: 2.5 INJECTION, SOLUTION EPIDURAL; INFILTRATION; INTRACAUDAL at 14:41

## 2023-08-03 RX ADMIN — METHYLPREDNISOLONE ACETATE 80 MG: 80 INJECTION, SUSPENSION INTRA-ARTICULAR; INTRALESIONAL; INTRAMUSCULAR; PARENTERAL; SOFT TISSUE at 14:41

## 2023-08-03 RX ADMIN — IOHEXOL 1 ML: 300 INJECTION, SOLUTION INTRAVENOUS at 14:41

## 2023-08-03 NOTE — TELEPHONE ENCOUNTER
Caller: Vee Cosme     Doctor: Ranulfo Hein     Reason for call: Patient has procedure today at 2:30 pm he states nobody called to tell him pre-procedure instructions. I went over pre-procedure instructions below:  Nothing to eat or drink 1 hour prior to procedure  Need to arrange transportation  Proper clothing for procedure  If ill infection or placed on antibiotics,  please call to reschedule  No vaccine 2 weeks before or after procedure     If I missed anything please call him   thank you.       Call back#: [phone]

## 2023-08-03 NOTE — DISCHARGE INSTR - LAB
Epidural Steroid Injection   WHAT YOU NEED TO KNOW:   An epidural steroid injection (MARGARETH) is a procedure to inject steroid medicine into the epidural space. The epidural space is between your spinal cord and vertebrae. Steroids reduce inflammation and fluid buildup in your spine that may be causing pain. You may be given pain medicine along with the steroids. ACTIVITY  Do not drive or operate machinery today. No strenuous activity today - bending, lifting, etc.  You may resume normal activites starting tomorrow - start slowly and as tolerated. You may shower today, but no tub baths or hot tubs. You may have numbness for several hours from the local anesthetic. Please use caution and common sense, especially with weight-bearing activities. CARE OF THE INJECTION SITE  If you have soreness or pain, apply ice to the area today (20 minutes on/20 minutes off). Starting tomorrow, you may use warm, moist heat or ice if needed. You may have an increase or change in your discomfort for 36-48 hours after your treatment. Apply ice and continue with any pain medication you have been prescribed. Notify the [facility] if you have any of the following: redness, drainage, swelling, headache, stiff neck or fever above 100°F.    SPECIAL INSTRUCTIONS  Our office will contact you in approximately 7 days for a progress report. MEDICATIONS  Continue to take all routine medications. Our office may have instructed you to hold some medications. As no general anesthesia was used in today's procedure, you should not experience any side effects related to anesthesia. If you are diabetic, the steroids used in today's injection may temporarily increase your blood sugar levels after the first few days after your injection. Please keep a close eye on your sugars and alert the doctor who manages your diabetes if your sugars are significantly high from your baseline or you are symptomatic.      If you have a problem specifically related to your procedure, please call our office at [phone]. Problems not related to your procedure should be directed to your primary care physician.

## 2023-08-03 NOTE — H&P
History of Present Illness: The patient is a 46 y.o. male who presents with complaints of lower back and leg pain and is here today for a lumbar epidural steroid injection    Past Medical History:   Diagnosis Date   • CHF (congestive heart failure) ([address])        No past surgical history on file. Current Outpatient Medications:   •  acetaminophen (TYLENOL) 500 mg tablet, Take 1 tablet by mouth every 6 (six) hours as needed  , Disp: , Rfl:   •  baclofen 10 mg tablet, TAKE 1 TABLET BY MOUTH 3 TIMES A DAY FOR SPASMS, Disp: , Rfl: 3  •  betamethasone valerate (VALISONE) 0.1 % lotion, Apply sparingly to affected area(s) of scalp once or twice a day as needed. , Disp: 60 mL, Rfl: 0  •  carvedilol (COREG) 25 mg tablet, Take by mouth Twice daily, Disp: , Rfl:   •  cholecalciferol (VITAMIN D3) 1,000 units tablet, Take 1,000 Units by mouth daily, Disp: , Rfl:   •  DAILY MULTIPLE VITAMINS PO, Take 1 tablet by mouth daily, Disp: , Rfl:   •  levothyroxine 125 mcg tablet, Take 1 tablet by mouth daily, Disp: , Rfl:   •  losartan (COZAAR) 25 mg tablet, Take 1 tablet by mouth daily, Disp: , Rfl:   •  Omega-3 Fatty Acids (FISH OIL PO), Take 2 g by mouth 2 (two) times a day  , Disp: , Rfl:   •  Zinc 50 MG TABS, Take 1 tablet by mouth as needed, Disp: , Rfl:     Current Facility-Administered Medications:   •  bupivacaine (PF) (MARCAINE) 0.25 % injection 2 mL, 2 mL, Epidural, Once, Jose Álvarez MD  •  iohexol (OMNIPAQUE) 300 mg/mL injection 1 mL, 1 mL, Epidural, Once, Jose Álvarez MD  •  methylPREDNISolone acetate (DEPO-MEDROL) injection 80 mg, 80 mg, Epidural, Once, Jose Álvarez MD    Allergies   Allergen Reactions   • Oxycodone Nausea Only   • Other Rash     darvocet   Oxycodone       Physical Exam:   Vitals:    08/03/23 1422   BP: 112/64   Pulse: 60   Resp: 20   Temp: 97.7 °F (36.5 °C)   SpO2: 97%     General: Awake, Alert, Oriented x 3, Mood and affect appropriate  Respiratory: Respirations even and unlabored  Cardiovascular: Peripheral pulses intact; no edema  Musculoskeletal Exam: Lower back pain    ASA Score: 3    Patient/Chart Verification  Patient ID Verified: Verbal  Consents Confirmed: To be obtained in the Pre-Procedure area  Interval H&P(within 24 hr) Complete (required for Outpatients and Surgery Admit only): To be obtained in the Pre-Procedure area  Allergies Reviewed: Yes  Anticoag/NSAID held?: NA  Currently on antibiotics?: No    Assessment:   1.  Intervertebral disc disorder with radiculopathy of lumbar region        Plan: L4 LESI  (repeat)   1st

## 2023-08-10 ENCOUNTER — TELEPHONE (OUTPATIENT)
Dept: RADIOLOGY | Facility: MEDICAL CENTER | Age: 53
End: 2023-08-10

## 2023-08-11 NOTE — TELEPHONE ENCOUNTER
Told pt that FQ is out of office until Monday 8/14/23. RN did advise pt that when this happened previously after his prior inj FQ recommended Tonic water w/ Quinine TID. Pt had forgotten about that and will give it a try. He said he tried eating bananas and peanut butter that hasn't helped.    Told pt if  has any other rec for the legs cramps upon his return we will contact him

## 2023-08-11 NOTE — TELEPHONE ENCOUNTER
.Caller: pt    Doctor: Q    Reason for call: pt has had some relief, pain level is a 2-3/10. His legs are cramping again, he said this happened the last time. What should he do about that?  Please have a nurse follow up     Call back#: [phone]

## 2023-08-14 NOTE — TELEPHONE ENCOUNTER
MD Aware.  Give steroid a little more time and please f/u with patient next week    Agree with tonic water with quinine

## 2023-08-17 NOTE — TELEPHONE ENCOUNTER
MD Aware.  Patient should call back if symtoms worsen Pt is alert and oriented x4, pt reported pain 5/10. Pain managed with 2mg  Dilaudid PRN. On modified MAP. Pt is mostly continent, 1 episode of incontinence this shift. Pt had diarrhea, brief changed and commode set up in room. Pt has oxymetry monitoring set up for overnight, pad in room for documenting any low sat episodes and disconnects. Pt discharging 7/30/22. Able to make needs known and uses the call light appropriately. Continue with the POC.    Patient's most recent vital signs are:    Vital signs:  BP: 98/63  Temp: 98.7  HR: 90  RR: 8  SpO2: 98 %    Patient does not have new respiratory symptoms.  Patient does not have new sore throat.  Patient does not have a fever greater than 99.5.

## 2023-08-23 ENCOUNTER — OFFICE VISIT (OUTPATIENT)
Dept: CARDIOLOGY CLINIC | Facility: CLINIC | Age: 53
End: 2023-08-23
Payer: MEDICARE

## 2023-08-23 VITALS
DIASTOLIC BLOOD PRESSURE: 68 MMHG | BODY MASS INDEX: 31.8 KG/M2 | SYSTOLIC BLOOD PRESSURE: 96 MMHG | HEIGHT: 69 IN | OXYGEN SATURATION: 98 % | WEIGHT: 214.7 LBS | HEART RATE: 66 BPM

## 2023-08-23 DIAGNOSIS — E78.2 MIXED HYPERLIPIDEMIA: ICD-10-CM

## 2023-08-23 DIAGNOSIS — I42.9 CARDIOMYOPATHY, UNSPECIFIED TYPE (HCC): Primary | ICD-10-CM

## 2023-08-23 DIAGNOSIS — M46.1 SACROILIITIS (HCC): ICD-10-CM

## 2023-08-23 DIAGNOSIS — I49.3 PREMATURE VENTRICULAR CONTRACTION: ICD-10-CM

## 2023-08-23 PROCEDURE — 99214 OFFICE O/P EST MOD 30 MIN: CPT | Performed by: INTERNAL MEDICINE

## 2023-08-23 NOTE — PROGRESS NOTES
Cardiology Follow Up    Leonardo Franco  1970  [id number]  [facility] [place]  [address] [place] [address]    1. Cardiomyopathy, unspecified type [facility])  Ambulatory referral to Cardiac Electrophysiology      2. Premature ventricular contraction  Ambulatory referral to Cardiac Electrophysiology      3. Sacroiliitis ([address] )        4. Mixed hyperlipidemia            Discussion/Summary: Recent MRI was reviewed ejection fraction 35% visually unable to gait due to high burden PVCs. We will refer back to electrophysiology to talk about ICD implantation. He has no heart failure symptoms functionally has been doing well he is euvolemic on exam.  His blood pressure will not tolerate any further afterload reducing agents and hyperkalemia precludes the use of spironolactone I feel he has been on appropriate goal-directed medical therapy. Interval History:   Follow-up visit. He has a history of nonischemic cardiomyopathy ejection fraction 50%. 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 Underwent ablation but was unable to get all the PVCs secondary to a site that was close to the aorta. He continues to remain quite physically active. Denies any chest pain, shortness of breath, palpitations, lightheadedness, dizziness, or syncope. Is been a lower extremity edema, PND, orthopnea. Is been taking her medications as prescribed.     Problem List     Cardiomyopathy [facility])    Overview Signed 4/19/2018  8:02 AM by Bess Dorman DO     Description: Nonischemic cardiomyopathy, most recent ejection fraction 53%         Hyperlipidemia    Premature ventricular contraction        Past Medical History:   Diagnosis Date   • CHF (congestive heart failure) ([address] )      Social History     Socioeconomic History   • Marital status: /Civil Union     Spouse name: Not on file   • Number of children: Not on file   • Years of education: Not on file   • Highest education level: Not on file   Occupational History   • Not on file   Tobacco Use   • Smoking status: Never   • Smokeless tobacco: Never   Vaping Use   • Vaping Use: Never used   Substance and Sexual Activity   • Alcohol use: Never   • Drug use: Never   • Sexual activity: Not on file   Other Topics Concern   • Not on file   Social History Narrative   • Not on file     Social Determinants of Health     Financial Resource Strain: Not on file   Food Insecurity: Not on file   Transportation Needs: Not on file   Physical Activity: Not on file   Stress: Not on file   Social Connections: Not on file   Intimate Partner Violence: Not on file   Housing Stability: Not on file      No family history on file. No past surgical history on file. Current Outpatient Medications:   •  acetaminophen (TYLENOL) 500 mg tablet, Take 1 tablet by mouth every 6 (six) hours as needed  , Disp: , Rfl:   •  baclofen 10 mg tablet, TAKE 1 TABLET BY MOUTH 3 TIMES A DAY FOR SPASMS, Disp: , Rfl: 3  •  carvedilol (COREG) 25 mg tablet, Take by mouth Twice daily, Disp: , Rfl:   •  cholecalciferol (VITAMIN D3) 1,000 units tablet, Take 1,000 Units by mouth daily, Disp: , Rfl:   •  DAILY MULTIPLE VITAMINS PO, Take 1 tablet by mouth daily, Disp: , Rfl:   •  levothyroxine 125 mcg tablet, Take 1 tablet by mouth daily, Disp: , Rfl:   •  losartan (COZAAR) 25 mg tablet, Take 1 tablet by mouth daily, Disp: , Rfl:   •  Omega-3 Fatty Acids (FISH OIL PO), Take 2 g by mouth 2 (two) times a day  , Disp: , Rfl:   •  Zinc 50 MG TABS, Take 1 tablet by mouth as needed, Disp: , Rfl:   •  betamethasone valerate (VALISONE) 0.1 % lotion, Apply sparingly to affected area(s) of scalp once or twice a day as needed.  (Patient not taking: Reported on 8/23/2023), Disp: 60 mL, Rfl: 0  Allergies   Allergen Reactions   • Oxycodone Nausea Only   • Other Rash     darvocet   Oxycodone       Labs:     Chemistry        Component Value Date/Time     03/22/2014 7438 K 5.4 (H) 06/14/2023 0913    K 4.0 03/22/2014 0427     (H) 06/14/2023 0913     03/22/2014 0427    CO2 30 06/14/2023 0913    CO2 26 03/22/2014 0427    BUN 18 06/14/2023 0913    BUN 15 03/22/2014 0427    CREATININE 1.14 06/14/2023 0913    CREATININE 0.89 03/22/2014 0427        Component Value Date/Time    CALCIUM 9.5 06/14/2023 0913    CALCIUM 9.3 03/22/2014 0427    ALKPHOS 39 (L) 06/14/2023 0913    ALKPHOS 45 (L) 03/21/2014 1634    AST 31 06/14/2023 0913    AST 21 03/21/2014 1634    ALT 33 06/14/2023 0913    ALT 31 03/21/2014 1634    BILITOT 0.65 03/21/2014 1634            Lab Results   Component Value Date    CHOL 172 08/08/2016     Lab Results   Component Value Date    HDL 38 (L) 06/14/2023    HDL 39 (L) 04/29/2022    HDL 35 (L) 05/01/2019     Lab Results   Component Value Date    LDLCALC 135 (H) 06/14/2023    LDLCALC 132 (H) 04/29/2022    LDLCALC 115 (H) 05/01/2019     Lab Results   Component Value Date    TRIG 110 06/14/2023    TRIG 86 04/29/2022    TRIG 331 (H) 05/01/2019     No results found for: "CHOLHDL"    Imaging: No results found. Review of Systems   Constitutional: Negative. HENT: Negative. Eyes: Negative. Cardiovascular: Negative. Respiratory: Negative. Endocrine: Negative. Hematologic/Lymphatic: Negative. Skin: Negative. Musculoskeletal: Negative. Gastrointestinal: Negative. Genitourinary: Negative. Neurological: Negative. Psychiatric/Behavioral: Negative. All other systems reviewed and are negative. Vitals:    08/23/23 0904   BP: 96/68   Pulse: 66   SpO2: 98%     Vitals:    08/23/23 0904   Weight: 97.4 kg (214 lb 11.2 oz)     Height: 5' 9" (175.3 cm)   Body mass index is 31.71 kg/m².     Physical Exam:  Vital signs reviewed  General:  Alert and cooperative, appears stated age, no acute distress  HEENT:  PERRLA, EOMI, no scleral icterus, no conjunctival pallor  Neck:  No lymphadenopathy, no thyromegaly, no carotid bruits, no elevated JVP  Heart:  Regular rate and rhythm, normal S1/S2, no S3/S4, no murmur, rubs or gallops. PMI nondisplaced  Lungs:  Clear to auscultation bilaterally, no wheezes rales or rhonchi  Abdomen:  Soft, non-tender, positive bowel sounds, no rebound or guarding,   no organomegaly   Extremities:  Normal range of motion.   No clubbing, cyanosis or edema   Vascular:  2+ pedal pulses  Skin:  No rashes or lesions on exposed skin  Neurologic:  Cranial nerves II-XII grossly intact without focal deficits  Psych:  Normal mood and affect

## 2023-08-23 NOTE — PROGRESS NOTES
Cardiology Follow Up    Kraig Mercado  1970  [id number]  HEART & VASCULAR [facility] [place]  [address] [facility]    1. Cardiomyopathy, unspecified type [facility])  Ambulatory referral to Cardiac Electrophysiology      2. Premature ventricular contraction  Ambulatory referral to Cardiac Electrophysiology      3. Sacroiliitis ([address] [facility])        4. Mixed hyperlipidemia            Discussion/Summary: Cardiac MRI showed EF 35% will refer back to electrophysiology defibrillator. He has been on carvedilol and losartan he has hypotension I do not think he would tolerate any other afterload reducing agents. He also had hyperkalemia I do not think spironolactone would be a good idea either. Fluid status is optimized. Recent burden on      Interval History:   Follow-up visit. He has a history of nonischemic cardiomyopathy ejection fraction 50%. 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 Underwent ablation but was unable to get all the PVCs secondary to a site that was close to the aorta. Is been a few years since he has been here to the office. He returns to reestablish. Echocardiogram was ordered prior to visit. Functionally has been doing well he is able to get around the house and denies any exertional limitations. There has been no chest pain, shortness of breath, palpitations, lightheadedness, dizziness, or syncope. There is been a lower extremity edema, PND, orthopnea. Is been taking her medications as prescribed.     Problem List     Cardiomyopathy [facility])    Overview Signed 4/19/2018  8:02 AM by Nataly Demarco DO     Description: Nonischemic cardiomyopathy, most recent ejection fraction 53%         Hyperlipidemia    Premature ventricular contraction        Past Medical History:   Diagnosis Date   • CHF (congestive heart failure) ([address] [facility])      Social History     Socioeconomic History   • Marital status: /Civil Union     Spouse name: Not on file   • Number of children: Not on file   • Years of education: Not on file   • Highest education level: Not on file   Occupational History   • Not on file   Tobacco Use   • Smoking status: Never   • Smokeless tobacco: Never   Vaping Use   • Vaping Use: Never used   Substance and Sexual Activity   • Alcohol use: Never   • Drug use: Never   • Sexual activity: Not on file   Other Topics Concern   • Not on file   Social History Narrative   • Not on file     Social Determinants of Health     Financial Resource Strain: Not on file   Food Insecurity: Not on file   Transportation Needs: Not on file   Physical Activity: Not on file   Stress: Not on file   Social Connections: Not on file   Intimate Partner Violence: Not on file   Housing Stability: Not on file      No family history on file. No past surgical history on file. Current Outpatient Medications:   •  acetaminophen (TYLENOL) 500 mg tablet, Take 1 tablet by mouth every 6 (six) hours as needed  , Disp: , Rfl:   •  baclofen 10 mg tablet, TAKE 1 TABLET BY MOUTH 3 TIMES A DAY FOR SPASMS, Disp: , Rfl: 3  •  carvedilol (COREG) 25 mg tablet, Take by mouth Twice daily, Disp: , Rfl:   •  cholecalciferol (VITAMIN D3) 1,000 units tablet, Take 1,000 Units by mouth daily, Disp: , Rfl:   •  DAILY MULTIPLE VITAMINS PO, Take 1 tablet by mouth daily, Disp: , Rfl:   •  levothyroxine 125 mcg tablet, Take 1 tablet by mouth daily, Disp: , Rfl:   •  losartan (COZAAR) 25 mg tablet, Take 1 tablet by mouth daily, Disp: , Rfl:   •  Omega-3 Fatty Acids (FISH OIL PO), Take 2 g by mouth 2 (two) times a day  , Disp: , Rfl:   •  Zinc 50 MG TABS, Take 1 tablet by mouth as needed, Disp: , Rfl:   •  betamethasone valerate (VALISONE) 0.1 % lotion, Apply sparingly to affected area(s) of scalp once or twice a day as needed.  (Patient not taking: Reported on 8/23/2023), Disp: 60 mL, Rfl: 0  Allergies   Allergen Reactions   • Oxycodone Nausea Only   • Other Rash     darvocet   Oxycodone Labs:     Chemistry        Component Value Date/Time     03/22/2014 0427    K 5.4 (H) 06/14/2023 0913    K 4.0 03/22/2014 0427     (H) 06/14/2023 0913     03/22/2014 0427    CO2 30 06/14/2023 0913    CO2 26 03/22/2014 0427    BUN 18 06/14/2023 0913    BUN 15 03/22/2014 0427    CREATININE 1.14 06/14/2023 0913    CREATININE 0.89 03/22/2014 0427        Component Value Date/Time    CALCIUM 9.5 06/14/2023 0913    CALCIUM 9.3 03/22/2014 0427    ALKPHOS 39 (L) 06/14/2023 0913    ALKPHOS 45 (L) 03/21/2014 1634    AST 31 06/14/2023 0913    AST 21 03/21/2014 1634    ALT 33 06/14/2023 0913    ALT 31 03/21/2014 1634    BILITOT 0.65 03/21/2014 1634            Lab Results   Component Value Date    CHOL 172 08/08/2016     Lab Results   Component Value Date    HDL 38 (L) 06/14/2023    HDL 39 (L) 04/29/2022    HDL 35 (L) 05/01/2019     Lab Results   Component Value Date    LDLCALC 135 (H) 06/14/2023    LDLCALC 132 (H) 04/29/2022    LDLCALC 115 (H) 05/01/2019     Lab Results   Component Value Date    TRIG 110 06/14/2023    TRIG 86 04/29/2022    TRIG 331 (H) 05/01/2019     No results found for: "CHOLHDL"    Imaging: No results found. Review of Systems   Constitutional: Negative. HENT: Negative. Eyes: Negative. Cardiovascular: Negative. Respiratory: Negative. Endocrine: Negative. Hematologic/Lymphatic: Negative. Skin: Negative. Musculoskeletal: Negative. Gastrointestinal: Negative. Genitourinary: Negative. Neurological: Negative. Psychiatric/Behavioral: Negative. All other systems reviewed and are negative. Vitals:    08/23/23 0904   BP: 96/68   Pulse: 66   SpO2: 98%     Vitals:    08/23/23 0904   Weight: 97.4 kg (214 lb 11.2 oz)     Height: 5' 9" (175.3 cm)   Body mass index is 31.71 kg/m².     Physical Exam:  Vital signs reviewed  General:  Alert and cooperative, appears stated age, no acute distress  HEENT:  PERRLA, EOMI, no scleral icterus, no conjunctival pallor  Neck:  No lymphadenopathy, no thyromegaly, no carotid bruits, no elevated JVP  Heart:  Regular rate and rhythm, normal S1/S2, no S3/S4, no murmur, rubs or gallops. PMI nondisplaced  Lungs:  Clear to auscultation bilaterally, no wheezes rales or rhonchi  Abdomen:  Soft, non-tender, positive bowel sounds, no rebound or guarding,   no organomegaly   Extremities:  Normal range of motion.   No clubbing, cyanosis or edema   Vascular:  2+ pedal pulses  Skin:  No rashes or lesions on exposed skin  Neurologic:  Cranial nerves II-XII grossly intact without focal deficits  Psych:  Normal mood and affect

## 2023-09-06 ENCOUNTER — TELEPHONE (OUTPATIENT)
Dept: PAIN MEDICINE | Facility: CLINIC | Age: 53
End: 2023-09-06

## 2023-09-06 NOTE — TELEPHONE ENCOUNTER
Caller: Madeline     Doctor: Linnell Shone     Reason for call: they are requesting office and procedure notes from 8/4/2023 thru current     Call back#: [phone]    [facility] [phone]  Reference [id number]

## 2023-09-07 ENCOUNTER — OFFICE VISIT (OUTPATIENT)
Dept: NEUROSURGERY | Facility: CLINIC | Age: 53
End: 2023-09-07
Payer: COMMERCIAL

## 2023-09-07 VITALS
WEIGHT: 218 LBS | DIASTOLIC BLOOD PRESSURE: 58 MMHG | TEMPERATURE: 97.9 F | HEART RATE: 60 BPM | HEIGHT: 69 IN | BODY MASS INDEX: 32.29 KG/M2 | SYSTOLIC BLOOD PRESSURE: 118 MMHG

## 2023-09-07 DIAGNOSIS — Z98.1 ENCOUNTER FOR POSTOPERATIVE CARE RELATED TO SURGICAL JOINT FUSION: Primary | ICD-10-CM

## 2023-09-07 DIAGNOSIS — Z48.89 ENCOUNTER FOR POSTOPERATIVE CARE RELATED TO SURGICAL JOINT FUSION: Primary | ICD-10-CM

## 2023-09-07 PROCEDURE — 99213 OFFICE O/P EST LOW 20 MIN: CPT | Performed by: NEUROLOGICAL SURGERY
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
## 2023-09-07 NOTE — TELEPHONE ENCOUNTER
Records did not go through this AM when faxed. Called [employer] and confirmed fax # provided was correct. Records refaxed. Cheryl Martinez

## 2023-09-07 NOTE — TELEPHONE ENCOUNTER
The 7/11/23 ovs note and the 8/3/23 procedure note were faxed to [facility] to fax # provided in message.

## 2023-09-22 ENCOUNTER — TELEPHONE (OUTPATIENT)
Dept: CARDIOLOGY CLINIC | Facility: CLINIC | Age: 53
End: 2023-09-22

## 2023-09-22 NOTE — TELEPHONE ENCOUNTER
is Yu Valdovinos calling. So I faxed in some recordings that I did for myself on Blood pressure for Doctor Kerry Gee. Fax them in yesterday about 10 between 10:00 and 11:00. Just calling to confirm that you received them. If you have, please call my home [phone] and leave a message that you've received them. Please call [phone] and leave a message that you've received my recordings of my blood pressure for diabetic catita that were faxed in yesterday. Thank you. Have a good day. Bye. Called and left a message on vm to refax and fax number given. Advised I here unitil 430 p.

## 2023-09-27 ENCOUNTER — TELEPHONE (OUTPATIENT)
Dept: NEUROSURGERY | Facility: CLINIC | Age: 53
End: 2023-09-27

## 2023-09-27 NOTE — TELEPHONE ENCOUNTER
Received paper work from [facility]     According to [address] last office note patient is not being taken out of work or have any restrictions for work no surgical intervention is needed. Patient is to continue physical therapy and return to office on an as needed basis. Filled out paper work and printed last dko note.     Faxed to [facility]    If patient is unable to work he should follow up with physiatry

## 2023-10-04 ENCOUNTER — TELEPHONE (OUTPATIENT)
Dept: CARDIOLOGY CLINIC | Facility: CLINIC | Age: 53
End: 2023-10-04

## 2023-10-04 NOTE — TELEPHONE ENCOUNTER
Called and left a message of Dr Manual Began message. Asked pt to call office back to find out when scheduled with EP.

## 2023-10-04 NOTE — TELEPHONE ENCOUNTER
Please see scanned BP readings.      Carvedilol 25 mg bid  Losartan 25 mg qd    Please advise    Tried to call pt on cell unable to get in touch,   Left a message to call office on home machine

## 2023-10-31 ENCOUNTER — TELEPHONE (OUTPATIENT)
Dept: NEUROSURGERY | Facility: CLINIC | Age: 53
End: 2023-10-31

## 2023-10-31 NOTE — TELEPHONE ENCOUNTER
PT called for note to be taken out of work, notes refer pt to physiatry for out of work status. Pt will speak to physiatry  and get updated paperwork from them. PT will call back with further questions if needed.   Provided fax [phone]

## 2024-01-17 ENCOUNTER — TELEPHONE (OUTPATIENT)
Dept: NEUROSURGERY | Facility: CLINIC | Age: 54
End: 2024-01-17

## 2024-01-17 NOTE — TELEPHONE ENCOUNTER
1/17/24 - PT CALLED WITH NURSING QUESTIONS  - HE STATES HE LEFT MESSAGES LAST WEEK & HE HASN'T RECEIVED A CALL BACK - TRANSFERRED CALL TO NURSING LINE & INFORMED PT TO LEAVE A MESSAGE    & I LET HIM KNOW I WOULD FU TO SEE IF MESSAGE WAS RECEIVED PER HIS REQUEST - ROUTED MESSAGE TO CLINICAL TEAM

## 2024-01-18 ENCOUNTER — TELEPHONE (OUTPATIENT)
Dept: NEUROSURGERY | Facility: CLINIC | Age: 54
End: 2024-01-18

## 2024-01-18 NOTE — TELEPHONE ENCOUNTER
Mr. Gallegos phoned back after receiving April RN's message.  This RN emailed copy of paperwork to [email or web address] per patient request.  Mr. Gallegos to call and leave message letting team know he received the email.  Patient appreciative for assistance.

## 2024-01-18 NOTE — TELEPHONE ENCOUNTER
Please see telephone encounter from Kamala RIGGS and Rik RIGGS   Prescription of zolpidem (AMBIEN) 10 MG was faxed to pharmacy.

## 2024-01-18 NOTE — TELEPHONE ENCOUNTER
Received notification from front office staff that patient is seeking terminology from old medical records. This RN was able to locate old records from patient archives in which Dr. Bassett charts involuntary Movements (781.0). This RN attempted to contact patient, no answer, left very detailed voicemail encouraging call back.

## 2024-01-19 ENCOUNTER — TELEPHONE (OUTPATIENT)
Dept: NEUROSURGERY | Facility: CLINIC | Age: 54
End: 2024-01-19

## 2024-01-19 NOTE — TELEPHONE ENCOUNTER
Mr. Gallegos phoned to let us know that he received the email of documentation and wanted to thank us for our help.

## 2024-01-30 ENCOUNTER — TELEPHONE (OUTPATIENT)
Age: 54
End: 2024-01-30

## 2024-01-30 NOTE — TELEPHONE ENCOUNTER
Caller: patient    Doctor: MISSY    Reason for call: patient would like to schedule another procedure.   Back and leg pain  Stated would like a call on both of the number he has in the chart      Call back#:

## 2024-02-01 NOTE — TELEPHONE ENCOUNTER
Caller: jeramie Holder    Doctor: Sara    Reason for call: pt looking to schedule an injection     Call back#: call transferred to the

## 2024-02-01 NOTE — TELEPHONE ENCOUNTER
Patient scheduled for procedure with , 2/22/24.  Patient does not have mychart, so the following instructions were given to patient verbally, no vaccines 14 days prior or after procedure, nothing to eat or drink 1 hr prior to procedure, wear something loose and comfortable, no jewelry, if ill, infection or antibiotics, must call office to reschedule procedure.  Take prescription medications as normal and you will need a  18 yrs or older.  Patient denies taking any blood thinners, Aspirin or prescription.

## 2024-02-08 ENCOUNTER — HOSPITAL ENCOUNTER (OUTPATIENT)
Dept: RADIOLOGY | Facility: CLINIC | Age: 54
End: 2024-02-08
Payer: COMMERCIAL

## 2024-02-08 VITALS
RESPIRATION RATE: 20 BRPM | OXYGEN SATURATION: 96 % | DIASTOLIC BLOOD PRESSURE: 63 MMHG | SYSTOLIC BLOOD PRESSURE: 101 MMHG | HEART RATE: 64 BPM | TEMPERATURE: 97.2 F

## 2024-02-08 DIAGNOSIS — M54.16 LUMBAR RADICULOPATHY: ICD-10-CM

## 2024-02-08 PROCEDURE — 62323 NJX INTERLAMINAR LMBR/SAC: CPT | Performed by: ANESTHESIOLOGY

## 2024-02-08 RX ORDER — METHYLPREDNISOLONE ACETATE 80 MG/ML
80 INJECTION, SUSPENSION INTRA-ARTICULAR; INTRALESIONAL; INTRAMUSCULAR; PARENTERAL; SOFT TISSUE ONCE
Status: COMPLETED | OUTPATIENT
Start: 2024-02-08 | End: 2024-02-08

## 2024-02-08 RX ORDER — BUPIVACAINE HCL/PF 2.5 MG/ML
2 VIAL (ML) INJECTION ONCE
Status: COMPLETED | OUTPATIENT
Start: 2024-02-08 | End: 2024-02-08

## 2024-02-08 RX ADMIN — BUPIVACAINE HYDROCHLORIDE 2 ML: 2.5 INJECTION, SOLUTION EPIDURAL; INFILTRATION; INTRACAUDAL at 13:46

## 2024-02-08 RX ADMIN — METHYLPREDNISOLONE ACETATE 80 MG: 80 INJECTION, SUSPENSION INTRA-ARTICULAR; INTRALESIONAL; INTRAMUSCULAR; PARENTERAL; SOFT TISSUE at 13:46

## 2024-02-08 RX ADMIN — IOHEXOL 1 ML: 300 INJECTION, SOLUTION INTRAVENOUS at 13:46
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
## 2024-02-08 NOTE — DISCHARGE INSTR - LAB
Epidural Steroid Injection   WHAT YOU NEED TO KNOW:   An epidural steroid injection (MARGARETH) is a procedure to inject steroid medicine into the epidural space. The epidural space is between your spinal cord and vertebrae. Steroids reduce inflammation and fluid buildup in your spine that may be causing pain. You may be given pain medicine along with the steroids.          ACTIVITY  Do not drive or operate machinery today.  No strenuous activity today - bending, lifting, etc.  You may resume normal activites starting tomorrow - start slowly and as tolerated.  You may shower today, but no tub baths or hot tubs.  You may have numbness for several hours from the local anesthetic. Please use caution and common sense, especially with weight-bearing activities.    CARE OF THE INJECTION SITE  If you have soreness or pain, apply ice to the area today (20 minutes on/20 minutes off).  Starting tomorrow, you may use warm, moist heat or ice if needed.  You may have an increase or change in your discomfort for 36-48 hours after your treatment.  Apply ice and continue with any pain medication you have been prescribed.  Notify the [facility] if you have any of the following: redness, drainage, swelling, headache, stiff neck or fever above 100°F.    SPECIAL INSTRUCTIONS  Our office will contact you in approximately 7 days for a progress report.    MEDICATIONS  Continue to take all routine medications.  Our office may have instructed you to hold some medications.    As no general anesthesia was used in today's procedure, you should not experience any side effects related to anesthesia.     If you are diabetic, the steroids used in today's injection may temporarily increase your blood sugar levels after the first few days after your injection. Please keep a close eye on your sugars and alert the doctor who manages your diabetes if your sugars are significantly high from your baseline or you are symptomatic.     If you have a  problem specifically related to your procedure, please call our office at [phone].  Problems not related to your procedure should be directed to your primary care physician.

## 2024-02-09 ENCOUNTER — TELEPHONE (OUTPATIENT)
Dept: NEUROSURGERY | Facility: CLINIC | Age: 54
End: 2024-02-09

## 2024-02-09 NOTE — TELEPHONE ENCOUNTER
Pt is looking to you for recommendation for colonoscopy and endoscopy, pt state he has seen you in the past and would like your opinion.  He can be reached at         Called pt 2/12 and gave pt Dr Mars phone number

## 2024-02-15 ENCOUNTER — TELEPHONE (OUTPATIENT)
Dept: RADIOLOGY | Facility: MEDICAL CENTER | Age: 54
End: 2024-02-15

## 2024-02-23 NOTE — TELEPHONE ENCOUNTER
Pt said the shot helped a little for the first week.  Current symptoms are HA, he had to go lay down today b/c of his HA.  He has increased pain of LB and hips and he is not even doing much of anything. If he tries to do things he has to take tons of brakes. The LB pain is worse than the hip pain.  No pain in the buttocks or legs but he has pain in his feet.  His shoulders are tight.  His Rt leg has given out this week as recently as today.  If he is doing nothing at all his pain is 3/10 and increases to at least 5/10 when he tries to do more.   Pls review and advise.

## 2024-02-23 NOTE — TELEPHONE ENCOUNTER
Caller: jeramie    Doctor: bushra    Reason for call: no improvement. Pt wants to know if he had the same injection as in August?    Call back#: [phone]

## 2024-03-05 ENCOUNTER — OFFICE VISIT (OUTPATIENT)
Dept: CARDIOLOGY CLINIC | Facility: CLINIC | Age: 54
End: 2024-03-05
Payer: MEDICARE

## 2024-03-05 VITALS
DIASTOLIC BLOOD PRESSURE: 70 MMHG | HEIGHT: 69 IN | SYSTOLIC BLOOD PRESSURE: 98 MMHG | HEART RATE: 55 BPM | OXYGEN SATURATION: 96 % | BODY MASS INDEX: 32.58 KG/M2 | WEIGHT: 220 LBS

## 2024-03-05 DIAGNOSIS — I49.3 PREMATURE VENTRICULAR CONTRACTION: ICD-10-CM

## 2024-03-05 DIAGNOSIS — E78.2 MIXED HYPERLIPIDEMIA: ICD-10-CM

## 2024-03-05 DIAGNOSIS — I42.9 CARDIOMYOPATHY, UNSPECIFIED TYPE (HCC): Primary | ICD-10-CM

## 2024-03-05 DIAGNOSIS — M46.1 SACROILIITIS (HCC): ICD-10-CM

## 2024-03-05 PROCEDURE — 99214 OFFICE O/P EST MOD 30 MIN: CPT | Performed by: INTERNAL MEDICINE

## 2024-03-05 RX ORDER — HYDROCHLOROTHIAZIDE 12.5 MG/1
12.5 TABLET ORAL AS NEEDED
COMMUNITY
Start: 2024-02-16
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
## 2024-03-11 ENCOUNTER — OFFICE VISIT (OUTPATIENT)
Dept: PAIN MEDICINE | Facility: CLINIC | Age: 54
End: 2024-03-11
Payer: COMMERCIAL

## 2024-03-11 VITALS
DIASTOLIC BLOOD PRESSURE: 90 MMHG | HEART RATE: 80 BPM | HEIGHT: 69 IN | WEIGHT: 212 LBS | SYSTOLIC BLOOD PRESSURE: 100 MMHG | BODY MASS INDEX: 31.4 KG/M2

## 2024-03-11 DIAGNOSIS — M47.816 LUMBAR SPONDYLOSIS: ICD-10-CM

## 2024-03-11 DIAGNOSIS — M51.16 INTERVERTEBRAL DISC DISORDER WITH RADICULOPATHY OF LUMBAR REGION: Primary | ICD-10-CM

## 2024-03-11 PROCEDURE — 99214 OFFICE O/P EST MOD 30 MIN: CPT | Performed by: ANESTHESIOLOGY
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
## 2024-03-21 ENCOUNTER — EVALUATION (OUTPATIENT)
Dept: PHYSICAL THERAPY | Facility: CLINIC | Age: 54
End: 2024-03-21
Payer: COMMERCIAL

## 2024-03-21 DIAGNOSIS — M47.816 LUMBAR SPONDYLOSIS: ICD-10-CM

## 2024-03-21 DIAGNOSIS — M51.16 INTERVERTEBRAL DISC DISORDER WITH RADICULOPATHY OF LUMBAR REGION: Primary | ICD-10-CM

## 2024-03-21 PROCEDURE — 97163 PT EVAL HIGH COMPLEX 45 MIN: CPT | Performed by: PHYSICAL THERAPIST

## 2024-03-27 ENCOUNTER — EVALUATION (OUTPATIENT)
Dept: PHYSICAL THERAPY | Facility: CLINIC | Age: 54
End: 2024-03-27
Payer: COMMERCIAL

## 2024-03-27 DIAGNOSIS — M47.816 LUMBAR SPONDYLOSIS: ICD-10-CM

## 2024-03-27 DIAGNOSIS — M51.16 INTERVERTEBRAL DISC DISORDER WITH RADICULOPATHY OF LUMBAR REGION: Primary | ICD-10-CM

## 2024-03-27 PROCEDURE — 97140 MANUAL THERAPY 1/> REGIONS: CPT | Performed by: PHYSICAL THERAPIST

## 2024-03-27 PROCEDURE — 97112 NEUROMUSCULAR REEDUCATION: CPT | Performed by: PHYSICAL THERAPIST

## 2024-03-27 PROCEDURE — 97110 THERAPEUTIC EXERCISES: CPT | Performed by: PHYSICAL THERAPIST
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
## 2024-03-29 ENCOUNTER — OFFICE VISIT (OUTPATIENT)
Dept: PHYSICAL THERAPY | Facility: CLINIC | Age: 54
End: 2024-03-29
Payer: COMMERCIAL

## 2024-03-29 DIAGNOSIS — M51.16 INTERVERTEBRAL DISC DISORDER WITH RADICULOPATHY OF LUMBAR REGION: Primary | ICD-10-CM

## 2024-03-29 DIAGNOSIS — M47.816 LUMBAR SPONDYLOSIS: ICD-10-CM

## 2024-03-29 PROCEDURE — 97140 MANUAL THERAPY 1/> REGIONS: CPT | Performed by: PHYSICAL THERAPIST

## 2024-03-29 PROCEDURE — 97110 THERAPEUTIC EXERCISES: CPT | Performed by: PHYSICAL THERAPIST

## 2024-03-29 PROCEDURE — 97112 NEUROMUSCULAR REEDUCATION: CPT | Performed by: PHYSICAL THERAPIST

## 2024-03-29 NOTE — PROGRESS NOTES
"Daily Note     Today's date: 3/29/2024  Patient name: Gallo Gallegos  : 1970  MRN: [id number]  Referring provider: Alexi Ruiz MD  Dx:   Encounter Diagnosis     ICD-10-CM    1. Intervertebral disc disorder with radiculopathy of lumbar region  M51.16       2. Lumbar spondylosis  M47.816                      Subjective: Pt denies any new complaints regarding treatment earlier this week. Pt reports having a headache       Objective: See treatment diary below      Assessment: Tolerated treatment well. Continued to work on primary movement impairments. Patient exhibited good technique with therapeutic exercises and would benefit from continued PT.      Plan: Continue per plan of care.      Precautions: H/O Spinal cord injury, Cardiomyopathy, Lumbar radiculopathy, CHF, Cervical fusion, Cardiac surgery    Manuals 3/27 3/29           CPA of Lumbar spine Gr II+III  Gr II+III           R hip extension  Nv                                       Neuro Re-Ed             Bridge  10x           TrA            TrA Clamshell 10x rtb  15x on each with TrA           TrA kickouts             L sciatic nerve glides 10x3\"            R femoral nerve glide                           Ther Ex             Elliptical  5' Lvl 6-14           Piriformis stretch              RE_EVALUATION performed            Press up 10x 5\"  10x5\"            LTR 2' 2'           R hip flexor stretch  7x10\" R                                     Ther Activity                                       Gait Training                                       Modalities                                       1:1 with PT from 817-9a     "

## 2024-04-01 ENCOUNTER — OFFICE VISIT (OUTPATIENT)
Dept: PHYSICAL THERAPY | Facility: CLINIC | Age: 54
End: 2024-04-01
Payer: COMMERCIAL

## 2024-04-01 DIAGNOSIS — M47.816 LUMBAR SPONDYLOSIS: ICD-10-CM

## 2024-04-01 DIAGNOSIS — M51.16 INTERVERTEBRAL DISC DISORDER WITH RADICULOPATHY OF LUMBAR REGION: Primary | ICD-10-CM

## 2024-04-01 PROCEDURE — 97110 THERAPEUTIC EXERCISES: CPT | Performed by: PHYSICAL THERAPIST

## 2024-04-01 PROCEDURE — 97112 NEUROMUSCULAR REEDUCATION: CPT | Performed by: PHYSICAL THERAPIST

## 2024-04-01 PROCEDURE — 97140 MANUAL THERAPY 1/> REGIONS: CPT | Performed by: PHYSICAL THERAPIST

## 2024-04-01 NOTE — PROGRESS NOTES
"Daily Note     Today's date: 2024  Patient name: Gallo Gallegos  : 1970  MRN: [id number]  Referring provider: Alexi Ruiz MD  Dx:   Encounter Diagnosis     ICD-10-CM    1. Intervertebral disc disorder with radiculopathy of lumbar region  M51.16       2. Lumbar spondylosis  M47.816           Start Time: 1415  Stop Time: 1515  Total time in clinic (min): 60 minutes    Subjective: Pt attends PT walking with a SPC today. He states that his pain level was really bad on Saturday, on the right side of his lumbar spine. Attributes this to current PT regiment,  secondary to not doing anything else after his session. He notes pain with even taking a breath. Had to use heating pad all weekend. Symptoms gradually feeling better but not back to normal.        Objective: See treatment diary below      Assessment: Decreased exercises today and modified plan of care. Utilized lower level laser to help reduce tenderness in right lumbar spine. Continued with gentle graded lumbar spine mobilizations and R UPA. Performed light exercises without adverse effects. Tolerated treatment well. Will resume exercises focusing on primary movement impairments as tolerated. Patient would benefit from continued PT.      Plan: Continue per plan of care.      Precautions: H/O Spinal cord injury, Cardiomyopathy, Lumbar radiculopathy, CHF, Cervical fusion, Cardiac surgery    Manuals 3/27 3/29 4/1            CPA of Lumbar spine Gr II+III  Gr II+III Gr II (focus on R UPA)          R hip extension  Nv             IASTM using LLL   22 Ws 4' R paraspinals                       Neuro Re-Ed             Bridge  10x           TrA '           TrA Clamshell 10x rtb  15x on each with TrA Hip abd isometric 10x 5:          TrA kickouts             L sciatic nerve glides 10x3\"            R femoral nerve glide              TrA hold   20x5\"           Ther Ex             Elliptical  5' Lvl 6-14 5' Lvl 6-15          Piriformis stretch            " "  RE_EVALUATION performed            Press up 10x 5\"  10x5\"  Against wall 10x 5\" standing          LTR 2' 2' 10x B          R hip flexor stretch  7x10\" R                                     Ther Activity                                       Gait Training                                       Modalities                Seated x10'                       1:1 with PT from 221-3pm       "

## 2024-04-08 ENCOUNTER — OFFICE VISIT (OUTPATIENT)
Dept: PHYSICAL THERAPY | Facility: CLINIC | Age: 54
End: 2024-04-08
Payer: COMMERCIAL

## 2024-04-08 DIAGNOSIS — M47.816 LUMBAR SPONDYLOSIS: ICD-10-CM

## 2024-04-08 DIAGNOSIS — M51.16 INTERVERTEBRAL DISC DISORDER WITH RADICULOPATHY OF LUMBAR REGION: Primary | ICD-10-CM

## 2024-04-08 PROCEDURE — 97112 NEUROMUSCULAR REEDUCATION: CPT | Performed by: PHYSICAL THERAPIST

## 2024-04-08 PROCEDURE — 97110 THERAPEUTIC EXERCISES: CPT | Performed by: PHYSICAL THERAPIST

## 2024-04-08 PROCEDURE — 97140 MANUAL THERAPY 1/> REGIONS: CPT | Performed by: PHYSICAL THERAPIST

## 2024-04-08 NOTE — PROGRESS NOTES
"Daily Note     Today's date: 2024  Patient name: Gallo Gallegos  : 1970  MRN: [id number]  Referring provider: Alexi Ruiz MD  Dx:   Encounter Diagnosis     ICD-10-CM    1. Intervertebral disc disorder with radiculopathy of lumbar region  M51.16       2. Lumbar spondylosis  M47.816                      Subjective: Pt reports feeling back to baseline on Thursday last week.       Objective: See treatment diary below      Assessment: Discussed expectations on progression and tolerance. Working toward all improving all primary movement impairments; however, slow progress is necessary due to the pain exacerbation after 1st treatment session. Tolerated treatment well with current outlined program. Utilized MH post exercises.  Patient would benefit from continued PT.      Plan: Continue per plan of care.      Precautions: H/O Spinal cord injury, Cardiomyopathy, Lumbar radiculopathy, CHF, Cervical fusion, Cardiac surgery    Manuals 3/27 3/29 4/1   4/8         CPA of Lumbar spine Gr II+III  Gr II+III Gr II (focus on R UPA) Gr II (focus on R UPA)         R hip extension  Nv             IASTM using LLL   22 Ws 4' R paraspinals 22 Ws 4' R paraspinals                      Neuro Re-Ed             Bridge  10x           TrA '           TrA Clamshell 10x rtb  15x on each with TrA Hip abd isometric 10x 5\" Hip abd isometric 10x 5\"         TrA kickouts             L sciatic nerve glides 10x3\"            R femoral nerve glide              TrA hold   20x5\"  30x5\"         Ther Ex             Elliptical  5' Lvl 6-14 5' Lvl 6-15 5' Lvl 6-15         Piriformis stretch              RE_EVALUATION performed            Press up 10x 5\"  10x5\"  Against wall 10x 5\" standing Against wall 7x 5\" standing         LTR 2' 2' 10x B 10x B         R hip flexor stretch  7x10\" R                                     Ther Activity                                       Gait Training                                       Modalities           "      Seated x10' Seated x10'                      1:1 with PT from 1119-12pm

## 2024-04-11 ENCOUNTER — OFFICE VISIT (OUTPATIENT)
Dept: PHYSICAL THERAPY | Facility: CLINIC | Age: 54
End: 2024-04-11
Payer: COMMERCIAL

## 2024-04-11 DIAGNOSIS — M51.16 INTERVERTEBRAL DISC DISORDER WITH RADICULOPATHY OF LUMBAR REGION: Primary | ICD-10-CM

## 2024-04-11 DIAGNOSIS — M47.816 LUMBAR SPONDYLOSIS: ICD-10-CM

## 2024-04-11 PROCEDURE — 97112 NEUROMUSCULAR REEDUCATION: CPT | Performed by: PHYSICAL THERAPIST

## 2024-04-11 PROCEDURE — 97140 MANUAL THERAPY 1/> REGIONS: CPT | Performed by: PHYSICAL THERAPIST

## 2024-04-11 NOTE — PROGRESS NOTES
"Daily Note     Today's date: 2024  Patient name: Gallo Gallegos  : 1970  MRN: [id number]  Referring provider: Alexi Ruiz MD  Dx:   Encounter Diagnosis     ICD-10-CM    1. Intervertebral disc disorder with radiculopathy of lumbar region  M51.16       2. Lumbar spondylosis  M47.816           Start Time: 1018  Stop Time: 1110  Total time in clinic (min): 52 minutes    Subjective: Pt overall doing well. No increase of symptoms from last session.       Objective: See treatment diary below      Assessment: Re-integrated marches with TrA into POC. Tolerated treatment well. Pt able to complete 10 reps for lumbar spine extension against table. Tolerance slowly improving. Perform CPA of lumbar spine vs R UPA secondary to improved tenderness and sensitization. Pt very knowledgable about cryotherapy. Recommend him try this at home. Patient exhibited good technique with therapeutic exercises and would benefit from continued PT      Plan: Continue per plan of care.      Precautions: H/O Spinal cord injury, Cardiomyopathy, Lumbar radiculopathy, CHF, Cervical fusion, Cardiac surgery    Manuals 3/27 3/29 4/1   4/8 4/11        CPA of Lumbar spine Gr II+III  Gr II+III Gr II (focus on R UPA) Gr II (focus on R UPA) Gr II CPA        R hip extension  Nv             IASTM using LLL   22 Ws 4' R paraspinals 22 Ws 4' R paraspinals 22 Ws 4' R paraspinals                     Neuro Re-Ed             Bridge  10x           TrA March  2'           TrA Clamshell 10x rtb  15x on each with TrA Hip abd isometric 10x 5\" Hip abd isometric 10x 5\" Hip abd isometric 10x 5\"        TrA kickouts             L sciatic nerve glides 10x3\"            R femoral nerve glide              TrA hold   20x5\"  30x5\" 30x5\"        Ther Ex             Elliptical  5' Lvl 6-14 5' Lvl 6-15 5' Lvl 6-15 5' Lvl 6-15        Piriformis stretch              RE_EVALUATION performed            Press up 10x 5\"  10x5\"  Against wall 10x 5\" standing Against wall 7x 5\" " "standing Against wall 10x 5\" standing        LTR 2' 2' 10x B 10x B 10x B        R hip flexor stretch  7x10\" R                                     Ther Activity                                       Gait Training                                       Modalities             MH   Seated x10' Seated x10' Seated x10'                     1:1 with PT from [id number]           "

## 2024-04-15 ENCOUNTER — TELEPHONE (OUTPATIENT)
Age: 54
End: 2024-04-15

## 2024-04-15 ENCOUNTER — OFFICE VISIT (OUTPATIENT)
Dept: PHYSICAL THERAPY | Facility: CLINIC | Age: 54
End: 2024-04-15
Payer: COMMERCIAL

## 2024-04-15 DIAGNOSIS — M47.816 LUMBAR SPONDYLOSIS: ICD-10-CM

## 2024-04-15 DIAGNOSIS — M51.16 INTERVERTEBRAL DISC DISORDER WITH RADICULOPATHY OF LUMBAR REGION: Primary | ICD-10-CM

## 2024-04-15 PROCEDURE — 97112 NEUROMUSCULAR REEDUCATION: CPT | Performed by: PHYSICAL THERAPIST

## 2024-04-15 PROCEDURE — 97140 MANUAL THERAPY 1/> REGIONS: CPT | Performed by: PHYSICAL THERAPIST

## 2024-04-15 PROCEDURE — 97110 THERAPEUTIC EXERCISES: CPT | Performed by: PHYSICAL THERAPIST

## 2024-04-15 NOTE — PROGRESS NOTES
"Daily Note     Today's date: 4/15/2024  Patient name: Gallo Gallegos  : 1970  MRN: [id number]  Referring provider: Alexi Ruiz MD  Dx:   Encounter Diagnosis     ICD-10-CM    1. Intervertebral disc disorder with radiculopathy of lumbar region  M51.16       2. Lumbar spondylosis  M47.816                      Subjective: Pt reports feeling paralyzing pain after sitting for too long.       Objective: See treatment diary below      Assessment: Spent most of today's session educating and consulting about his chronic pain levels. Discussed pain neuroscience education and the nervous system's role in processing. Utilized house alarm strategy to discuss his level of nerve sensitization. Related this to his \"locking\" feeling in his back with poor sitting tolerance. Encouraged graded movement and exercise as the mainstay of our treatment.  Tolerated treatment well. Patient would benefit from continued PT.       Plan: Continue per plan of care.      Precautions: H/O Spinal cord injury, Cardiomyopathy, Lumbar radiculopathy, CHF, Cervical fusion, Cardiac surgery    Manuals 3/27 3/29 4/1   4/8 4/11 4/15       CPA of Lumbar spine Gr II+III  Gr II+III Gr II (focus on R UPA) Gr II (focus on R UPA) Gr II CPA        R hip extension  Nv             IASTM using LLL   22 Ws 4' R paraspinals 22 Ws 4' R paraspinals 22 Ws 4' R paraspinals 22 Ws 5' R paraspinals                    Neuro Re-Ed             Bridge  10x           TrA            TrA Clamshell 10x rtb  15x on each with TrA Hip abd isometric 10x 5\" Hip abd isometric 10x 5\" Hip abd isometric 10x 5\"        TrA kickouts             L sciatic nerve glides 10x3\"            R femoral nerve glide              TrA hold   20x5\"  30x5\" 30x5\" 30x5\"       Ther Ex             Elliptical  5' Lvl 6-14 5' Lvl 6-15 5' Lvl 6-15 5' Lvl 6-15 5' Lvl 6-15       Piriformis stretch              RE_EVALUATION performed            Press up 10x 5\"  10x5\"  Against wall 10x 5\" standing Against " "wall 7x 5\" standing Against wall 10x 5\" standing Against [id number] 5\" standing       LTR 2' 2' 10x B 10x B 10x B 10x B       R hip flexor stretch  7x10\" R                                     Ther Activity                                       Education      15'                                  Modalities             MH   Seated x10' Seated x10' Seated x10'                     1:1 with PT from 1115-12pm             "

## 2024-04-19 ENCOUNTER — TELEPHONE (OUTPATIENT)
Dept: GASTROENTEROLOGY | Facility: MEDICAL CENTER | Age: 54
End: 2024-04-19

## 2024-04-19 ENCOUNTER — OFFICE VISIT (OUTPATIENT)
Dept: PHYSICAL THERAPY | Facility: CLINIC | Age: 54
End: 2024-04-19
Payer: COMMERCIAL

## 2024-04-19 ENCOUNTER — CONSULT (OUTPATIENT)
Dept: GASTROENTEROLOGY | Facility: MEDICAL CENTER | Age: 54
End: 2024-04-19
Payer: COMMERCIAL

## 2024-04-19 VITALS
TEMPERATURE: 97.9 F | SYSTOLIC BLOOD PRESSURE: 116 MMHG | HEART RATE: 64 BPM | BODY MASS INDEX: 32.25 KG/M2 | WEIGHT: 218.4 LBS | DIASTOLIC BLOOD PRESSURE: 70 MMHG

## 2024-04-19 DIAGNOSIS — Z80.0 FAMILY HISTORY OF ESOPHAGEAL CANCER: ICD-10-CM

## 2024-04-19 DIAGNOSIS — Z86.010 HISTORY OF COLON POLYPS: ICD-10-CM

## 2024-04-19 DIAGNOSIS — D69.6 PLATELETS DECREASED (HCC): ICD-10-CM

## 2024-04-19 DIAGNOSIS — M51.16 INTERVERTEBRAL DISC DISORDER WITH RADICULOPATHY OF LUMBAR REGION: Primary | ICD-10-CM

## 2024-04-19 DIAGNOSIS — M47.816 LUMBAR SPONDYLOSIS: ICD-10-CM

## 2024-04-19 DIAGNOSIS — B33.24 VIRAL CARDIOMYOPATHY (HCC): Primary | ICD-10-CM

## 2024-04-19 PROBLEM — Z86.0100 HISTORY OF COLON POLYPS: Status: ACTIVE | Noted: 2024-04-19

## 2024-04-19 PROCEDURE — 97140 MANUAL THERAPY 1/> REGIONS: CPT | Performed by: PHYSICAL THERAPIST

## 2024-04-19 PROCEDURE — 99244 OFF/OP CNSLTJ NEW/EST MOD 40: CPT | Performed by: INTERNAL MEDICINE

## 2024-04-19 PROCEDURE — 97112 NEUROMUSCULAR REEDUCATION: CPT | Performed by: PHYSICAL THERAPIST

## 2024-04-19 PROCEDURE — 97110 THERAPEUTIC EXERCISES: CPT | Performed by: PHYSICAL THERAPIST
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
## 2024-04-19 NOTE — PROGRESS NOTES
"Daily Note     Today's date: 2024  Patient name: Gallo Gallegos  : 1970  MRN: [id number]  Referring provider: Alexi Ruiz MD  Dx:   Encounter Diagnosis     ICD-10-CM    1. Intervertebral disc disorder with radiculopathy of lumbar region  M51.16       2. Lumbar spondylosis  M47.816           Start Time: 0815  Stop Time: 0900  Total time in clinic (min): 45 minutes    Subjective: Pt reports feeling tight in his upper back this morning.       Objective: See treatment diary below      Assessment: Tolerated treatment well. No adverse effects with lumbar  or prescribed exercises. Resumed marching with TrA contraction. Patient would benefit from continued PT      Plan: Continue per plan of care.      Precautions: H/O Spinal cord injury, Cardiomyopathy, Lumbar radiculopathy, CHF, Cervical fusion, Cardiac surgery    Manuals 3/27 3/29 4/1   4/8 4/11 4/15 4/19      CPA of Lumbar spine Gr II+III  Gr II+III Gr II (focus on R UPA) Gr II (focus on R UPA) Gr II CPA  Gr II CPA      R hip extension  Nv             IASTM using LLL   22 Ws 4' R paraspinals 22 Ws 4' R paraspinals 22 Ws 4' R paraspinals 22 Ws 5' R paraspinals 22 Ws 5' R paraspinals                   Neuro Re-Ed             Bridge  10x           TrA      10x on ea      TrA Clamshell 10x rtb  15x on each with TrA Hip abd isometric 10x 5\" Hip abd isometric 10x 5\" Hip abd isometric 10x 5\"  Hip abd isometric 10x 5\"      TrA kickouts             L sciatic nerve glides 10x3\"            R femoral nerve glide              TrA hold   20x5\"  30x5\" 30x5\" 30x5\" 20x5\"      Ther Ex             Elliptical  5' Lvl 6-14 5' Lvl 6-15 5' Lvl 6-15 5' Lvl 6-15 5' Lvl 6-15 5' Lvl 6-15      Piriformis stretch              RE_EVALUATION performed            Press up 10x 5\"  10x5\"  Against wall 10x 5\" standing Against wall 7x 5\" standing Against wall 10x 5\" standing Against [id number] 5\" standing Against [id number] 5\" standing      LTR 2' 2' 10x B 10x B 10x B 10x B 10x B    " "  R hip flexor stretch  7x10\" R                                     Ther Activity                                       Education      15'                                  Modalities             MH   Seated x10' Seated x10' Seated x10'  Seated x10'                   1:1 with PT from 815-9a               "

## 2024-04-19 NOTE — TELEPHONE ENCOUNTER
Procedure: COLON/EGD  Date: 07/18/2024  Physician performing: Dr. Mars  Location of procedure:  07/18/2024  Instructions given to patient: Golytely  Diabetic: N/A  Clearances: N/A

## 2024-04-22 ENCOUNTER — OFFICE VISIT (OUTPATIENT)
Dept: PHYSICAL THERAPY | Facility: CLINIC | Age: 54
End: 2024-04-22
Payer: COMMERCIAL

## 2024-04-22 DIAGNOSIS — M47.816 LUMBAR SPONDYLOSIS: ICD-10-CM

## 2024-04-22 DIAGNOSIS — M51.16 INTERVERTEBRAL DISC DISORDER WITH RADICULOPATHY OF LUMBAR REGION: Primary | ICD-10-CM

## 2024-04-22 PROCEDURE — 97110 THERAPEUTIC EXERCISES: CPT | Performed by: PHYSICAL THERAPIST

## 2024-04-22 PROCEDURE — 97140 MANUAL THERAPY 1/> REGIONS: CPT | Performed by: PHYSICAL THERAPIST

## 2024-04-22 PROCEDURE — 97112 NEUROMUSCULAR REEDUCATION: CPT | Performed by: PHYSICAL THERAPIST

## 2024-04-22 NOTE — PROGRESS NOTES
"Daily Note     Today's date: 2024  Patient name: Gallo Gallegos  : 1970  MRN: [id number]  Referring provider: Alexi Ruiz MD  Dx:   Encounter Diagnosis     ICD-10-CM    1. Intervertebral disc disorder with radiculopathy of lumbar region  M51.16       2. Lumbar spondylosis  M47.816                      Subjective: Pt offers no new complaints this morning.       Objective: See treatment diary below      Assessment: Tolerated treatment well. Resumed TB with clam shells in hooklying position. Initiated LE kick outs with core strengthening and stabilization. No adverse effects of exercises while performing them. Patient exhibited good technique with therapeutic exercises and would benefit from continued PT.      Plan: Continue per plan of care.      Precautions: H/O Spinal cord injury, Cardiomyopathy, Lumbar radiculopathy, CHF, Cervical fusion, Cardiac surgery    Manuals 3/27 3/29 4/1   4/8 4/11 4/15 4/19 4/22     CPA of Lumbar spine Gr II+III  Gr II+III Gr II (focus on R UPA) Gr II (focus on R UPA) Gr II CPA  Gr II CPA Gr II CPA     R hip extension  Nv             IASTM using LLL   22 Ws 4' R paraspinals 22 Ws 4' R paraspinals 22 Ws 4' R paraspinals 22 Ws 5' R paraspinals 22 Ws 5' R paraspinals 22 Ws 5' R paraspinals                  Neuro Re-Ed             Bridge  10x           TrA      10x on ea 10x on ea     TrA Clamshell 10x rtb  15x on each with TrA Hip abd isometric 10x 5\" Hip abd isometric 10x 5\" Hip abd isometric 10x 5\"  Hip abd isometric 10x 5\" With rtb 10x hooklying     TrA kickouts        10x on ea     L sciatic nerve glides 10x3\"            R femoral nerve glide              TrA hold   20x5\"  30x5\" 30x5\" 30x5\" 20x5\"      Ther Ex             Elliptical  5' Lvl 6-14 5' Lvl 6-15 5' Lvl 6-15 5' Lvl 6-15 5' Lvl 6-15 5' Lvl 6-15 5' Lvl 6-15     Piriformis stretch              RE_EVALUATION performed            Press up 10x 5\"  10x5\"  Against wall 10x 5\" standing Against wall 7x 5\" standing " "Against wall 10x 5\" standing Against [id number] 5\" standing Against [id number] 5\" standing Against [email or web address] 5\" standing     LTR 2' 2' 10x B 10x B 10x B 10x B 10x B 10x B     R hip flexor stretch  7x10\" R                                     Ther Activity                                       Education      15'                                  Modalities             MH   Seated x10' Seated x10' Seated x10'  Seated x10' Seated x10'                  1:1 with PT from [id number]                 "

## 2024-04-23 ENCOUNTER — TELEPHONE (OUTPATIENT)
Age: 54
End: 2024-04-23

## 2024-04-25 ENCOUNTER — EVALUATION (OUTPATIENT)
Dept: PHYSICAL THERAPY | Facility: CLINIC | Age: 54
End: 2024-04-25
Payer: COMMERCIAL

## 2024-04-25 DIAGNOSIS — M47.816 LUMBAR SPONDYLOSIS: Primary | ICD-10-CM

## 2024-04-25 DIAGNOSIS — M51.16 INTERVERTEBRAL DISC DISORDER WITH RADICULOPATHY OF LUMBAR REGION: ICD-10-CM

## 2024-04-25 PROCEDURE — 97110 THERAPEUTIC EXERCISES: CPT | Performed by: PHYSICAL THERAPIST

## 2024-04-25 PROCEDURE — 97530 THERAPEUTIC ACTIVITIES: CPT | Performed by: PHYSICAL THERAPIST

## 2024-04-25 PROCEDURE — 97112 NEUROMUSCULAR REEDUCATION: CPT | Performed by: PHYSICAL THERAPIST
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
## 2024-04-29 ENCOUNTER — OFFICE VISIT (OUTPATIENT)
Dept: PHYSICAL THERAPY | Facility: CLINIC | Age: 54
End: 2024-04-29
Payer: COMMERCIAL

## 2024-04-29 DIAGNOSIS — M47.816 LUMBAR SPONDYLOSIS: Primary | ICD-10-CM

## 2024-04-29 DIAGNOSIS — M51.16 INTERVERTEBRAL DISC DISORDER WITH RADICULOPATHY OF LUMBAR REGION: ICD-10-CM

## 2024-04-29 PROCEDURE — 97140 MANUAL THERAPY 1/> REGIONS: CPT | Performed by: PHYSICAL THERAPIST

## 2024-04-29 PROCEDURE — 97112 NEUROMUSCULAR REEDUCATION: CPT | Performed by: PHYSICAL THERAPIST

## 2024-04-29 PROCEDURE — 97110 THERAPEUTIC EXERCISES: CPT | Performed by: PHYSICAL THERAPIST

## 2024-04-29 NOTE — PROGRESS NOTES
"Daily Note     Today's date: 2024  Patient name: Gallo Gallegos  : 1970  MRN: [id number]  Referring provider: Alexi Ruiz MD  Dx:   Encounter Diagnosis     ICD-10-CM    1. Lumbar spondylosis  M47.816       2. Intervertebral disc disorder with radiculopathy of lumbar region  M51.16                      Subjective: Pt offers no new complaints this morning.       Objective: See treatment diary below      Assessment: Tolerated treatment well. Good compliance present with clinical exercise program. No increase of pain with current plan or fatigue. Patient exhibited good technique with therapeutic exercises and would benefit from continued PT.      Plan: Continue per plan of care.      Precautions: H/O Spinal cord injury, Cardiomyopathy, Lumbar radiculopathy, CHF, Cervical fusion, Cardiac surgery    Manuals 3/27 3/29 4/1   4/8 4/11 4/15 4/19 4/22 4/25 4/29   CPA of Lumbar spine Gr II+III  Gr II+III Gr II (focus on R UPA) Gr II (focus on R UPA) Gr II CPA  Gr II CPA Gr II CPA  Gr II CPA   R hip extension  Nv             IASTM using LLL   22 Ws 4' R paraspinals 22 Ws 4' R paraspinals 22 Ws 4' R paraspinals 22 Ws 5' R paraspinals 22 Ws 5' R paraspinals 22 Ws 5' R paraspinals  22 Ws 5' R paraspinals                Neuro Re-Ed             Bridge  10x           TrA      10x on ea 10x on ea  10x on ea   TrA Clamshell 10x rtb  15x on each with TrA Hip abd isometric 10x 5\" Hip abd isometric 10x 5\" Hip abd isometric 10x 5\"  Hip abd isometric 10x 5\" With rtb 10x hooklying  With rtb 20x hooklying   TrA kickouts        10x on ea  10x on ea   L sciatic nerve glides 10x3\"            R femoral nerve glide              TrA hold   20x5\"  30x5\" 30x5\" 30x5\" 20x5\"      Ther Ex             Elliptical  5' Lvl 6-14 5' Lvl 6-15 5' Lvl 6-15 5' Lvl 6-15 5' Lvl 6-15 5' Lvl 6-15 5' Lvl 6-15 5' Lvl 6-15 5' Lvl 6-15   Piriformis stretch              RE_EVALUATION performed            Press up 10x 5\"  10x5\"  Against wall 10x 5\" " "standing Against wall 7x 5\" standing Against wall 10x 5\" standing Against bhmxz46e 5\" standing Against wtixv24f 5\" standing Against lhpbo88n 5\" standing  Against brzkx69o 5\" standing   LTR 2' 2' 10x B 10x B 10x B 10x B 10x B 10x B  10x B   R hip flexor stretch  7x10\" R                                     Ther Activity                                       Education      15'    20'                               Modalities             MH   Seated x10' Seated x10' Seated x10'  Seated x10' Seated x10'  Seated x10'                1:1 with PT from [phone]                     "

## 2024-05-02 ENCOUNTER — OFFICE VISIT (OUTPATIENT)
Dept: PHYSICAL THERAPY | Facility: CLINIC | Age: 54
End: 2024-05-02
Payer: COMMERCIAL

## 2024-05-02 DIAGNOSIS — M51.16 INTERVERTEBRAL DISC DISORDER WITH RADICULOPATHY OF LUMBAR REGION: ICD-10-CM

## 2024-05-02 DIAGNOSIS — M47.816 LUMBAR SPONDYLOSIS: Primary | ICD-10-CM

## 2024-05-02 PROCEDURE — 97140 MANUAL THERAPY 1/> REGIONS: CPT | Performed by: PHYSICAL THERAPIST

## 2024-05-02 PROCEDURE — 97112 NEUROMUSCULAR REEDUCATION: CPT | Performed by: PHYSICAL THERAPIST

## 2024-05-02 PROCEDURE — 97110 THERAPEUTIC EXERCISES: CPT | Performed by: PHYSICAL THERAPIST

## 2024-05-02 NOTE — PROGRESS NOTES
"Daily Note     Today's date: 2024  Patient name: Gallo Gallegos  : 1970  MRN: [id number]  Referring provider: Alexi Ruiz MD  Dx:   Encounter Diagnosis     ICD-10-CM    1. Lumbar spondylosis  M47.816       2. Intervertebral disc disorder with radiculopathy of lumbar region  M51.16                      Subjective: Pt complaining of carpal tunnel like symptoms in the right hand. He reports having palmar tingling in index, middle, and ring finger. No new complaints of lumbar spine pain.       Objective: See treatment diary below      Assessment: Tolerated treatment well. Gave patient distal medial nerve glide to help with tingling into hand. Progressed POC to include bridges and quadruped hip extension. Initiated these exercises with only 5 reps, in order to reduce adverse effects that occurred last time we progressed. Pt seems to have developed a base with core stabilization program. My plan is to continues to progress him in a gradual pattern with graded exercises and activities. Patient exhibited good technique with therapeutic exercises and would benefit from continued PT      Plan: Continue per plan of care.      Precautions: H/O Spinal cord injury, Cardiomyopathy, Lumbar radiculopathy, CHF, Cervical fusion, Cardiac surgery    Manuals 5/2      4/11 4/15 4/19 4/22 4/25 4/29   CPA of Lumbar spine Gr II CPA    Gr II CPA  Gr II CPA Gr II CPA  Gr II CPA   R hip extension              IASTM using LLL 22 Ws 5' R paraspinals    22 Ws 4' R paraspinals 22 Ws 5' R paraspinals 22 Ws 5' R paraspinals 22 Ws 5' R paraspinals  22 Ws 5' R paraspinals                Neuro Re-Ed             Bridge 5x            TrA March 10x on ea      10x on ea 10x on ea  10x on ea   TrA Clamshell With rtb 20x hooklying    Hip abd isometric 10x 5\"  Hip abd isometric 10x 5\" With rtb 10x hooklying  With rtb 20x hooklying   TrA kickouts 10x on ea       10x on ea  10x on ea   Quad LE extension 5x on ea            L sciatic nerve glides      " "       R femoral nerve glide              TrA hold     30x5\" 30x5\" 20x5\"      Ther Ex             Elliptical 5' Lvl 6-15    5' Lvl 6-15 5' Lvl 6-15 5' Lvl 6-15 5' Lvl 6-15 5' Lvl 6-15 5' Lvl 6-15   Piriformis stretch              RE_EVALUATION             Press up Against zcvwo64m 5\" standing    Against wall 10x 5\" standing Against ifbgm00p 5\" standing Against nzdov83p 5\" standing Against biagi24o 5\" standing  Against dpthc95o 5\" standing   LTR 10x B    10x B 10x B 10x B 10x B  10x B   R hip flexor stretch                                       Ther Activity                                       Education      15'    20'                               Modalities             MH Seated x10'    Seated x10'  Seated x10' Seated x10'  Seated x10'                1:1 with PT from 1123-1201pm                       "

## 2024-05-06 ENCOUNTER — OFFICE VISIT (OUTPATIENT)
Dept: PHYSICAL THERAPY | Facility: CLINIC | Age: 54
End: 2024-05-06
Payer: COMMERCIAL

## 2024-05-06 DIAGNOSIS — M47.816 LUMBAR SPONDYLOSIS: Primary | ICD-10-CM

## 2024-05-06 DIAGNOSIS — M51.16 INTERVERTEBRAL DISC DISORDER WITH RADICULOPATHY OF LUMBAR REGION: ICD-10-CM

## 2024-05-06 PROCEDURE — 97140 MANUAL THERAPY 1/> REGIONS: CPT | Performed by: PHYSICAL THERAPIST

## 2024-05-06 PROCEDURE — 97110 THERAPEUTIC EXERCISES: CPT | Performed by: PHYSICAL THERAPIST

## 2024-05-06 PROCEDURE — 97112 NEUROMUSCULAR REEDUCATION: CPT | Performed by: PHYSICAL THERAPIST

## 2024-05-06 NOTE — PROGRESS NOTES
"Daily Note     Today's date: 2024  Patient name: Gallo Gallegos  : 1970  MRN: [id number]  Referring provider: Alexi Ruiz MD  Dx:   Encounter Diagnosis     ICD-10-CM    1. Lumbar spondylosis  M47.816       2. Intervertebral disc disorder with radiculopathy of lumbar region  M51.16                      Subjective: No c/o of discomfort from progression last week.       Objective: See treatment diary below      Assessment: Tolerated treatment well. Lumbar spine mobilizations improved to grade III with good tolerance. Discussed improved neural processing with this activity. Continued graded exercises and stabilization. Increase bridges to 10 reps with good tolerance. Patient exhibited good technique with therapeutic exercises and would benefit from continued PT.      Plan: Continue per plan of care.      Precautions: H/O Spinal cord injury, Cardiomyopathy, Lumbar radiculopathy, CHF, Cervical fusion, Cardiac surgery    Manuals 5/2   5/6   4/11 4/15 4/19 4/22 4/25 4/29   CPA of Lumbar spine Gr II CPA Gr II +III CPA   Gr II CPA  Gr II CPA Gr II CPA  Gr II CPA   R hip extension              IASTM using LLL 22 Ws 5' R paraspinals 22 Ws 5' R paraspinals   22 Ws 4' R paraspinals 22 Ws 5' R paraspinals 22 Ws 5' R paraspinals 22 Ws 5' R paraspinals  22 Ws 5' R paraspinals                Neuro Re-Ed             Bridge 5x 10x           TrA March 10x on ea 10x on ea     10x on ea 10x on ea  10x on ea   TrA Clamshell With rtb 20x hooklying    Hip abd isometric 10x 5\"  Hip abd isometric 10x 5\" With rtb 10x hooklying  With rtb 20x hooklying   TrA kickouts 10x on ea 10x on ea      10x on ea  10x on ea   Quad LE extension 5x on ea 5x on ea           L sciatic nerve glides             R femoral nerve glide              TrA hold     30x5\" 30x5\" 20x5\"      Standing hip extension  nv           Standing hip abduction  5x on ea ytb with TrA           Ther Ex             Elliptical 5' Lvl 6-15 5' Lvl 6-15   5' Lvl 6-15 5' Lvl 6-15 " "5' Lvl 6-15 5' Lvl 6-15 5' Lvl 6-15 5' Lvl 6-15   Piriformis stretch              RE_EVALUATION             Press up Against vkopa82y 5\" standing Against qscpk36e 5\" standing   Against wall 10x 5\" standing Against hffjz60l 5\" standing Against sdrqe13a 5\" standing Against muxwg25j 5\" standing  Against fkdkv52r 5\" standing   LTR 10x B 10x B   10x B 10x B 10x B 10x B  10x B   R hip flexor stretch                                       Ther Activity                                       Education      15'    20'                               Modalities              Seated x10' Seated x10'   Seated x10'  Seated x10' Seated x10'  Seated x10'                1:1 with PT from 815-4l                         "

## 2024-05-08 ENCOUNTER — OFFICE VISIT (OUTPATIENT)
Dept: PHYSICAL THERAPY | Facility: CLINIC | Age: 54
End: 2024-05-08
Payer: COMMERCIAL

## 2024-05-08 DIAGNOSIS — M51.16 INTERVERTEBRAL DISC DISORDER WITH RADICULOPATHY OF LUMBAR REGION: ICD-10-CM

## 2024-05-08 DIAGNOSIS — M47.816 LUMBAR SPONDYLOSIS: Primary | ICD-10-CM

## 2024-05-08 PROCEDURE — 97140 MANUAL THERAPY 1/> REGIONS: CPT | Performed by: PHYSICAL THERAPIST

## 2024-05-08 PROCEDURE — 97112 NEUROMUSCULAR REEDUCATION: CPT | Performed by: PHYSICAL THERAPIST

## 2024-05-08 PROCEDURE — 97110 THERAPEUTIC EXERCISES: CPT | Performed by: PHYSICAL THERAPIST

## 2024-05-08 NOTE — PROGRESS NOTES
Daily Note     Today's date: 2024  Patient name: Gallo Gallegos  : 1970  MRN: [id number]  Referring provider: Alexi Ruiz MD  Dx:   Encounter Diagnosis     ICD-10-CM    1. Lumbar spondylosis  M47.816       2. Intervertebral disc disorder with radiculopathy of lumbar region  M51.16           Start Time: 0853  Stop Time: 0940  Total time in clinic (min): 47 minutes    Subjective: Pt reports having his whole body feeling numb. He sees Dr. Esteves for CTS.       Objective: See treatment diary below      Assessment: Tolerated treatment well. Less tenderness present throughout lumbar paraspinal. Progressed plan to include standing hip extension and marching. Patient exhibited good technique with therapeutic exercises and would benefit from continued PT. Fatigue present in L/S s/p exercises.       Plan: Continue per plan of care.      Precautions: H/O Spinal cord injury, Cardiomyopathy, Lumbar radiculopathy, CHF, Cervical fusion, Cardiac surgery    Manuals    CPA of Lumbar spine Gr II CPA Gr II +III CPA Gr II +III CPA     Gr II CPA  Gr II CPA   R hip extension              IASTM using LLL 22 Ws 5' R paraspinals 22 Ws 5' R paraspinals 22 Ws 5' R paraspinals     22 Ws 5' R paraspinals  22 Ws 5' R paraspinals                Neuro Re-Ed             Bridge 5x 10x 10x          TrA March 10x on ea 10x on ea 10x on ea     10x on ea  10x on ea   TrA Clamshell With rtb 20x hooklying       With rtb 10x hooklying  With rtb 20x hooklying   TrA kickouts 10x on ea 10x on ea 10x on ea     10x on ea  10x on ea   Quad LE extension 5x on ea 5x on ea           L sciatic nerve glides             R femoral nerve glide              TrA hold             Standing hip extension  nv 5x on ea ytb with TrA          Standing hip abduction  5x on ea ytb with TrA 5x on ea ytb with TrA          Ther Ex             Elliptical 5' Lvl 6-15 5' Lvl 6-15 5' Lvl 6-15     5' Lvl 6-15 5' Lvl 6-15 5' Lvl 6-15   Piriformis  "stretch              RE_EVALUATION             Press up Against ifzcr14w 5\" standing Against mzyvh60q 5\" standing Against bbxfe58a 5\" standing     Against rhxeg80s 5\" standing  Against sbmjl54t 5\" standing   LTR 10x B 10x B 10x B     10x B  10x B   R hip flexor stretch                                       Ther Activity             TrA standing March 5x on ea           Anti-rotation press   Nv           Education         20'                               Modalities             MH Seated x10' Seated x10' Seated x10'     Seated x10'  Seated x10'                1:1 with PT from [phone]                           "

## 2024-05-10 ENCOUNTER — TELEPHONE (OUTPATIENT)
Dept: NEUROSURGERY | Facility: CLINIC | Age: 54
End: 2024-05-10

## 2024-05-10 NOTE — TELEPHONE ENCOUNTER
5/10/24 REFERRAL RECEIVED FROM DR. BARRON OFFICE [phone] FOR AN APPOINTMENT FOR CERVICAL DYSTONIA. ADVISED INTAKE OF REFERRAL.SCANNED TO [facility].

## 2024-05-10 NOTE — TELEPHONE ENCOUNTER
Received a call from patient questioning who Dr Bassett would recommend for carpel tunnel surgery.     Returned call to patient and advised Dr Bassett does the surgery. He stated he would like to be seen to discuss this. Advised this RN will route to the intake team for him to get scheduled.     He was appreciative of the call back.

## 2024-05-13 ENCOUNTER — APPOINTMENT (OUTPATIENT)
Dept: PHYSICAL THERAPY | Facility: CLINIC | Age: 54
End: 2024-05-13
Payer: COMMERCIAL

## 2024-05-14 ENCOUNTER — CONSULT (OUTPATIENT)
Dept: CARDIOLOGY CLINIC | Facility: CLINIC | Age: 54
End: 2024-05-14
Payer: MEDICARE

## 2024-05-14 VITALS
SYSTOLIC BLOOD PRESSURE: 106 MMHG | HEIGHT: 69 IN | BODY MASS INDEX: 31.96 KG/M2 | DIASTOLIC BLOOD PRESSURE: 70 MMHG | HEART RATE: 59 BPM | WEIGHT: 215.8 LBS

## 2024-05-14 DIAGNOSIS — E78.2 MIXED HYPERLIPIDEMIA: ICD-10-CM

## 2024-05-14 DIAGNOSIS — I42.9 CARDIOMYOPATHY, UNSPECIFIED TYPE (HCC): Primary | ICD-10-CM

## 2024-05-14 DIAGNOSIS — I49.3 PREMATURE VENTRICULAR CONTRACTION: ICD-10-CM

## 2024-05-14 PROCEDURE — 99214 OFFICE O/P EST MOD 30 MIN: CPT | Performed by: INTERNAL MEDICINE

## 2024-05-14 PROCEDURE — 93000 ELECTROCARDIOGRAM COMPLETE: CPT | Performed by: INTERNAL MEDICINE
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
## 2024-05-16 ENCOUNTER — OFFICE VISIT (OUTPATIENT)
Dept: PHYSICAL THERAPY | Facility: CLINIC | Age: 54
End: 2024-05-16
Payer: COMMERCIAL

## 2024-05-16 ENCOUNTER — TELEPHONE (OUTPATIENT)
Dept: GASTROENTEROLOGY | Facility: MEDICAL CENTER | Age: 54
End: 2024-05-16

## 2024-05-16 DIAGNOSIS — M47.816 LUMBAR SPONDYLOSIS: Primary | ICD-10-CM

## 2024-05-16 DIAGNOSIS — M51.16 INTERVERTEBRAL DISC DISORDER WITH RADICULOPATHY OF LUMBAR REGION: ICD-10-CM

## 2024-05-16 PROCEDURE — 97110 THERAPEUTIC EXERCISES: CPT | Performed by: PHYSICAL THERAPIST

## 2024-05-16 PROCEDURE — 97112 NEUROMUSCULAR REEDUCATION: CPT | Performed by: PHYSICAL THERAPIST

## 2024-05-16 PROCEDURE — 97140 MANUAL THERAPY 1/> REGIONS: CPT | Performed by: PHYSICAL THERAPIST

## 2024-05-16 NOTE — PROGRESS NOTES
Daily Note     Today's date: 2024  Patient name: Gallo Gallegos  : 1970  MRN: [id number]  Referring provider: Alexi Ruiz MD  Dx:   Encounter Diagnosis     ICD-10-CM    1. Lumbar spondylosis  M47.816       2. Intervertebral disc disorder with radiculopathy of lumbar region  M51.16                      Subjective: Pt reports right sided discomfort more than central this morning. He plans to see Dr. Mcintosh for this issue. Pt's program was progressed last week without adverse effects.       Objective: See treatment diary below      Assessment: Focused manual therapy on the right side of the lumbar spine. Tolerated treatment well. Continued core stabilization with good tolerance. Initiated anti-rotation press for rotation stability. Good form present throughout treatment.  Patient demonstrated fatigue post treatment and exhibited good technique with therapeutic exercises.       Plan: Continue per plan of care.      Precautions: H/O Spinal cord injury, Cardiomyopathy, Lumbar radiculopathy, CHF, Cervical fusion, Cardiac surgery    Manuals    CPA of Lumbar spine Gr II CPA Gr II +III CPA Gr II +III CPA Gr II +III CPA    Gr II CPA  Gr II CPA   R hip extension              IASTM using LLL 22 Ws 5' R paraspinals 22 Ws 5' R paraspinals 22 Ws 5' R paraspinals 22 Ws 5' R paraspinals    22 Ws 5' R paraspinals  22 Ws 5' R paraspinals                Neuro Re-Ed             Bridge 5x 10x 10x 10x         TrA March 10x on ea 10x on ea 10x on ea     10x on ea  10x on ea   TrA Clamshell With rtb 20x hooklying       With rtb 10x hooklying  With rtb 20x hooklying   TrA kickouts 10x on ea 10x on ea 10x on ea 10x on ea    10x on ea  10x on ea   Quad LE extension 5x on ea 5x on ea           L sciatic nerve glides             R femoral nerve glide              TrA hold             Standing hip extension  nv 5x on ea ytb with TrA 5x on ea ytb with TrA         Standing hip abduction  5x on ea ytb  "with TrA 5x on ea ytb with TrA 5x on ea ytb with TrA         Ther Ex             Elliptical 5' Lvl 6-15 5' Lvl 6-15 5' Lvl 6-15 5' Lvl 6-15    5' Lvl 6-15 5' Lvl 6-15 5' Lvl 6-15   Piriformis stretch              RE_EVALUATION             Press up Against tvyiv98u 5\" standing Against xmstc35e 5\" standing Against zwitj89t 5\" standing Against efezh39x 5\" standing    Against kfzly62j 5\" standing  Against gwazd44y 5\" standing   LTR 10x B 10x B 10x B 10x B    10x B  10x B   R hip flexor stretch                                       Ther Activity             TrA standing March   5x on ea           Anti-rotation press   Nv  10x on ea rtb -cueing for posture         Education         20'                               Modalities             MH Seated x10' Seated x10' Seated x10' Seated x10'    Seated x10'  Seated x10'                1:1 with PT from [phone]                             "

## 2024-05-16 NOTE — TELEPHONE ENCOUNTER
Called patient to notify that location of procedure has been changed due to provider schedule change; location is the only change as of this time.

## 2024-05-20 ENCOUNTER — OFFICE VISIT (OUTPATIENT)
Dept: PHYSICAL THERAPY | Facility: CLINIC | Age: 54
End: 2024-05-20
Payer: COMMERCIAL

## 2024-05-20 DIAGNOSIS — M51.16 INTERVERTEBRAL DISC DISORDER WITH RADICULOPATHY OF LUMBAR REGION: ICD-10-CM

## 2024-05-20 DIAGNOSIS — M47.816 LUMBAR SPONDYLOSIS: Primary | ICD-10-CM

## 2024-05-20 PROCEDURE — 97110 THERAPEUTIC EXERCISES: CPT | Performed by: PHYSICAL THERAPIST

## 2024-05-20 PROCEDURE — 97140 MANUAL THERAPY 1/> REGIONS: CPT | Performed by: PHYSICAL THERAPIST

## 2024-05-20 PROCEDURE — 97112 NEUROMUSCULAR REEDUCATION: CPT | Performed by: PHYSICAL THERAPIST

## 2024-05-20 NOTE — PROGRESS NOTES
Daily Note     Today's date: 2024  Patient name: Gallo Gallegos  : 1970  MRN: [id number]  Referring provider: Alexi Ruiz MD  Dx:   Encounter Diagnosis     ICD-10-CM    1. Lumbar spondylosis  M47.816       2. Intervertebral disc disorder with radiculopathy of lumbar region  M51.16                      Subjective: Pt reports improvement in lower back but neck is locking up. Pt notes that his pain is occurring with 5 minutes of sitting. Pt has a history of C5-7 fusion with Dr. Bird 10 years ago.       Objective: See treatment diary below      Assessment: Tolerated treatment well. Lumbar spine discomfort is improving. Exercise tolerance for core stabilization is improving. Patient exhibited good technique with therapeutic exercises and would benefit from continued PT. Pt appropriately fatigue with program.       Plan: Continue per plan of care.      Precautions: H/O Spinal cord injury, Cardiomyopathy, Lumbar radiculopathy, CHF, Cervical fusion, Cardiac surgery    Manuals    CPA of Lumbar spine Gr II CPA Gr II +III CPA Gr II +III CPA Gr II +III CPA Gr II +III CPA   Gr II CPA  Gr II CPA   R hip extension              IASTM using LLL 22 Ws 5' R paraspinals 22 Ws 5' R paraspinals 22 Ws 5' R paraspinals 22 Ws 5' R paraspinals 22 Ws 5' R paraspinals   22 Ws 5' R paraspinals  22 Ws 5' R paraspinals                Neuro Re-Ed             Bridge 5x 10x 10x 10x 10x         TrA March 10x on ea 10x on ea 10x on ea     10x on ea  10x on ea   TrA Clamshell With rtb 20x hooklying       With rtb 10x hooklying  With rtb 20x hooklying   TrA kickouts 10x on ea 10x on ea 10x on ea 10x on ea 10x on ea   10x on ea  10x on ea   Quad LE extension 5x on ea 5x on ea           L sciatic nerve glides             R femoral nerve glide              TrA hold             Standing hip extension  nv 5x on ea ytb with TrA 5x on ea ytb with TrA 5x on ea ytb with TrA        Standing hip abduction  5x  "on ea ytb with TrA 5x on ea ytb with TrA 5x on ea ytb with TrA 5x on ea ytb with TrA        Ther Ex             Elliptical 5' Lvl 6-15 5' Lvl 6-15 5' Lvl 6-15 5' Lvl 6-15 5' Lvl 6-15   5' Lvl 6-15 5' Lvl 6-15 5' Lvl 6-15   Piriformis stretch              RE_EVALUATION             Press up Against qeupi98j 5\" standing Against edbre54f 5\" standing Against wlavi30f 5\" standing Against vxule36n 5\" standing Against sekrb18k 5\" standing   Against gnswb05y 5\" standing  Against xtret19h 5\" standing   LTR 10x B 10x B 10x B 10x B 10x B   10x B  10x B   R hip flexor stretch                                       Ther Activity             TrA standing March   5x on ea   10x on ea        Anti-rotation press   Nv  10x on ea rtb -cueing for posture 10x on ea rtb -cueing for posture        Education         20'                               Modalities             MH Seated x10' Seated x10' Seated x10' Seated x10' Seated x10'   Seated x10'  Seated x10'                1:1 with PT from 123-2y                               "

## 2024-05-23 ENCOUNTER — OFFICE VISIT (OUTPATIENT)
Dept: PHYSICAL THERAPY | Facility: CLINIC | Age: 54
End: 2024-05-23
Payer: COMMERCIAL

## 2024-05-23 DIAGNOSIS — M51.16 INTERVERTEBRAL DISC DISORDER WITH RADICULOPATHY OF LUMBAR REGION: ICD-10-CM

## 2024-05-23 DIAGNOSIS — M47.816 LUMBAR SPONDYLOSIS: Primary | ICD-10-CM

## 2024-05-23 PROCEDURE — 97140 MANUAL THERAPY 1/> REGIONS: CPT | Performed by: PHYSICAL THERAPIST

## 2024-05-23 PROCEDURE — 97112 NEUROMUSCULAR REEDUCATION: CPT | Performed by: PHYSICAL THERAPIST

## 2024-05-23 PROCEDURE — 97110 THERAPEUTIC EXERCISES: CPT | Performed by: PHYSICAL THERAPIST

## 2024-05-23 NOTE — PROGRESS NOTES
Daily Note     Today's date: 2024  Patient name: Gallo Gallegos  : 1970  MRN: [id number]  Referring provider: Alexi Ruiz MD  Dx:   Encounter Diagnosis     ICD-10-CM    1. Lumbar spondylosis  M47.816       2. Intervertebral disc disorder with radiculopathy of lumbar region  M51.16                      Subjective: patient mentions he has noticed improvements in lower back symptoms since starting physical therapy.      Objective: See treatment diary below      Assessment: Tolerated treatment well. Continued with program as noted below. He continues to report benefit/relief with manual therapy. Patient demonstrated fatigue post treatment, exhibited good technique with therapeutic exercises, and would benefit from continued PT      Plan: Continue per plan of care.  Progress treatment as tolerated.       Precautions: H/O Spinal cord injury, Cardiomyopathy, Lumbar radiculopathy, CHF, Cervical fusion, Cardiac surgery    Manuals    CPA of Lumbar spine Gr II CPA Gr II +III CPA Gr II +III CPA Gr II +III CPA Gr II +III CPA Gr II +III CPA    Gr II CPA   R hip extension              IASTM using LLL 22 Ws 5' R paraspinals 22 Ws 5' R paraspinals 22 Ws 5' R paraspinals 22 Ws 5' R paraspinals 22 Ws 5' R paraspinals 22 Ws 5' R paraspinals    22 Ws 5' R paraspinals                Neuro Re-Ed             Bridge 5x 10x 10x 10x 10x  10x       TrA March 10x on ea 10x on ea 10x on ea       10x on ea   TrA Clamshell With rtb 20x hooklying         With rtb 20x hooklying   TrA kickouts 10x on ea 10x on ea 10x on ea 10x on ea 10x on ea 10x on ea    10x on ea   Quad LE extension 5x on ea 5x on ea           L sciatic nerve glides             R femoral nerve glide              TrA hold             Standing hip extension  nv 5x on ea ytb with TrA 5x on ea ytb with TrA 5x on ea ytb with TrA 5x on ea ytb with TrA       Standing hip abduction  5x on ea ytb with TrA 5x on ea ytb with TrA 5x on ea  "ytb with TrA 5x on ea ytb with TrA 5x on ea ytb with TrA        Ther Ex             Elliptical 5' Lvl 6-15 5' Lvl 6-15 5' Lvl 6-15 5' Lvl 6-15 5' Lvl 6-15 5' lvl 6-15   5' Lvl 6-15 5' Lvl 6-15   Piriformis stretch              RE_EVALUATION             Press up Against [id number] 5\" standing Against [id number] 5\" standing Against [id number] 5\" standing Against jrdft42b 5\" standing Against bazxg66b 5\" standing Against xbibx06y 5\" standing    Against msain08n 5\" standing   LTR 10x B 10x B 10x B 10x B 10x B 10x B    10x B   R hip flexor stretch                                       Ther Activity             TrA standing March   5x on ea   10x on ea 10x on ea       Anti-rotation press   Nv  10x on ea rtb -cueing for posture 10x on ea rtb -cueing for posture 10x on ea rtb -cueing for posture        Education         20'                               Modalities             MH Seated x10' Seated x10' Seated x10' Seated x10' Seated x10' Seated x10'    Seated x10'                                             "

## 2024-05-29 ENCOUNTER — OFFICE VISIT (OUTPATIENT)
Dept: PHYSICAL THERAPY | Facility: CLINIC | Age: 54
End: 2024-05-29
Payer: COMMERCIAL

## 2024-05-29 DIAGNOSIS — M47.816 LUMBAR SPONDYLOSIS: Primary | ICD-10-CM

## 2024-05-29 DIAGNOSIS — M51.16 INTERVERTEBRAL DISC DISORDER WITH RADICULOPATHY OF LUMBAR REGION: ICD-10-CM

## 2024-05-29 PROCEDURE — 97112 NEUROMUSCULAR REEDUCATION: CPT | Performed by: PHYSICAL THERAPIST

## 2024-05-29 PROCEDURE — 97110 THERAPEUTIC EXERCISES: CPT | Performed by: PHYSICAL THERAPIST

## 2024-05-29 PROCEDURE — 97140 MANUAL THERAPY 1/> REGIONS: CPT | Performed by: PHYSICAL THERAPIST

## 2024-05-29 NOTE — PROGRESS NOTES
Daily Note     Today's date: 2024  Patient name: Gallo Gallegos  : 1970  MRN: [id number]  Referring provider: Alexi Ruiz MD  Dx:   Encounter Diagnosis     ICD-10-CM    1. Lumbar spondylosis  M47.816                      Subjective: Pt denies any new complaints at lumbar spine. Continued discomfort present in cervicothoracic region.       Objective: See treatment diary below      Assessment: Tolerated treatment well. Implemented Lat sweep with TrA into POC. Overall, patient is progressing well with implementation of 1 new exercise per session. No flare ups present with program. Pt is trending in a positive progression with principles of gradual loading and graded exposure to movements. Patient exhibited good technique with therapeutic exercises and would benefit from continued PT.      Plan: Continue per plan of care.      Precautions: H/O Spinal cord injury, Cardiomyopathy, Lumbar radiculopathy, CHF, Cervical fusion, Cardiac surgery    Manuals       CPA of Lumbar spine Gr II CPA Gr II +III CPA Gr II +III CPA Gr II +III CPA Gr II +III CPA Gr II +III CPA Gr II +III CPA      R hip extension              IASTM using LLL 22 Ws 5' R paraspinals 22 Ws 5' R paraspinals 22 Ws 5' R paraspinals 22 Ws 5' R paraspinals 22 Ws 5' R paraspinals 22 Ws 5' R paraspinals 22 Ws 5' R paraspinals                   Neuro Re-Ed                          Bridge 5x 10x 10x 10x 10x  10x 10x      TrA March 10x on ea 10x on ea 10x on ea          TrA Clamshell With rtb 20x hooklying            TrA kickouts 10x on ea 10x on ea 10x on ea 10x on ea 10x on ea 10x on ea 10x on ea      Quad LE extension 5x on ea 5x on ea           L sciatic nerve glides             R femoral nerve glide              TrA hold             Standing hip extension  nv 5x on ea ytb with TrA 5x on ea ytb with TrA 5x on ea ytb with TrA 5x on ea ytb with TrA       Standing hip abduction  5x on ea ytb with TrA 5x on ea ytb with TrA  "5x on ea ytb with TrA 5x on ea ytb with TrA 5x on ea ytb with TrA        LAT sweep with TrA       10x rtb       Ther Ex             Elliptical 5' Lvl 6-15 5' Lvl 6-15 5' Lvl 6-15 5' Lvl 6-15 5' Lvl 6-15 5' lvl 6-15 5' lvl 6-15      Piriformis stretch              RE_EVALUATION             Press up Against vptun92e 5\" standing Against pourc14t 5\" standing Against pkega75j 5\" standing Against iczas05q 5\" standing Against nzvfx78s 5\" standing Against zjfbf22e 5\" standing Against pwlfy46i 5\" standing      LTR 10x B 10x B 10x B 10x B 10x B 10x B 10x B      R hip flexor stretch                                       Ther Activity             TrA standing March   5x on ea   10x on ea 10x on ea 10x on ea      Anti-rotation press   Nv  10x on ea rtb -cueing for posture 10x on ea rtb -cueing for posture 10x on ea rtb -cueing for posture  10x on ea rtb -cueing for posture       Education                                       Modalities              Seated x10' Seated x10' Seated x10' Seated x10' Seated x10' Seated x10' Defers                    1:1 with PT from [id number]                               "
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
## 2024-05-31 ENCOUNTER — EVALUATION (OUTPATIENT)
Dept: PHYSICAL THERAPY | Facility: CLINIC | Age: 54
End: 2024-05-31
Payer: COMMERCIAL

## 2024-05-31 ENCOUNTER — TELEPHONE (OUTPATIENT)
Age: 54
End: 2024-05-31

## 2024-05-31 DIAGNOSIS — M51.16 INTERVERTEBRAL DISC DISORDER WITH RADICULOPATHY OF LUMBAR REGION: ICD-10-CM

## 2024-05-31 DIAGNOSIS — M47.816 LUMBAR SPONDYLOSIS: Primary | ICD-10-CM

## 2024-05-31 PROCEDURE — 97112 NEUROMUSCULAR REEDUCATION: CPT | Performed by: PHYSICAL THERAPIST

## 2024-05-31 PROCEDURE — 97110 THERAPEUTIC EXERCISES: CPT | Performed by: PHYSICAL THERAPIST

## 2024-05-31 PROCEDURE — 97140 MANUAL THERAPY 1/> REGIONS: CPT | Performed by: PHYSICAL THERAPIST
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
## 2024-06-03 ENCOUNTER — OFFICE VISIT (OUTPATIENT)
Dept: PHYSICAL THERAPY | Facility: CLINIC | Age: 54
End: 2024-06-03
Payer: COMMERCIAL

## 2024-06-03 DIAGNOSIS — M51.16 INTERVERTEBRAL DISC DISORDER WITH RADICULOPATHY OF LUMBAR REGION: ICD-10-CM

## 2024-06-03 DIAGNOSIS — M47.816 LUMBAR SPONDYLOSIS: Primary | ICD-10-CM

## 2024-06-03 PROCEDURE — 97110 THERAPEUTIC EXERCISES: CPT

## 2024-06-03 PROCEDURE — 97112 NEUROMUSCULAR REEDUCATION: CPT

## 2024-06-03 PROCEDURE — 97140 MANUAL THERAPY 1/> REGIONS: CPT

## 2024-06-03 NOTE — PROGRESS NOTES
Daily Note     Today's date: 6/3/2024  Patient name: Gallo Gallegos  : 1970  MRN: [id number]  Referring provider: Alexi Ruiz MD  Dx:   Encounter Diagnosis     ICD-10-CM    1. Lumbar spondylosis  M47.816       2. Intervertebral disc disorder with radiculopathy of lumbar region  M51.16                      Subjective: Pt reports no adverse effects following last visit.       Objective: See treatment diary below      Assessment: Tolerated treatment well. Patient exhibited good technique with therapeutic exercises and would benefit from continued PT.  No adverse effect noted w/ manual techniques, including LLL. No increased discomfort noted w/ TE performed.       Plan: Continue per plan of care.      Precautions: H/O Spinal cord injury, Cardiomyopathy, Lumbar radiculopathy, CHF, Cervical fusion, Cardiac surgery    Manuals 5/2   5/6 5/8 5/16 5/20 5/23 5/29 5/31 6/3    CPA of Lumbar spine Gr II CPA Gr II +III CPA Gr II +III CPA Gr II +III CPA Gr II +III CPA Gr II +III CPA Gr II +III CPA  STM (LM)    R hip extension              IASTM using LLL 22 Ws 5' R paraspinals 22 Ws 5' R paraspinals 22 Ws 5' R paraspinals 22 Ws 5' R paraspinals 22 Ws 5' R paraspinals 22 Ws 5' R paraspinals 22 Ws 5' R paraspinals  22Ws 5' R paraspinals                 Neuro Re-Ed                          Bridge 5x 10x 10x 10x 10x  10x 10x  x10    TrA March 10x on ea 10x on ea 10x on ea          TrA Clamshell With rtb 20x hooklying            TrA kickouts 10x on ea 10x on ea 10x on ea 10x on ea 10x on ea 10x on ea 10x on ea  10x on ea    Quad LE extension 5x on ea 5x on ea           L sciatic nerve glides             R femoral nerve glide              TrA hold             Standing hip extension  nv 5x on ea ytb with TrA 5x on ea ytb with TrA 5x on ea ytb with TrA 5x on ea ytb with TrA   5x on ea YTB w/ TrA    Standing hip abduction  5x on ea ytb with TrA 5x on ea ytb with TrA 5x on ea ytb with TrA 5x on ea ytb with TrA 5x on ea ytb with TrA    " 5x on ea YTB w/ TrA    LAT sweep with TrA       10x rtb       Ther Ex             Elliptical 5' Lvl 6-15 5' Lvl 6-15 5' Lvl 6-15 5' Lvl 6-15 5' Lvl 6-15 5' lvl 6-15 5' lvl 6-15 5' lvl 6-15 5' lv 6-15    Piriformis stretch              RE_EVALUATION             Press up Against jrttv40p 5\" standing Against bedfj38m 5\" standing Against rjhfr93v 5\" standing Against kkund54a 5\" standing Against xqzxc21e 5\" standing Against vrcxl93m 5\" standing Against vdbbi66r 5\" standing  Against table 10x5\" standing    LTR 10x B 10x B 10x B 10x B 10x B 10x B 10x B  10x B    R hip flexor stretch                                       Ther Activity             TrA standing March   5x on ea   10x on ea 10x on ea 10x on ea  10x on ea    Anti-rotation press   Nv  10x on ea rtb -cueing for posture 10x on ea rtb -cueing for posture 10x on ea rtb -cueing for posture  10x on ea rtb -cueing for posture   10x on ea RTB- cueing for posture    Education                                       Modalities              Seated x10' Seated x10' Seated x10' Seated x10' Seated x10' Seated x10' Defers                    [email or web address]  Access Code: [email or web address]                               "

## 2024-06-04 NOTE — TELEPHONE ENCOUNTER
This RN heard back from Dr. Bassett and phoned patient with that update.  Patient request to come in and be seen by AP for his neck concerns.  This RN assisted with scheduling.  Patient appreciative for assistance.

## 2024-06-06 ENCOUNTER — OFFICE VISIT (OUTPATIENT)
Dept: PHYSICAL THERAPY | Facility: CLINIC | Age: 54
End: 2024-06-06
Payer: COMMERCIAL

## 2024-06-06 DIAGNOSIS — M51.16 INTERVERTEBRAL DISC DISORDER WITH RADICULOPATHY OF LUMBAR REGION: ICD-10-CM

## 2024-06-06 DIAGNOSIS — M47.816 LUMBAR SPONDYLOSIS: Primary | ICD-10-CM

## 2024-06-06 PROCEDURE — 97110 THERAPEUTIC EXERCISES: CPT | Performed by: PHYSICAL THERAPIST

## 2024-06-06 PROCEDURE — 97140 MANUAL THERAPY 1/> REGIONS: CPT | Performed by: PHYSICAL THERAPIST

## 2024-06-06 PROCEDURE — 97112 NEUROMUSCULAR REEDUCATION: CPT | Performed by: PHYSICAL THERAPIST

## 2024-06-06 NOTE — PROGRESS NOTES
Daily Note     Today's date: 2024  Patient name: Gallo Gallegos  : 1970  MRN: [id number]  Referring provider: Alexi Ruiz MD  Dx:   Encounter Diagnosis     ICD-10-CM    1. Lumbar spondylosis  M47.816       2. Intervertebral disc disorder with radiculopathy of lumbar region  M51.16                      Subjective: Pt reports some discomfort in lumbar spine on the left side. Denies any causative factor. He plans to see is physiatrist on Tuesday next week to discuss concerns with his neck.       Objective: See treatment diary below      Assessment: Tolerated treatment well. Increased hip abduction and extension with TB to 10 reps. Discomfort present after 8 repetitions of standing lumbar spine extensions. However, patient's current low back pain is progressing well with graded activity and exposure. Our plan is to treat his next after 1 more week of formal PT for his lumbar spine. Patient exhibited good technique with therapeutic exercises and would benefit from continued PT.       Plan: Continue per plan of care.      Precautions: H/O Spinal cord injury, Cardiomyopathy, Lumbar radiculopathy, CHF, Cervical fusion, Cardiac surgery    Manuals 5/2   5/6 5/8 5/16 5/20 5/23 5/29 5/31 6/3 6/6   CPA of Lumbar spine Gr II CPA Gr II +III CPA Gr II +III CPA Gr II +III CPA Gr II +III CPA Gr II +III CPA Gr II +III CPA  STM (LM) Gr II+ III CPA   R hip extension              IASTM using LLL 22 Ws 5' R paraspinals 22 Ws 5' R paraspinals 22 Ws 5' R paraspinals 22 Ws 5' R paraspinals 22 Ws 5' R paraspinals 22 Ws 5' R paraspinals 22 Ws 5' R paraspinals  22Ws 5' R paraspinals 22Ws 5' R paraspinals                Neuro Re-Ed                          Bridge 5x 10x 10x 10x 10x  10x 10x  x10 10x   TrA March 10x on ea 10x on ea 10x on ea          TrA Clamshell With rtb 20x hooklying            TrA kickouts 10x on ea 10x on ea 10x on ea 10x on ea 10x on ea 10x on ea 10x on ea  10x on ea 10x on ea   Quad LE extension 5x on ea 5x on  "ea           L sciatic nerve glides             R femoral nerve glide              TrA hold             Standing hip extension  nv 5x on ea ytb with TrA 5x on ea ytb with TrA 5x on ea ytb with TrA 5x on ea ytb with TrA   5x on ea YTB w/ TrA 10x on ea YTB w/ TrA   Standing hip abduction  5x on ea ytb with TrA 5x on ea ytb with TrA 5x on ea ytb with TrA 5x on ea ytb with TrA 5x on ea ytb with TrA    5x on ea YTB w/ TrA 10x on ea YTB w/ TrA   LAT sweep with TrA       10x rtb       Ther Ex             Elliptical 5' Lvl 6-15 5' Lvl 6-15 5' Lvl 6-15 5' Lvl 6-15 5' Lvl 6-15 5' lvl 6-15 5' lvl 6-15 5' lvl 6-15 5' lv 6-15 5' lv 6-15   Piriformis stretch              RE_EVALUATION             Press up Against kzehp38l 5\" standing Against vtgsm29s 5\" standing Against psqmj67n 5\" standing Against ijxrs68n 5\" standing Against hyicv80e 5\" standing Against mizac42q 5\" standing Against inkvx45d 5\" standing  Against table 10x5\" standing Against table 8x5\" standing   LTR 10x B 10x B 10x B 10x B 10x B 10x B 10x B  10x B    R hip flexor stretch                                       Ther Activity             TrA standing March   5x on ea   10x on ea 10x on ea 10x on ea  10x on ea 10x on ea   Anti-rotation press   Nv  10x on ea rtb -cueing for posture 10x on ea rtb -cueing for posture 10x on ea rtb -cueing for posture  10x on ea rtb -cueing for posture   10x on ea RTB- cueing for posture 10x on ea RTB- cueing for posture   Education                                       Modalities              Seated x10' Seated x10' Seated x10' Seated x10' Seated x10' Seated x10' Defers    Seated x10'                [email or web address]  Access Code: [email or web address]    1;1 with PT from [id number]                             "

## 2024-06-10 ENCOUNTER — OFFICE VISIT (OUTPATIENT)
Dept: PHYSICAL THERAPY | Facility: CLINIC | Age: 54
End: 2024-06-10
Payer: COMMERCIAL

## 2024-06-10 DIAGNOSIS — M51.16 INTERVERTEBRAL DISC DISORDER WITH RADICULOPATHY OF LUMBAR REGION: ICD-10-CM

## 2024-06-10 DIAGNOSIS — M47.816 LUMBAR SPONDYLOSIS: Primary | ICD-10-CM

## 2024-06-10 PROCEDURE — 97140 MANUAL THERAPY 1/> REGIONS: CPT | Performed by: PHYSICAL THERAPIST

## 2024-06-10 PROCEDURE — 97110 THERAPEUTIC EXERCISES: CPT | Performed by: PHYSICAL THERAPIST

## 2024-06-10 NOTE — PROGRESS NOTES
Daily Note     Today's date: 6/10/2024  Patient name: Gallo Gallegos  : 1970  MRN: [id number]  Referring provider: Alexi Ruiz MD  Dx:   Encounter Diagnosis     ICD-10-CM    1. Lumbar spondylosis  M47.816       2. Intervertebral disc disorder with radiculopathy of lumbar region  M51.16                      Subjective: Pt offers no new complaints this morning. He will see his physiatrist tomorrow for his neck. He would like to initiate treatment on his neck next week.       Objective: See treatment diary below      Assessment: Tolerated treatment well. Pt demonstrating excellent compliance with clinical and HEP.  No adverse effects with strengthening program. Patient exhibited good technique with therapeutic exercises and would benefit from continued PT.       Plan: Continue per plan of care.      Precautions: H/O Spinal cord injury, Cardiomyopathy, Lumbar radiculopathy, CHF, Cervical fusion, Cardiac surgery    Manuals 6/10   5/16 5/20 5/23 5/29 5/31 6/3 6/6   CPA of Lumbar spine Gr II+ III CPA   Gr II +III CPA Gr II +III CPA Gr II +III CPA Gr II +III CPA  STM (LM) Gr II+ III CPA   R hip extension              IASTM using LLL 22Ws 5' R paraspinals   22 Ws 5' R paraspinals 22 Ws 5' R paraspinals 22 Ws 5' R paraspinals 22 Ws 5' R paraspinals  22Ws 5' R paraspinals 22Ws 5' R paraspinals                Neuro Re-Ed                          Bridge 10x   10x 10x  10x 10x  x10 10x   TrA March             TrA Clamshell             TrA kickouts 10x on ea   10x on ea 10x on ea 10x on ea 10x on ea  10x on ea 10x on ea   Quad LE extension             L sciatic nerve glides             R femoral nerve glide              TrA hold             Standing hip extension 10x on ea YTB w/ TrA   5x on ea ytb with TrA 5x on ea ytb with TrA 5x on ea ytb with TrA   5x on ea YTB w/ TrA 10x on ea YTB w/ TrA   Standing hip abduction 10x on ea YTB w/ TrA   5x on ea ytb with TrA 5x on ea ytb with TrA 5x on ea ytb with TrA    5x on ea YTB w/  "TrA 10x on ea YTB w/ TrA   LAT sweep with TrA       10x rtb       Ther Ex             Elliptical 5' lv 6-15   5' Lvl 6-15 5' Lvl 6-15 5' lvl 6-15 5' lvl 6-15 5' lvl 6-15 5' lv 6-15 5' lv 6-15   Piriformis stretch              RE_EVALUATION             Press up Against table 10x5\" standing   Against [id number] 5\" standing Against [id number] 5\" standing Against [id number] 5\" standing Against qecnt09y 5\" standing  Against table 10x5\" standing Against table 8x5\" standing   LTR 10x B   10x B 10x B 10x B 10x B  10x B    R hip flexor stretch                                       Ther Activity             TrA standing March 10x on ea    10x on ea 10x on ea 10x on ea  10x on ea 10x on ea   Anti-rotation press 10x on ea RTB- cueing for posture   10x on ea rtb -cueing for posture 10x on ea rtb -cueing for posture 10x on ea rtb -cueing for posture  10x on ea rtb -cueing for posture   10x on ea RTB- cueing for posture 10x on ea RTB- cueing for posture   Education                                       Modalities             MH Seated x10'   Seated x10' Seated x10' Seated x10' Defers    Seated x10'                [facility]  Access Code: [email or web address]    1;1 with PT from [phone]                               "

## 2024-06-11 ENCOUNTER — TELEPHONE (OUTPATIENT)
Age: 54
End: 2024-06-11

## 2024-06-11 NOTE — TELEPHONE ENCOUNTER
Patient phoned and left message for this RN stating that he is canceling his upcoming appointment with ED Ramírez as he was able to get PT script for armen through physiatrist.  Patient requested call back to confirm canceled appointment.      This RN phoned patient back to inform him that this RN canceled his appointment.  This RN encouraged patient to reach out should he ever have a need.  Patient stated great appreciation for the office and the whole team.  This RN thanked the patient for his kind words.

## 2024-06-12 NOTE — PROGRESS NOTES
Daily Note     Today's date: 2024  Patient name: Gallo Gallegos  : 1970  MRN: [id number]  Referring provider: Alexi Ruiz MD  Dx:   Encounter Diagnosis     ICD-10-CM    1. Lumbar spondylosis  M47.816       2. Intervertebral disc disorder with radiculopathy of lumbar region  M51.16                      Subjective: Pt reports no adverse effects following last session.       Objective: See treatment diary below      Assessment: Tolerated treatment well. Patient exhibited good technique with therapeutic exercises and would benefit from continued PT.  No adverse effects noted w/ TE performed. Good tolerance to STM. Pt continues to benefit from MH to conclude.      Plan: Continue per plan of care. Resume laser nv.      Precautions: H/O Spinal cord injury, Cardiomyopathy, Lumbar radiculopathy, CHF, Cervical fusion, Cardiac surgery    1:1 10:27-11:05am  Manuals 6/10 6/13  5/16 5/20 5/23 5/29 5/31 6/3 6/6   CPA of Lumbar spine Gr II+ III CPA STM (LM)  Gr II +III CPA Gr II +III CPA Gr II +III CPA Gr II +III CPA  STM (LM) Gr II+ III CPA   R hip extension              IASTM using LLL 22Ws 5' R paraspinals NV  22 Ws 5' R paraspinals 22 Ws 5' R paraspinals 22 Ws 5' R paraspinals 22 Ws 5' R paraspinals  22Ws 5' R paraspinals 22Ws 5' R paraspinals                Neuro Re-Ed                          Bridge 10x x10  10x 10x  10x 10x  x10 10x   TrA March             TrA Clamshell             TrA kickouts 10x on ea 10 ea   10x on ea 10x on ea 10x on ea 10x on ea  10x on ea 10x on ea   Quad LE extension             L sciatic nerve glides             R femoral nerve glide              TrA hold             Standing hip extension 10x on ea YTB w/ TrA 10x ea YTB w/ TrA  5x on ea ytb with TrA 5x on ea ytb with TrA 5x on ea ytb with TrA   5x on ea YTB w/ TrA 10x on ea YTB w/ TrA   Standing hip abduction 10x on ea YTB w/ TrA 10x on ea YTB w/ TrA  5x on ea ytb with TrA 5x on ea ytb with TrA 5x on ea ytb with TrA    5x on ea YTB w/  "TrA 10x on ea YTB w/ TrA   LAT sweep with TrA       10x rtb       Ther Ex             Elliptical 5' lv 6-15 5' lv 6-15  5' Lvl 6-15 5' Lvl 6-15 5' lvl 6-15 5' lvl 6-15 5' lvl 6-15 5' lv 6-15 5' lv 6-15   Piriformis stretch              RE_EVALUATION             Press up Against table 10x5\" standing Against table 10x5\" standing  Against [id number] 5\" standing Against [id number] 5\" standing Against [id number] 5\" standing Against cfhfj05e 5\" standing  Against table 10x5\" standing Against table 8x5\" standing   LTR 10x B 10x B  10x B 10x B 10x B 10x B  10x B    R hip flexor stretch                                       Ther Activity             TrA standing March 10x on ea 10x on ea   10x on ea 10x on ea 10x on ea  10x on ea 10x on ea   Anti-rotation press 10x on ea RTB- cueing for posture 10x on ea RTB-cueing for posture  10x on ea rtb -cueing for posture 10x on ea rtb -cueing for posture 10x on ea rtb -cueing for posture  10x on ea rtb -cueing for posture   10x on ea RTB- cueing for posture 10x on ea RTB- cueing for posture   Education                                       Modalities              Seated x10' Seated 10'  Seated x10' Seated x10' Seated x10' Defers    Seated x10'                [employer] [facility]  Access Code: [email or web address]                                     "

## 2024-06-13 ENCOUNTER — OFFICE VISIT (OUTPATIENT)
Dept: PHYSICAL THERAPY | Facility: CLINIC | Age: 54
End: 2024-06-13
Payer: COMMERCIAL

## 2024-06-13 DIAGNOSIS — M47.816 LUMBAR SPONDYLOSIS: Primary | ICD-10-CM

## 2024-06-13 DIAGNOSIS — M51.16 INTERVERTEBRAL DISC DISORDER WITH RADICULOPATHY OF LUMBAR REGION: ICD-10-CM

## 2024-06-13 PROCEDURE — 97110 THERAPEUTIC EXERCISES: CPT

## 2024-06-13 PROCEDURE — 97530 THERAPEUTIC ACTIVITIES: CPT

## 2024-06-13 PROCEDURE — 97112 NEUROMUSCULAR REEDUCATION: CPT

## 2024-06-18 ENCOUNTER — EVALUATION (OUTPATIENT)
Dept: PHYSICAL THERAPY | Facility: CLINIC | Age: 54
End: 2024-06-18
Payer: COMMERCIAL

## 2024-06-18 DIAGNOSIS — G89.29 CHRONIC NECK PAIN: Primary | ICD-10-CM

## 2024-06-18 DIAGNOSIS — M54.6 CHRONIC THORACIC SPINE PAIN: ICD-10-CM

## 2024-06-18 DIAGNOSIS — G89.29 CHRONIC THORACIC SPINE PAIN: ICD-10-CM

## 2024-06-18 DIAGNOSIS — Z98.1 HX OF FUSION OF CERVICAL SPINE: ICD-10-CM

## 2024-06-18 DIAGNOSIS — M54.2 CHRONIC NECK PAIN: Primary | ICD-10-CM

## 2024-06-18 PROCEDURE — 97162 PT EVAL MOD COMPLEX 30 MIN: CPT | Performed by: PHYSICAL THERAPIST

## 2024-06-18 PROCEDURE — 97110 THERAPEUTIC EXERCISES: CPT | Performed by: PHYSICAL THERAPIST
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
## 2024-06-20 ENCOUNTER — OFFICE VISIT (OUTPATIENT)
Dept: PHYSICAL THERAPY | Facility: CLINIC | Age: 54
End: 2024-06-20
Payer: COMMERCIAL

## 2024-06-20 DIAGNOSIS — M54.6 CHRONIC THORACIC SPINE PAIN: Primary | ICD-10-CM

## 2024-06-20 DIAGNOSIS — Z98.1 HX OF FUSION OF CERVICAL SPINE: ICD-10-CM

## 2024-06-20 DIAGNOSIS — G89.29 CHRONIC NECK PAIN: ICD-10-CM

## 2024-06-20 DIAGNOSIS — G89.29 CHRONIC THORACIC SPINE PAIN: Primary | ICD-10-CM

## 2024-06-20 DIAGNOSIS — M54.2 CHRONIC NECK PAIN: ICD-10-CM

## 2024-06-20 PROCEDURE — 97140 MANUAL THERAPY 1/> REGIONS: CPT | Performed by: PHYSICAL THERAPIST

## 2024-06-20 PROCEDURE — 97110 THERAPEUTIC EXERCISES: CPT | Performed by: PHYSICAL THERAPIST

## 2024-06-20 PROCEDURE — 97112 NEUROMUSCULAR REEDUCATION: CPT | Performed by: PHYSICAL THERAPIST

## 2024-06-20 NOTE — PROGRESS NOTES
"Daily Note     Today's date: 2024  Patient name: Gallo Gallegos  : 1970  MRN: [id number]  Referring provider: Alexi Ruiz MD  Dx:   Encounter Diagnosis     ICD-10-CM    1. Chronic thoracic spine pain  M54.6     G89.29       2. Chronic neck pain  M54.2     G89.29       3. Hx of fusion of cervical spine  Z98.1                      Subjective: Pt offers no new complaints this afternoon.       Objective: See treatment diary below      Assessment: Implemented treatment on primary movement impairments identified at initial evaluation. Tolerated treatment well. No adverse soreness or discomfort with manual intervention of exercise. Patient would benefit from continued PT.       Plan: Continue per plan of care.      Precautions: H/O Spinal cord injury, Cardiomyopathy, Lumbar radiculopathy, CHF, Cervical fusion, Cardiac surgery      Manuals             OA mobs             AA mobs             T/s CPA  Gr II            Gentle traction with SOR 6'            Neuro Re-Ed             Cervical retraction             Scapular retraction Prone scap retraction 10x3\"            TB rows             TB extension                                                    Ther Ex             UBE 2'/2'            UT stretch             LS stretch             Self OA mobilizations 10x on ea for 3\"                                                                Ther Activity                                       Gait Training                                       Modalities              10'                         1:1 with PT from 245-323pm     "

## 2024-06-24 ENCOUNTER — OFFICE VISIT (OUTPATIENT)
Dept: PHYSICAL THERAPY | Facility: CLINIC | Age: 54
End: 2024-06-24
Payer: COMMERCIAL

## 2024-06-24 DIAGNOSIS — M54.6 CHRONIC THORACIC SPINE PAIN: Primary | ICD-10-CM

## 2024-06-24 DIAGNOSIS — G89.29 CHRONIC THORACIC SPINE PAIN: Primary | ICD-10-CM

## 2024-06-24 DIAGNOSIS — Z98.1 HX OF FUSION OF CERVICAL SPINE: ICD-10-CM

## 2024-06-24 DIAGNOSIS — M54.2 CHRONIC NECK PAIN: ICD-10-CM

## 2024-06-24 DIAGNOSIS — G89.29 CHRONIC NECK PAIN: ICD-10-CM

## 2024-06-24 PROCEDURE — 97110 THERAPEUTIC EXERCISES: CPT

## 2024-06-24 PROCEDURE — 97112 NEUROMUSCULAR REEDUCATION: CPT

## 2024-06-24 NOTE — PROGRESS NOTES
"Daily Note     Today's date: 2024  Patient name: Gallo Gallegos  : 1970  MRN: [id number]  Referring provider: Kirit Magallanes MD  Dx:   Encounter Diagnosis     ICD-10-CM    1. Chronic thoracic spine pain  M54.6     G89.29       2. Chronic neck pain  M54.2     G89.29       3. Hx of fusion of cervical spine  Z98.1                      Subjective: Pt reports no adverse effects following last session.       Objective: See treatment diary below      Assessment: Tolerated treatment well. Patient exhibited good technique with therapeutic exercises and would benefit from continued PT. Decreased flexibility noted w/ UT stretch to L. Good tolerance to manual techniques, as well as addition of TB rows, extensions; no adverse effects noted.  Pt continues to benefit from  to conclude.      Plan: Progress treatment as tolerated.       Precautions: H/O Spinal cord injury, Cardiomyopathy, Lumbar radiculopathy, CHF, Cervical fusion, Cardiac surgery    1:1 3:28-4:00pm  Manuals            OA mobs             AA mobs             T/s CPA  Gr II LM (STM)           Gentle traction with SOR 6' LM           Neuro Re-Ed             Cervical retraction             Scapular retraction Prone scap retraction 10x3\" Prone scap retraction 10x3\"           TB rows  RTB 2x10           TB extension  RTB 2x10                                                  Ther Ex             UBE 2'/2' 2'/2'           UT stretch  5x10\" ea           LS stretch             Self OA mobilizations 10x on ea for 3\" 10x on ea for 3\"                                                               Ther Activity                                       Gait Training                                       Modalities              10' 10'                               "

## 2024-06-25 ENCOUNTER — CONSULT (OUTPATIENT)
Dept: NEUROSURGERY | Facility: CLINIC | Age: 54
End: 2024-06-25
Payer: MEDICARE

## 2024-06-25 VITALS
WEIGHT: 205 LBS | RESPIRATION RATE: 18 BRPM | HEIGHT: 69 IN | BODY MASS INDEX: 30.36 KG/M2 | OXYGEN SATURATION: 99 % | DIASTOLIC BLOOD PRESSURE: 80 MMHG | SYSTOLIC BLOOD PRESSURE: 118 MMHG | TEMPERATURE: 97.9 F | HEART RATE: 60 BPM

## 2024-06-25 DIAGNOSIS — G56.00 CARPAL TUNNEL SYNDROME: ICD-10-CM

## 2024-06-25 PROCEDURE — 99213 OFFICE O/P EST LOW 20 MIN: CPT | Performed by: NEUROLOGICAL SURGERY

## 2024-06-25 RX ORDER — CEFAZOLIN SODIUM 2 G/50ML
2000 SOLUTION INTRAVENOUS ONCE
OUTPATIENT
Start: 2024-06-25 | End: 2024-06-25

## 2024-06-25 RX ORDER — CHLORHEXIDINE GLUCONATE ORAL RINSE 1.2 MG/ML
15 SOLUTION DENTAL ONCE
OUTPATIENT
Start: 2024-06-25 | End: 2024-06-25
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
## 2024-06-27 ENCOUNTER — OFFICE VISIT (OUTPATIENT)
Dept: PHYSICAL THERAPY | Facility: CLINIC | Age: 54
End: 2024-06-27
Payer: COMMERCIAL

## 2024-06-27 DIAGNOSIS — G89.29 CHRONIC THORACIC SPINE PAIN: Primary | ICD-10-CM

## 2024-06-27 DIAGNOSIS — M54.2 CHRONIC NECK PAIN: ICD-10-CM

## 2024-06-27 DIAGNOSIS — Z98.1 HX OF FUSION OF CERVICAL SPINE: ICD-10-CM

## 2024-06-27 DIAGNOSIS — M54.6 CHRONIC THORACIC SPINE PAIN: Primary | ICD-10-CM

## 2024-06-27 DIAGNOSIS — G89.29 CHRONIC NECK PAIN: ICD-10-CM

## 2024-06-27 PROCEDURE — 97140 MANUAL THERAPY 1/> REGIONS: CPT | Performed by: PHYSICAL THERAPIST

## 2024-06-27 PROCEDURE — 97112 NEUROMUSCULAR REEDUCATION: CPT | Performed by: PHYSICAL THERAPIST

## 2024-06-27 NOTE — PROGRESS NOTES
"Daily Note     Today's date: 2024  Patient name: Gallo Gallegos  : 1970  MRN: [id number]  Referring provider: Alexi Ruiz MD  Dx:   Encounter Diagnosis     ICD-10-CM    1. Chronic thoracic spine pain  M54.6     G89.29       2. Hx of fusion of cervical spine  Z98.1       3. Chronic neck pain  M54.2     G89.29           Start Time: 1500  Stop Time: 1550  Total time in clinic (min): 50 minutes    Subjective: Pt reports that his plantar foot pain feel better while on the elliptical. He requests to do this for a warm up. He saw Dr. Chi this week who will be performing CTS surgery.       Objective: See treatment diary below      Assessment: Tolerated treatment well. Initiated levator stretch and thoracic extension. Mild discomfort present in mid thoracic spine with new exercises. Patient exhibited good technique with therapeutic exercises and would benefit from continued PT.       Plan: Continue per plan of care.      Precautions: H/O Spinal cord injury, Cardiomyopathy, Lumbar radiculopathy, CHF, Cervical fusion, Cardiac surgery    1:1 3:28-4:00pm  Manuals           OA mobs             AA mobs             T/s CPA  Gr II LM (STM) Gr II          Gentle traction with SOR 6' LM 6'          Neuro Re-Ed             Cervical retraction             Scapular retraction Prone scap retraction 10x3\" Prone scap retraction 10x3\" seated scap retraction 10x3\"          TB rows  RTB 2x10 RTB 2x10          TB extension  RTB 2x10 RTB 2x10                                                 Ther Ex             UBE 2'/2' 2'/2'           UT stretch  5x10\" ea           LS stretch   5x10\" B          Self OA mobilizations 10x on ea for 3\" 10x on ea for 3\" 10x on ea for 3\"          Elliptical    5' Lvl 10-12          Thoracic extension seated   Foam roller 10x5\"                                    Ther Activity                                       Gait Training                                       Modalities           "    10' 10' 10'                         1:1 with PT from 303-330pm

## 2024-07-01 ENCOUNTER — OFFICE VISIT (OUTPATIENT)
Dept: PHYSICAL THERAPY | Facility: CLINIC | Age: 54
End: 2024-07-01
Payer: COMMERCIAL

## 2024-07-01 DIAGNOSIS — M54.2 CHRONIC NECK PAIN: ICD-10-CM

## 2024-07-01 DIAGNOSIS — M54.6 CHRONIC THORACIC SPINE PAIN: Primary | ICD-10-CM

## 2024-07-01 DIAGNOSIS — G89.29 CHRONIC THORACIC SPINE PAIN: Primary | ICD-10-CM

## 2024-07-01 DIAGNOSIS — G89.29 CHRONIC NECK PAIN: ICD-10-CM

## 2024-07-01 DIAGNOSIS — Z98.1 HX OF FUSION OF CERVICAL SPINE: ICD-10-CM

## 2024-07-01 PROCEDURE — 97110 THERAPEUTIC EXERCISES: CPT | Performed by: PHYSICAL THERAPIST

## 2024-07-01 PROCEDURE — 97140 MANUAL THERAPY 1/> REGIONS: CPT | Performed by: PHYSICAL THERAPIST

## 2024-07-01 PROCEDURE — 97112 NEUROMUSCULAR REEDUCATION: CPT | Performed by: PHYSICAL THERAPIST

## 2024-07-01 NOTE — PROGRESS NOTES
Pt returned to the office 5 minutes after he left to show me the amount of involuntary movement that was occurring at his right arm. This was observed in prior sessions; however, significantly more pronounced. HR measured at 62 bpm and SpO2 at 98%. Right arm had full motion in all planes and appropriate strength. However, when resting, involuntary movements were occurring. I questioned whether or not patient took his medication, specifically baclofen, which he verbally notes he did. Pt offered moist heat to R shoulder for a few minutes to see if symptoms would subside. Involuntary movements seemed  only to be present with rest and not during movement. Pt should get this evaluated should symptoms worsen or not improve. The only exercise we progressed today was green theraband with rows and extensions. No new exercise completed.

## 2024-07-01 NOTE — PROGRESS NOTES
"Daily Note     Today's date: 2024  Patient name: Gallo Gallegos  : 1970  MRN: [id number]  Referring provider: Alexi Ruiz MD  Dx:   Encounter Diagnosis     ICD-10-CM    1. Chronic thoracic spine pain  M54.6     G89.29       2. Hx of fusion of cervical spine  Z98.1       3. Chronic neck pain  M54.2     G89.29                      Subjective: Pt reports no new complaints this morning.       Objective: See treatment diary below      Assessment: Tolerated treatment well. Pt started to fatigue after 7 reps of OA self mobs on the right side. Increased shoulder extension and rows to green TB for progress. No adverse effects of progression. Patient exhibited good technique with therapeutic exercises and would benefit from continued PT.       Plan: Continue per plan of care.      Precautions: H/O Spinal cord injury, Cardiomyopathy, Lumbar radiculopathy, CHF, Cervical fusion, Cardiac surgery    1:1 3:28-4:00pm  Manuals          OA mobs             AA mobs             T/s CPA  Gr II LM (STM) Gr II Gr II+III         Gentle traction with SOR 6' LM 6' 6'         Neuro Re-Ed             Cervical retraction             Scapular retraction Prone scap retraction 10x3\" Prone scap retraction 10x3\" seated scap retraction 10x3\" seated scap retraction 10x3\"         TB rows  RTB 2x10 RTB 2x10 GTB 2x10         TB extension  RTB 2x10 RTB 2x10 GTB 2x10          Inclined bench T,Y,W    Next visit                                    Ther Ex             UBE 2'/2' 2'/2'           UT stretch  5x10\" ea  5x10\" ea         LS stretch   5x10\" B 5x10\" B         Self OA mobilizations 10x on ea for 3\" 10x on ea for 3\" 10x on ea for 3\" 7 on R 10 on left x 5\"         Elliptical    5' Lvl 10-12 5' Lvl 10-12         Thoracic extension seated   Foam roller 10x5\" Foam roller 10x5\"                                   Ther Activity             Universal low row    nv         Universal high row    nv         Gait Training                 "                       [facility] 10' 10' 10' 10'                        1:1 with PT from 8815-7523g

## 2024-07-02 ENCOUNTER — APPOINTMENT (OUTPATIENT)
Dept: PHYSICAL THERAPY | Facility: CLINIC | Age: 54
End: 2024-07-02
Payer: COMMERCIAL

## 2024-07-03 ENCOUNTER — TELEPHONE (OUTPATIENT)
Dept: NEUROSURGERY | Facility: CLINIC | Age: 54
End: 2024-07-03

## 2024-07-03 ENCOUNTER — TELEPHONE (OUTPATIENT)
Age: 54
End: 2024-07-03

## 2024-07-03 NOTE — TELEPHONE ENCOUNTER
7/3/24 - CALLED FAB[employer] AT [employer] AND LEFT VM TO VERIFY IF CLAIM OPEN AND BILLABLE.    CLAIM #: [id number]  DOI: 5/22/12  [employer]  [address] [place] [address]    FABBY CHELSTED # [phone]

## 2024-07-03 NOTE — TELEPHONE ENCOUNTER
Caller: Gallo Gallegos    Doctor/Office: Dr. Alonzo    CB#: [phone]      What needs to be faxed: Approval from Dr. Ruiz for carpal tunnel surgery scheduled on 8/2/2023    ATTN to: Dr. Dilan Bassett    Fax#: [phone]      Patient would like a call back when clearance has been faxed/completed

## 2024-07-08 ENCOUNTER — OFFICE VISIT (OUTPATIENT)
Dept: PHYSICAL THERAPY | Facility: CLINIC | Age: 54
End: 2024-07-08
Payer: COMMERCIAL

## 2024-07-08 DIAGNOSIS — M54.2 CHRONIC NECK PAIN: ICD-10-CM

## 2024-07-08 DIAGNOSIS — G89.29 CHRONIC THORACIC SPINE PAIN: Primary | ICD-10-CM

## 2024-07-08 DIAGNOSIS — M54.6 CHRONIC THORACIC SPINE PAIN: Primary | ICD-10-CM

## 2024-07-08 DIAGNOSIS — Z98.1 HX OF FUSION OF CERVICAL SPINE: ICD-10-CM

## 2024-07-08 DIAGNOSIS — G89.29 CHRONIC NECK PAIN: ICD-10-CM

## 2024-07-08 PROCEDURE — 97140 MANUAL THERAPY 1/> REGIONS: CPT | Performed by: PHYSICAL THERAPIST

## 2024-07-08 PROCEDURE — 97110 THERAPEUTIC EXERCISES: CPT | Performed by: PHYSICAL THERAPIST

## 2024-07-08 PROCEDURE — 97112 NEUROMUSCULAR REEDUCATION: CPT | Performed by: PHYSICAL THERAPIST

## 2024-07-08 NOTE — PROGRESS NOTES
"Daily Note     Today's date: 2024  Patient name: Gallo Gallegos  : 1970  MRN: [id number]  Referring provider: Alexi Ruiz MD  Dx:   Encounter Diagnosis     ICD-10-CM    1. Chronic thoracic spine pain  M54.6     G89.29       2. Hx of fusion of cervical spine  Z98.1       3. Chronic neck pain  M54.2     G89.29                      Subjective: Pt experienced involuntary movement after last session that lasted into the next day. Pt took his baclofen as prescribed which eventually subsided involuntary movements.       Objective: See treatment diary below      Assessment: One plausible theory that increased his involuntary movements could be linked neurophysiological effects that occurred with manual therapy and exercise. He did not experience the degree of involuntary movements as last session. Focused on STM of the upper thoracic and cervical parapsinals today. Continued with thoracic mobility, cervical flexibility, and scap strengthening.  Tolerated treatment well. Patient would benefit from continued PT      Plan: Continue per plan of care.      Precautions: H/O Spinal cord injury, Cardiomyopathy, Lumbar radiculopathy, CHF, Cervical fusion, Cardiac surgery    Manuals         OA mobs             AA mobs             T/s CPA  Gr II LM (STM) Gr II Gr II+III STM only to UT and upper thoracic region        Gentle traction with SOR 6' LM 6' 6' 6'        Neuro Re-Ed             Cervical retraction             Scapular retraction Prone scap retraction 10x3\" Prone scap retraction 10x3\" seated scap retraction 10x3\" seated scap retraction 10x3\" seated scap retraction 10x3\"        TB rows  RTB 2x10 RTB 2x10 GTB 2x10 RTB 2x10        TB extension  RTB 2x10 RTB 2x10 GTB 2x10  RTB 2x10        Inclined bench T,Y,W    Next visit                                    Ther Ex             UBE 2'/2' 2'/2'           UT stretch  5x10\" ea  5x10\" ea 5x10\" B        LS stretch   5x10\" B 5x10\" B 5x10\" B        Self " "OA mobilizations 10x on ea for 3\" 10x on ea for 3\" 10x on ea for 3\" 7 on R 10 on left x 5\"         Elliptical    5' Lvl 10-12 5' Lvl 10-12 5' Lvl 10-12        Thoracic extension seated   Foam roller 10x5\" Foam roller 10x5\" Foam roller 10x5\"                                  Ther Activity             Universal low row    nv         Universal high row    nv         Gait Training                                       Modalities              10' 10' 10' 10' 10' utilized strap to hold                        1:1 with PT from 3-520x             "

## 2024-07-11 ENCOUNTER — OFFICE VISIT (OUTPATIENT)
Dept: PHYSICAL THERAPY | Facility: CLINIC | Age: 54
End: 2024-07-11
Payer: COMMERCIAL

## 2024-07-11 DIAGNOSIS — M54.6 CHRONIC THORACIC SPINE PAIN: Primary | ICD-10-CM

## 2024-07-11 DIAGNOSIS — G89.29 CHRONIC THORACIC SPINE PAIN: Primary | ICD-10-CM

## 2024-07-11 DIAGNOSIS — G89.29 CHRONIC NECK PAIN: ICD-10-CM

## 2024-07-11 DIAGNOSIS — Z98.1 HX OF FUSION OF CERVICAL SPINE: ICD-10-CM

## 2024-07-11 DIAGNOSIS — M54.2 CHRONIC NECK PAIN: ICD-10-CM

## 2024-07-11 PROCEDURE — 97140 MANUAL THERAPY 1/> REGIONS: CPT | Performed by: PHYSICAL THERAPIST

## 2024-07-11 PROCEDURE — 97110 THERAPEUTIC EXERCISES: CPT | Performed by: PHYSICAL THERAPIST

## 2024-07-11 NOTE — PROGRESS NOTES
"Daily Note     Today's date: 2024  Patient name: Gallo Gallegos  : 1970  MRN: [id number]  Referring provider: Alexi Ruiz MD  Dx:   Encounter Diagnosis     ICD-10-CM    1. Chronic thoracic spine pain  M54.6     G89.29       2. Hx of fusion of cervical spine  Z98.1       3. Chronic neck pain  M54.2     G89.29           Start Time: 0900  Stop Time: 1000  Total time in clinic (min): 60 minutes    Subjective: Pt offers no new complaints this morning. Overall, he is doing fine from last treatment session.       Objective: See treatment diary below      Assessment: Tolerated treatment well. Initiated cervical proprioception with laser. Pt challenged and fatigued with biofeedback using laser. Patient exhibited good technique with therapeutic exercises and would benefit from continued PT.       Plan: Continue per plan of care.      Precautions: H/O Spinal cord injury, Cardiomyopathy, Lumbar radiculopathy, CHF, Cervical fusion, Cardiac surgery    Manuals        OA mobs             AA mobs             T/s CPA  Gr II LM (STM) Gr II Gr II+III STM only to UT and upper thoracic region STM only to UT and upper thoracic region       Gentle traction with SOR 6' LM 6' 6' 6' 5'       Neuro Re-Ed             Cervical retraction             Scapular retraction Prone scap retraction 10x3\" Prone scap retraction 10x3\" seated scap retraction 10x3\" seated scap retraction 10x3\" seated scap retraction 10x3\" seated scap retraction 10x3\"       TB rows  RTB 2x10 RTB 2x10 GTB 2x10 RTB 2x10 RTB 2x10       TB extension  RTB 2x10 RTB 2x10 GTB 2x10  RTB 2x10 RTB 2x10       Inclined bench T,Y,W    Next visit          Cervical Laser       Maze x 2                    Ther Ex             UBE 2'/2' 2'/2'           UT stretch  5x10\" ea  5x10\" ea 5x10\" B 5x10\" B       LS stretch   5x10\" B 5x10\" B 5x10\" B        Self OA mobilizations 10x on ea for 3\" 10x on ea for 3\" 10x on ea for 3\" 7 on R 10 on left x 5\"       " "  Elliptical    5' Lvl 10-12 5' Lvl 10-12 5' Lvl 10-12 5' Lvl 10-12       Thoracic extension seated   Foam roller 10x5\" Foam roller 10x5\" Foam roller 10x5\" Foam roller 10x5\"                                 Ther Activity             Universal low row    nv         Universal high row    nv         Gait Training                                       Modalities             MH 10' 10' 10' 10' 10' utilized strap to hold MH 10' utilized strap to hold MH                      1:1 with PT from 7-623a               "

## 2024-07-15 ENCOUNTER — OFFICE VISIT (OUTPATIENT)
Dept: PHYSICAL THERAPY | Facility: CLINIC | Age: 54
End: 2024-07-15
Payer: COMMERCIAL

## 2024-07-15 DIAGNOSIS — G89.29 CHRONIC THORACIC SPINE PAIN: Primary | ICD-10-CM

## 2024-07-15 DIAGNOSIS — Z98.1 HX OF FUSION OF CERVICAL SPINE: ICD-10-CM

## 2024-07-15 DIAGNOSIS — M54.2 CHRONIC NECK PAIN: ICD-10-CM

## 2024-07-15 DIAGNOSIS — G89.29 CHRONIC NECK PAIN: ICD-10-CM

## 2024-07-15 DIAGNOSIS — M54.6 CHRONIC THORACIC SPINE PAIN: Primary | ICD-10-CM

## 2024-07-15 PROCEDURE — 97110 THERAPEUTIC EXERCISES: CPT | Performed by: PHYSICAL THERAPIST

## 2024-07-15 PROCEDURE — 97140 MANUAL THERAPY 1/> REGIONS: CPT | Performed by: PHYSICAL THERAPIST

## 2024-07-15 PROCEDURE — 97112 NEUROMUSCULAR REEDUCATION: CPT | Performed by: PHYSICAL THERAPIST

## 2024-07-15 NOTE — PROGRESS NOTES
"Daily Note     Today's date: 7/15/2024  Patient name: Gallo Gallegos  : 1970  MRN: [id number]  Referring provider: Alexi Ruiz MD  Dx:   Encounter Diagnosis     ICD-10-CM    1. Chronic thoracic spine pain  M54.6     G89.29       2. Hx of fusion of cervical spine  Z98.1       3. Chronic neck pain  M54.2     G89.29                      Subjective: Pt offers no new complaints this morning pertaining to the neck. Pt still dealing with R plantar heel pain which he is wearing a brace for.        Objective: See treatment diary below      Assessment: Tolerated treatment well. Pt challenged with cervical proprioception. Good compliance with HEP. No adverse effects of mobilizations or stability exercises.  Patient would benefit from continued PT.       Plan: Continue per plan of care.      Precautions: H/O Spinal cord injury, Cardiomyopathy, Lumbar radiculopathy, CHF, Cervical fusion, Cardiac surgery    Manuals 6/20 6/24 6/27 7/1 7/8 7/11 7/15      OA mobs             AA mobs             T/s CPA  Gr II LM (STM) Gr II Gr II+III STM only to UT and upper thoracic region STM only to UT and upper thoracic region STM only to UT and upper thoracic region      Gentle traction with SOR 6' LM 6' 6' 6' 5' 5'      Neuro Re-Ed             Cervical retraction             Scapular retraction Prone scap retraction 10x3\" Prone scap retraction 10x3\" seated scap retraction 10x3\" seated scap retraction 10x3\" seated scap retraction 10x3\" seated scap retraction 10x3\" seated scap retraction 10x3\"      TB rows  RTB 2x10 RTB 2x10 GTB 2x10 RTB 2x10 RTB 2x10 RTB 2x10      TB extension  RTB 2x10 RTB 2x10 GTB 2x10  RTB 2x10 RTB 2x10 RTB 2x10      Inclined bench T,Y,W    Next visit          Cervical Laser       Maze x 2 Maze x 2                   Ther Ex             UBE 2'/2' 2'/2'           UT stretch  5x10\" ea  5x10\" ea 5x10\" B 5x10\" B       LS stretch   5x10\" B 5x10\" B 5x10\" B        Self OA mobilizations 10x on ea for 3\" 10x on ea for 3\" 10x " "on ea for 3\" 7 on R 10 on left x 5\"         Elliptical    5' Lvl 10-12 5' Lvl 10-12 5' Lvl 10-12 5' Lvl 10-12 5' Lvl 10-12      Thoracic extension seated   Foam roller 10x5\" Foam roller 10x5\" Foam roller 10x5\" Foam roller 10x5\" Foam roller 10x5\"                                Ther Activity             Universal low row    nv         Universal high row    nv         Gait Training                                       Modalities             MH 10' 10' 10' 10' 10' utilized strap to hold MH 10' utilized strap to hold MH 10' utilized strap to hold MH                     1:1 with PT from 4-419t                 "

## 2024-07-16 RX ORDER — SODIUM CHLORIDE 9 MG/ML
125 INJECTION, SOLUTION INTRAVENOUS CONTINUOUS
Status: CANCELLED | OUTPATIENT
Start: 2024-07-16

## 2024-07-17 ENCOUNTER — OFFICE VISIT (OUTPATIENT)
Dept: PHYSICAL THERAPY | Facility: CLINIC | Age: 54
End: 2024-07-17
Payer: COMMERCIAL

## 2024-07-17 DIAGNOSIS — G89.29 CHRONIC THORACIC SPINE PAIN: Primary | ICD-10-CM

## 2024-07-17 DIAGNOSIS — M54.2 CHRONIC NECK PAIN: ICD-10-CM

## 2024-07-17 DIAGNOSIS — Z98.1 HX OF FUSION OF CERVICAL SPINE: ICD-10-CM

## 2024-07-17 DIAGNOSIS — M54.6 CHRONIC THORACIC SPINE PAIN: Primary | ICD-10-CM

## 2024-07-17 DIAGNOSIS — G89.29 CHRONIC NECK PAIN: ICD-10-CM

## 2024-07-17 PROCEDURE — 97110 THERAPEUTIC EXERCISES: CPT | Performed by: PHYSICAL THERAPIST

## 2024-07-17 PROCEDURE — 97140 MANUAL THERAPY 1/> REGIONS: CPT | Performed by: PHYSICAL THERAPIST

## 2024-07-17 PROCEDURE — 97112 NEUROMUSCULAR REEDUCATION: CPT | Performed by: PHYSICAL THERAPIST

## 2024-07-17 NOTE — PROGRESS NOTES
"Daily Note     Today's date: 2024  Patient name: Gallo Gallegos  : 1970  MRN: [id number]  Referring provider: Alexi Ruiz MD  Dx:   Encounter Diagnosis     ICD-10-CM    1. Chronic thoracic spine pain  M54.6     G89.29       2. Hx of fusion of cervical spine  Z98.1       3. Chronic neck pain  M54.2     G89.29                      Subjective: Pt offers no new complaints at cervicothoracic region. Still dealing with PF heel pain in R foot. Pt has CT release scheduled the first week of August, which will limit his ability to participate in PT for his UE. Due to ongoing heel pain, I will evaluate this region next week to help subside symptoms.       Objective: See treatment diary below      Assessment: Tolerated treatment well. Increased scapular strengthening to 30# without adverse effects. Patient exhibited good technique with therapeutic exercises. Cervical proprioception is improving using laser. As he fatigues, the laser accuracy reduces. Pt will be re-evaluated next session. We will also briefly look at his plantar heel pain secondary to 4 month history of pain.       Plan: Continue per plan of care.      Precautions: H/O Spinal cord injury, Cardiomyopathy, Lumbar radiculopathy, CHF, Cervical fusion, Cardiac surgery    Manuals 6/20 6/24 6/27 7/1 7/8 7/11 7/15 7/17     OA mobs             AA mobs             T/s CPA  Gr II LM (STM) Gr II Gr II+III STM only to UT and upper thoracic region STM only to UT and upper thoracic region STM only to UT and upper thoracic region STM only to UT and upper thoracic region     Gentle traction with SOR 6' LM 6' 6' 6' 5' 5' 5'      Neuro Re-Ed             Cervical retraction             Scapular retraction Prone scap retraction 10x3\" Prone scap retraction 10x3\" seated scap retraction 10x3\" seated scap retraction 10x3\" seated scap retraction 10x3\" seated scap retraction 10x3\" seated scap retraction 10x3\" seated scap retraction 10x3\"     TB rows  RTB 2x10 RTB 2x10 GTB " "2x10 RTB 2x10 RTB 2x10 RTB 2x10 RTB 2x10     TB extension  RTB 2x10 RTB 2x10 GTB 2x10  RTB 2x10 RTB 2x10 RTB 2x10 RTB 2x10     Inclined bench T,Y,W    Next visit          Cervical Laser       Maze x 2 Maze x 2                   Ther Ex             UBE 2'/2' 2'/2'           UT stretch  5x10\" ea  5x10\" ea 5x10\" B 5x10\" B  5x10\" B     LS stretch   5x10\" B 5x10\" B 5x10\" B        Self OA mobilizations 10x on ea for 3\" 10x on ea for 3\" 10x on ea for 3\" 7 on R 10 on left x 5\"         Elliptical    5' Lvl 10-12 5' Lvl 10-12 5' Lvl 10-12 5' Lvl 10-12 5' Lvl 10-12 5' Lvl 10-12     Thoracic extension seated   Foam roller 10x5\" Foam roller 10x5\" Foam roller 10x5\" Foam roller 10x5\" Foam roller 10x5\" Foam roller 10x5\"                               Ther Activity             Universal low row    nv         Universal high row    nv         Gait Training                                       Modalities             MH 10' 10' 10' 10' 10' utilized strap to hold MH 10' utilized strap to hold MH 10' utilized strap to hold MH 10' utilized strap to hold MH                    1:1 with PT from 8-089g                   "
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
## 2024-07-18 ENCOUNTER — HOSPITAL ENCOUNTER (OUTPATIENT)
Dept: GASTROENTEROLOGY | Facility: HOSPITAL | Age: 54
Setting detail: OUTPATIENT SURGERY
End: 2024-07-18
Attending: INTERNAL MEDICINE
Payer: MEDICARE

## 2024-07-18 ENCOUNTER — ANESTHESIA (OUTPATIENT)
Dept: GASTROENTEROLOGY | Facility: HOSPITAL | Age: 54
End: 2024-07-18

## 2024-07-18 ENCOUNTER — ANESTHESIA EVENT (OUTPATIENT)
Dept: GASTROENTEROLOGY | Facility: HOSPITAL | Age: 54
End: 2024-07-18

## 2024-07-18 VITALS
HEART RATE: 64 BPM | HEIGHT: 69 IN | WEIGHT: 205 LBS | DIASTOLIC BLOOD PRESSURE: 60 MMHG | BODY MASS INDEX: 30.36 KG/M2 | SYSTOLIC BLOOD PRESSURE: 109 MMHG | TEMPERATURE: 97.5 F | RESPIRATION RATE: 18 BRPM | OXYGEN SATURATION: 98 %

## 2024-07-18 DIAGNOSIS — Z80.0 FAMILY HISTORY OF ESOPHAGEAL CANCER: ICD-10-CM

## 2024-07-18 DIAGNOSIS — Z86.010 HISTORY OF COLON POLYPS: ICD-10-CM

## 2024-07-18 PROCEDURE — 45385 COLONOSCOPY W/LESION REMOVAL: CPT | Performed by: INTERNAL MEDICINE

## 2024-07-18 PROCEDURE — 88305 TISSUE EXAM BY PATHOLOGIST: CPT | Performed by: PATHOLOGY

## 2024-07-18 PROCEDURE — 43239 EGD BIOPSY SINGLE/MULTIPLE: CPT | Performed by: INTERNAL MEDICINE

## 2024-07-18 RX ORDER — PROPOFOL 10 MG/ML
INJECTION, EMULSION INTRAVENOUS AS NEEDED
Status: DISCONTINUED | OUTPATIENT
Start: 2024-07-18 | End: 2024-07-18

## 2024-07-18 RX ORDER — PHENYLEPHRINE HCL IN 0.9% NACL 1 MG/10 ML
SYRINGE (ML) INTRAVENOUS AS NEEDED
Status: DISCONTINUED | OUTPATIENT
Start: 2024-07-18 | End: 2024-07-18

## 2024-07-18 RX ORDER — FENTANYL CITRATE 50 UG/ML
INJECTION, SOLUTION INTRAMUSCULAR; INTRAVENOUS AS NEEDED
Status: DISCONTINUED | OUTPATIENT
Start: 2024-07-18 | End: 2024-07-18

## 2024-07-18 RX ORDER — SODIUM CHLORIDE 9 MG/ML
125 INJECTION, SOLUTION INTRAVENOUS CONTINUOUS
Status: DISCONTINUED | OUTPATIENT
Start: 2024-07-18 | End: 2024-07-22 | Stop reason: HOSPADM

## 2024-07-18 RX ORDER — PROPOFOL 10 MG/ML
INJECTION, EMULSION INTRAVENOUS CONTINUOUS PRN
Status: DISCONTINUED | OUTPATIENT
Start: 2024-07-18 | End: 2024-07-18

## 2024-07-18 RX ORDER — LIDOCAINE HYDROCHLORIDE 10 MG/ML
INJECTION, SOLUTION EPIDURAL; INFILTRATION; INTRACAUDAL; PERINEURAL AS NEEDED
Status: DISCONTINUED | OUTPATIENT
Start: 2024-07-18 | End: 2024-07-18

## 2024-07-18 RX ORDER — SODIUM CHLORIDE 9 MG/ML
INJECTION, SOLUTION INTRAVENOUS CONTINUOUS PRN
Status: DISCONTINUED | OUTPATIENT
Start: 2024-07-18 | End: 2024-07-18

## 2024-07-18 RX ADMIN — PROPOFOL 140 MCG/KG/MIN: 10 INJECTION, EMULSION INTRAVENOUS at 11:06

## 2024-07-18 RX ADMIN — FENTANYL CITRATE 25 MCG: 50 INJECTION, SOLUTION INTRAMUSCULAR; INTRAVENOUS at 10:59

## 2024-07-18 RX ADMIN — SODIUM CHLORIDE: 0.9 INJECTION, SOLUTION INTRAVENOUS at 10:43

## 2024-07-18 RX ADMIN — PROPOFOL 50 MG: 10 INJECTION, EMULSION INTRAVENOUS at 11:03

## 2024-07-18 RX ADMIN — PROPOFOL 50 MG: 10 INJECTION, EMULSION INTRAVENOUS at 11:01

## 2024-07-18 RX ADMIN — PROPOFOL 100 MG: 10 INJECTION, EMULSION INTRAVENOUS at 10:59

## 2024-07-18 RX ADMIN — Medication 100 MCG: at 11:27

## 2024-07-18 RX ADMIN — Medication 40 MG: at 11:10

## 2024-07-18 RX ADMIN — LIDOCAINE HYDROCHLORIDE 100 MG: 10 INJECTION, SOLUTION EPIDURAL; INFILTRATION; INTRACAUDAL; PERINEURAL at 10:59

## 2024-07-18 NOTE — ANESTHESIA PREPROCEDURE EVALUATION
Procedure:  COLONOSCOPY  EGD    Relevant Problems   CARDIO  02/2023    2D echo      Interpretation Summary       ·  Left Ventricle: Left ventricular cavity size is dilated. Wall thickness is normal. There is eccentric hypertrophy. The left ventricular ejection fraction is 35%. Systolic function is moderately reduced. Global longitudinal strain is reduced at 12%. Diastolic function is normal.  ·  The following segments are hypokinetic: basal inferoseptal and basal inferior.  ·  All other segments are normal.  ·  Right Ventricle: Right ventricular cavity size is dilated.  ·  Left Atrium: The atrium is mildly dilated.  ·  Right Atrium: The atrium is mildly dilated.     (+) Hyperlipidemia   (+) Premature ventricular contraction      MUSCULOSKELETAL   (+) Lower back pain   (+) Sacroiliitis (HCC)             Anesthesia Plan  ASA Score- 2     Anesthesia Type- IV sedation with anesthesia with ASA Monitors.         Additional Monitors:     Airway Plan:            Plan Factors-Exercise tolerance (METS): >4 METS.    Chart reviewed.   Existing labs reviewed. Patient summary reviewed.    Patient is not a current smoker.              Induction- intravenous.    Postoperative Plan-         Informed Consent- Anesthetic plan and risks discussed with patient.

## 2024-07-18 NOTE — ANESTHESIA POSTPROCEDURE EVALUATION
Post-Op Assessment Note    CV Status:  Stable  Pain Score: 0    Pain management: adequate       Mental Status:  Alert and awake   Hydration Status:  Euvolemic   PONV Controlled:  Controlled   Airway Patency:  Patent     Post Op Vitals Reviewed: Yes    No anethesia notable event occurred.    Staff: Anesthesiologist, CRNA   Comments: Report given to recovering RN,VSS, Pt states he is comfortable.            /55 (07/18/24 1135)    Temp 97.5 °F (36.4 °C) (07/18/24 1135)    Pulse 73 (07/18/24 1135)   Resp 21 (07/18/24 1135)    SpO2 96 % (07/18/24 1135)
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
## 2024-07-22 ENCOUNTER — EVALUATION (OUTPATIENT)
Dept: PHYSICAL THERAPY | Facility: CLINIC | Age: 54
End: 2024-07-22
Payer: COMMERCIAL

## 2024-07-22 DIAGNOSIS — M79.673 PAIN OF PLANTAR ASPECT OF HEEL: ICD-10-CM

## 2024-07-22 DIAGNOSIS — M54.6 CHRONIC THORACIC SPINE PAIN: Primary | ICD-10-CM

## 2024-07-22 DIAGNOSIS — M54.2 CHRONIC NECK PAIN: ICD-10-CM

## 2024-07-22 DIAGNOSIS — G89.29 CHRONIC NECK PAIN: ICD-10-CM

## 2024-07-22 DIAGNOSIS — G89.29 CHRONIC THORACIC SPINE PAIN: Primary | ICD-10-CM

## 2024-07-22 DIAGNOSIS — M79.671 PAIN OF RIGHT HEEL: ICD-10-CM

## 2024-07-22 DIAGNOSIS — Z98.1 HX OF FUSION OF CERVICAL SPINE: ICD-10-CM

## 2024-07-22 PROCEDURE — 88305 TISSUE EXAM BY PATHOLOGIST: CPT | Performed by: PATHOLOGY

## 2024-07-22 PROCEDURE — 97110 THERAPEUTIC EXERCISES: CPT | Performed by: PHYSICAL THERAPIST

## 2024-07-22 PROCEDURE — 97161 PT EVAL LOW COMPLEX 20 MIN: CPT | Performed by: PHYSICAL THERAPIST

## 2024-07-22 NOTE — LETTER
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 laboratory and imaging studies, as well as, emergency department course. Based upon that discussion, we've decided to admit Liliana Sloan for further observation and evaluation of Vernell Jurado's dyspnea. As I have deemed necessary from their history, physical, and studies, I have considered and evaluated Liliana Sloan for the following diagnoses:  ACUTE CORONARY SYNDROME, CHRONIC OBSTRUCTIVE PULMONARY DISEASE, CONGESTIVE HEART FAILURE, PERICARDIAL TAMPONADE, PNEUMONIA, PNEUMOTHORAX, PULMONARY EMBOLISM, SEPSIS, and THORACIC DISSECTION. FINAL IMPRESSION  1. Dyspnea on exertion    2. Congestive heart failure, unspecified HF chronicity, unspecified heart failure type ([facility] [address]    3. NICK (acute kidney injury) ([facility] [address]    4. Elevated troponin    5. Elevated brain natriuretic peptide (BNP) level    6. LBBB (left bundle branch block)    7. Leukocytosis, unspecified type    8. Anemia, unspecified type    9. Hypervolemia, unspecified hypervolemia type        Vitals:  Blood pressure 117/69, pulse 69, temperature 97.8 °F (36.6 °C), temperature source Oral, resp. rate 29, height 5' 7\" (1.702 m), weight 176 lb (79.8 kg), SpO2 92 %. Radiology  Xr Chest (2 Vw)    Result Date: 4/15/2021  Similar airways disease. EKG Interpretation. The Ekg interpreted by me in the absence of a cardiologist shows.   normal sinus rhythm with a rate of 62 with LBBB and first degree AV block present  Axis is   Normal  QTc is  511 ms  Intervals and Durations are unremarkable. No specific ST-T wave changes appreciated. No evidence of acute ischemia. No significant change from prior EKG dated March 24, 2021.          Trudi Reyna MD  04/15/21 [id number]

## 2024-07-23 ENCOUNTER — APPOINTMENT (OUTPATIENT)
Dept: LAB | Age: 54
End: 2024-07-23
Payer: MEDICARE

## 2024-07-23 ENCOUNTER — TELEPHONE (OUTPATIENT)
Dept: NEUROSURGERY | Facility: CLINIC | Age: 54
End: 2024-07-23

## 2024-07-23 DIAGNOSIS — G56.00 CARPAL TUNNEL SYNDROME: ICD-10-CM

## 2024-07-23 DIAGNOSIS — I51.9 MYXEDEMA HEART DISEASE: ICD-10-CM

## 2024-07-23 DIAGNOSIS — Z00.00 ROUTINE GENERAL MEDICAL EXAMINATION AT A HEALTH CARE FACILITY: ICD-10-CM

## 2024-07-23 DIAGNOSIS — E78.5 HYPERLIPIDEMIA, UNSPECIFIED HYPERLIPIDEMIA TYPE: ICD-10-CM

## 2024-07-23 DIAGNOSIS — E03.9 MYXEDEMA HEART DISEASE: ICD-10-CM

## 2024-07-23 DIAGNOSIS — Z12.5 SPECIAL SCREENING FOR MALIGNANT NEOPLASM OF PROSTATE: ICD-10-CM

## 2024-07-23 LAB
ALBUMIN SERPL BCG-MCNC: 4.1 G/DL (ref 3.5–5)
ALP SERPL-CCNC: 31 U/L (ref 34–104)
ALT SERPL W P-5'-P-CCNC: 25 U/L (ref 7–52)
ANION GAP SERPL CALCULATED.3IONS-SCNC: 8 MMOL/L (ref 4–13)
APTT PPP: 30 SECONDS (ref 23–37)
AST SERPL W P-5'-P-CCNC: 27 U/L (ref 13–39)
BASOPHILS # BLD AUTO: 0.03 THOUSANDS/ÂΜL (ref 0–0.1)
BASOPHILS NFR BLD AUTO: 1 % (ref 0–1)
BILIRUB SERPL-MCNC: 0.92 MG/DL (ref 0.2–1)
BILIRUB UR QL STRIP: NEGATIVE
BUN SERPL-MCNC: 14 MG/DL (ref 5–25)
CALCIUM SERPL-MCNC: 9.3 MG/DL (ref 8.4–10.2)
CHLORIDE SERPL-SCNC: 105 MMOL/L (ref 96–108)
CLARITY UR: CLEAR
CO2 SERPL-SCNC: 29 MMOL/L (ref 21–32)
COLOR UR: NORMAL
CREAT SERPL-MCNC: 0.81 MG/DL (ref 0.6–1.3)
EOSINOPHIL # BLD AUTO: 0.14 THOUSAND/ÂΜL (ref 0–0.61)
EOSINOPHIL NFR BLD AUTO: 4 % (ref 0–6)
ERYTHROCYTE [DISTWIDTH] IN BLOOD BY AUTOMATED COUNT: 12.5 % (ref 11.6–15.1)
EST. AVERAGE GLUCOSE BLD GHB EST-MCNC: 111 MG/DL
GFR SERPL CREATININE-BSD FRML MDRD: 100 ML/MIN/1.73SQ M
GLUCOSE P FAST SERPL-MCNC: 95 MG/DL (ref 65–99)
GLUCOSE UR STRIP-MCNC: NEGATIVE MG/DL
HBA1C MFR BLD: 5.5 %
HCT VFR BLD AUTO: 44.1 % (ref 36.5–49.3)
HGB BLD-MCNC: 14.8 G/DL (ref 12–17)
HGB UR QL STRIP.AUTO: NEGATIVE
IMM GRANULOCYTES # BLD AUTO: 0.01 THOUSAND/UL (ref 0–0.2)
IMM GRANULOCYTES NFR BLD AUTO: 0 % (ref 0–2)
INR PPP: 1.05 (ref 0.84–1.19)
KETONES UR STRIP-MCNC: NEGATIVE MG/DL
LEUKOCYTE ESTERASE UR QL STRIP: NEGATIVE
LYMPHOCYTES # BLD AUTO: 1.64 THOUSANDS/ÂΜL (ref 0.6–4.47)
LYMPHOCYTES NFR BLD AUTO: 41 % (ref 14–44)
MCH RBC QN AUTO: 31.5 PG (ref 26.8–34.3)
MCHC RBC AUTO-ENTMCNC: 33.6 G/DL (ref 31.4–37.4)
MCV RBC AUTO: 94 FL (ref 82–98)
MONOCYTES # BLD AUTO: 0.48 THOUSAND/ÂΜL (ref 0.17–1.22)
MONOCYTES NFR BLD AUTO: 12 % (ref 4–12)
NEUTROPHILS # BLD AUTO: 1.67 THOUSANDS/ÂΜL (ref 1.85–7.62)
NEUTS SEG NFR BLD AUTO: 42 % (ref 43–75)
NITRITE UR QL STRIP: NEGATIVE
NRBC BLD AUTO-RTO: 0 /100 WBCS
PH UR STRIP.AUTO: 6 [PH]
PLATELET # BLD AUTO: 117 THOUSANDS/UL (ref 149–390)
PMV BLD AUTO: 11.7 FL (ref 8.9–12.7)
POTASSIUM SERPL-SCNC: 4.2 MMOL/L (ref 3.5–5.3)
PROT SERPL-MCNC: 6.6 G/DL (ref 6.4–8.4)
PROT UR STRIP-MCNC: NEGATIVE MG/DL
PROTHROMBIN TIME: 13.6 SECONDS (ref 11.6–14.5)
PSA SERPL-MCNC: 0.76 NG/ML (ref 0–4)
RBC # BLD AUTO: 4.7 MILLION/UL (ref 3.88–5.62)
SODIUM SERPL-SCNC: 142 MMOL/L (ref 135–147)
SP GR UR STRIP.AUTO: 1.02 (ref 1–1.03)
TSH SERPL DL<=0.05 MIU/L-ACNC: 0.42 UIU/ML (ref 0.45–4.5)
UROBILINOGEN UR STRIP-ACNC: <2 MG/DL
WBC # BLD AUTO: 3.97 THOUSAND/UL (ref 4.31–10.16)

## 2024-07-23 PROCEDURE — 85610 PROTHROMBIN TIME: CPT

## 2024-07-23 PROCEDURE — 36415 COLL VENOUS BLD VENIPUNCTURE: CPT

## 2024-07-23 PROCEDURE — 83036 HEMOGLOBIN GLYCOSYLATED A1C: CPT

## 2024-07-23 PROCEDURE — 85730 THROMBOPLASTIN TIME PARTIAL: CPT

## 2024-07-23 PROCEDURE — 85025 COMPLETE CBC W/AUTO DIFF WBC: CPT

## 2024-07-23 PROCEDURE — 84443 ASSAY THYROID STIM HORMONE: CPT

## 2024-07-23 PROCEDURE — 84153 ASSAY OF PSA TOTAL: CPT

## 2024-07-23 PROCEDURE — 81003 URINALYSIS AUTO W/O SCOPE: CPT

## 2024-07-23 PROCEDURE — 80053 COMPREHEN METABOLIC PANEL: CPT

## 2024-07-23 NOTE — TELEPHONE ENCOUNTER
"07/24/2024-Called [employer]  Evangelina Beard, who verified claim in \"Open\" and pt still has Benefits. Per Evangelina, no auth required for surgery.      07/23/2024- Called [employer])  (Evangelina Beard #[id number]), LMOM for call back to verify claim and if auth needed for upcoming sx on 08/02 w/DKO.  Pt also has Medicare/Capital-No auth required.  "

## 2024-07-25 ENCOUNTER — OFFICE VISIT (OUTPATIENT)
Dept: PHYSICAL THERAPY | Facility: CLINIC | Age: 54
End: 2024-07-25
Payer: COMMERCIAL

## 2024-07-25 DIAGNOSIS — M79.671 PAIN OF RIGHT HEEL: Primary | ICD-10-CM

## 2024-07-25 DIAGNOSIS — M79.673 PAIN OF PLANTAR ASPECT OF HEEL: ICD-10-CM

## 2024-07-25 PROCEDURE — 97110 THERAPEUTIC EXERCISES: CPT | Performed by: PHYSICAL THERAPIST

## 2024-07-25 PROCEDURE — 97112 NEUROMUSCULAR REEDUCATION: CPT | Performed by: PHYSICAL THERAPIST

## 2024-07-25 PROCEDURE — 97140 MANUAL THERAPY 1/> REGIONS: CPT | Performed by: PHYSICAL THERAPIST

## 2024-07-25 NOTE — PROGRESS NOTES
"Daily Note     Today's date: 2024  Patient name: Gallo Gallegos  : 1970  MRN: [id number]  Referring provider: Alexi Ruiz MD  Dx:   Encounter Diagnosis     ICD-10-CM    1. Pain of right heel  M79.671       2. Pain of plantar aspect of heel  M79.673           Start Time: 0900  Stop Time: 0945  Total time in clinic (min): 45 minutes    Subjective: Pt reports mild soreness after RPW treatment last session.       Objective: See treatment diary below      Assessment: Tolerated treatment well. No adverse soreness present at heel with current program. Pt notes more pain in his neck than his foot at the moment. Good performance of newly prescribed foot exercises. No discomfort with WB after treatment today. Patient would benefit from continued PT      Plan: Continue per plan of care.      Precautions: H/O Spinal cord injury, Cardiomyopathy, Lumbar radiculopathy, CHF, Cervical fusion, Cardiac surgery         Manuals            RPW IASTM  15 hz, 1500 pulses, 1.8 bar - Right heel              STJ medial and lateral glides  Gr III           Great toe extension mobs  Gr III           TC joint PA glide   Gr III            Neuro Re-Ed             Toe yoga   30x           Toe spreading  20x           Seated HR with towel for toe extension  2x10                                                                Ther Ex             PF and gastroc stretch c/ towel  3x30\" on R           SB grastroc stretch              Elliptical   5'                                                                             Ther Activity                                       Gait Training                                       Modalities                                       1:1 with PT from      "

## 2024-07-26 ENCOUNTER — ANESTHESIA EVENT (OUTPATIENT)
Dept: PERIOP | Facility: HOSPITAL | Age: 54
End: 2024-07-26
Payer: MEDICARE

## 2024-07-26 NOTE — TELEPHONE ENCOUNTER
Pt called requesting presurgery routine for antiseptic and wipes, can you call him at  to discuss?  Thanks

## 2024-07-26 NOTE — PRE-PROCEDURE INSTRUCTIONS
Pre-Surgery Instructions:   Medication Instructions    acetaminophen (TYLENOL) 500 mg tablet Uses PRN- OK to take day of surgery    baclofen 10 mg tablet Take day of surgery.    betamethasone valerate (VALISONE) 0.1 % lotion Hold day of surgery.    carvedilol (COREG) 25 mg tablet Take day of surgery.    cholecalciferol (VITAMIN D3) 1,000 units tablet Stop taking 7 days prior to surgery.    DAILY MULTIPLE VITAMINS PO Stop taking 7 days prior to surgery.    hydroCHLOROthiazide 12.5 mg tablet Hold day of surgery.    levothyroxine 125 mcg tablet Take day of surgery.    losartan (COZAAR) 25 mg tablet Hold day of surgery.    Omega-3 Fatty Acids (FISH OIL PO) Stop taking 7 days prior to surgery.    Zinc 50 MG TABS Stop taking 7 days prior to surgery.   Medication instructions for day surgery reviewed. Please use only a sip of water to take your instructed medications. Avoid all over the counter vitamins, supplements and NSAIDS for one week prior to surgery per anesthesia guidelines. Tylenol is ok to take as needed.     You will receive a call one business day prior to surgery with an arrival time and hospital directions. If your surgery is scheduled on a Monday, the hospital will be calling you on the Friday prior to your surgery. If you have not heard from anyone by 8pm, please call the hospital supervisor through the hospital  at [phone]. ([place] [phone] or [place] [phone]).    Do not eat or drink anything after midnight the night before your surgery, including candy, mints, lifesavers, or chewing gum. Do not drink alcohol 24hrs before your surgery. Try not to smoke at least 24hrs before your surgery.       Follow the pre surgery showering instructions as listed in the “My Surgical Experience Booklet” or otherwise provided by your surgeon's office. Do not use a blade to shave the surgical area 1 week before surgery. It is okay to use a clean electric clippers up to 24 hours before surgery. Do  not apply any lotions, creams, including makeup, cologne, deodorant, or perfumes after showering on the day of your surgery. Do not use dry shampoo, hair spray, hair gel, or any type of hair products.     No contact lenses, eye make-up, or artificial eyelashes. Remove nail polish, including gel polish, and any artificial, gel, or acrylic nails if possible. Remove all jewelry including rings and body piercing jewelry.     Wear causal clothing that is easy to take on and off. Consider your type of surgery.    Keep any valuables, jewelry, piercings at home. Please bring any specially ordered equipment (sling, braces) if indicated.    Arrange for a responsible person to drive you to and from the hospital on the day of your surgery. Please confirm the visitor policy for the day of your procedure when you receive your phone call with an arrival time.     Call the surgeon's office with any new illnesses, exposures, or additional questions prior to surgery.    Please reference your “My Surgical Experience Booklet” for additional information to prepare for your upcoming surgery.

## 2024-07-29 ENCOUNTER — OFFICE VISIT (OUTPATIENT)
Dept: PHYSICAL THERAPY | Facility: CLINIC | Age: 54
End: 2024-07-29
Payer: COMMERCIAL

## 2024-07-29 DIAGNOSIS — M79.671 PAIN OF RIGHT HEEL: Primary | ICD-10-CM

## 2024-07-29 DIAGNOSIS — M79.673 PAIN OF PLANTAR ASPECT OF HEEL: ICD-10-CM

## 2024-07-29 PROCEDURE — 97110 THERAPEUTIC EXERCISES: CPT | Performed by: PHYSICAL THERAPIST

## 2024-07-29 PROCEDURE — 97112 NEUROMUSCULAR REEDUCATION: CPT | Performed by: PHYSICAL THERAPIST

## 2024-07-29 PROCEDURE — 97140 MANUAL THERAPY 1/> REGIONS: CPT | Performed by: PHYSICAL THERAPIST

## 2024-07-29 NOTE — PROGRESS NOTES
"Daily Note     Today's date: 2024  Patient name: Gallo Gallegos  : 1970  MRN: [id number]  Referring provider: Alexi Ruiz MD  Dx:   Encounter Diagnosis     ICD-10-CM    1. Pain of right heel  M79.671       2. Pain of plantar aspect of heel  M79.673                      Subjective: Pt continues to work on HEP. He will be undergoing carpal tunnel release on Friday.       Objective: See treatment diary below      Assessment: Tolerated treatment well. Utilized graston IASTM for heel discomfort. STJ is most restricted joint within ankle and foot.  This improved with manual therapy. Implemented KB for seated HR. Patient exhibited good technique with therapeutic exercises and would benefit from continued PT when he is able to return. He will take 2 weeks off due to surgery, and plans to return on .       Plan: Continue per plan of care.      Precautions: H/O Spinal cord injury, Cardiomyopathy, Lumbar radiculopathy, CHF, Cervical fusion, Cardiac surgery         Manuals            RPW IASTM  15 hz, 1500 pulses, 1.8 bar - Right heel    5' using graston           STJ medial and lateral glides  Gr III Gr III          Great toe extension mobs  Gr III Gr III          TC joint PA glide   Gr III  Gr III          Neuro Re-Ed             Toe yoga   30x 20x          Toe spreading  20x 20x          Seated HR with towel for toe extension  2x10  2x10 15# KB                                                              Ther Ex             PF and gastroc stretch c/ towel  3x30\" on R           SB grastroc stretch    10x10\"           Elliptical   5'  10' Lvl 11                                                                           Ther Activity                                       Gait Training                                       Modalities                                       1:1 with PT from        "

## 2024-08-01 ENCOUNTER — DOCUMENTATION (OUTPATIENT)
Dept: NEUROSURGERY | Facility: CLINIC | Age: 54
End: 2024-08-01

## 2024-08-01 ENCOUNTER — APPOINTMENT (OUTPATIENT)
Dept: PHYSICAL THERAPY | Facility: CLINIC | Age: 54
End: 2024-08-01
Payer: COMMERCIAL

## 2024-08-02 ENCOUNTER — HOSPITAL ENCOUNTER (OUTPATIENT)
Facility: HOSPITAL | Age: 54
Setting detail: OUTPATIENT SURGERY
Discharge: HOME | End: 2024-08-02
Attending: NEUROLOGICAL SURGERY | Admitting: NEUROLOGICAL SURGERY
Payer: MEDICARE

## 2024-08-02 ENCOUNTER — ANESTHESIA (OUTPATIENT)
Dept: PERIOP | Facility: HOSPITAL | Age: 54
End: 2024-08-02
Payer: MEDICARE

## 2024-08-02 VITALS
HEIGHT: 69 IN | HEART RATE: 68 BPM | BODY MASS INDEX: 30.36 KG/M2 | DIASTOLIC BLOOD PRESSURE: 78 MMHG | OXYGEN SATURATION: 98 % | WEIGHT: 205 LBS | SYSTOLIC BLOOD PRESSURE: 109 MMHG | RESPIRATION RATE: 16 BRPM | TEMPERATURE: 97.7 F

## 2024-08-02 PROCEDURE — 64721 CARPAL TUNNEL SURGERY: CPT | Performed by: NEUROLOGICAL SURGERY

## 2024-08-02 RX ORDER — PROPOFOL 10 MG/ML
INJECTION, EMULSION INTRAVENOUS CONTINUOUS PRN
Status: DISCONTINUED | OUTPATIENT
Start: 2024-08-02 | End: 2024-08-02

## 2024-08-02 RX ORDER — CEFAZOLIN SODIUM 2 G/50ML
2000 SOLUTION INTRAVENOUS ONCE
Status: COMPLETED | OUTPATIENT
Start: 2024-08-02 | End: 2024-08-02

## 2024-08-02 RX ORDER — LIDOCAINE HYDROCHLORIDE AND EPINEPHRINE 10; 10 MG/ML; UG/ML
INJECTION, SOLUTION INFILTRATION; PERINEURAL AS NEEDED
Status: DISCONTINUED | OUTPATIENT
Start: 2024-08-02 | End: 2024-08-02 | Stop reason: HOSPADM

## 2024-08-02 RX ORDER — ACETAMINOPHEN 325 MG/1
975 TABLET ORAL EVERY 6 HOURS PRN
Status: DISCONTINUED | OUTPATIENT
Start: 2024-08-02 | End: 2024-08-02 | Stop reason: HOSPADM

## 2024-08-02 RX ORDER — CHLORHEXIDINE GLUCONATE ORAL RINSE 1.2 MG/ML
15 SOLUTION DENTAL ONCE
Status: DISCONTINUED | OUTPATIENT
Start: 2024-08-02 | End: 2024-08-02

## 2024-08-02 RX ORDER — SODIUM CHLORIDE, SODIUM LACTATE, POTASSIUM CHLORIDE, CALCIUM CHLORIDE 600; 310; 30; 20 MG/100ML; MG/100ML; MG/100ML; MG/100ML
INJECTION, SOLUTION INTRAVENOUS CONTINUOUS PRN
Status: DISCONTINUED | OUTPATIENT
Start: 2024-08-02 | End: 2024-08-02

## 2024-08-02 RX ORDER — GINSENG 100 MG
CAPSULE ORAL AS NEEDED
Status: DISCONTINUED | OUTPATIENT
Start: 2024-08-02 | End: 2024-08-02 | Stop reason: HOSPADM

## 2024-08-02 RX ORDER — MAGNESIUM HYDROXIDE 1200 MG/15ML
LIQUID ORAL AS NEEDED
Status: DISCONTINUED | OUTPATIENT
Start: 2024-08-02 | End: 2024-08-02 | Stop reason: HOSPADM

## 2024-08-02 RX ORDER — PROPOFOL 10 MG/ML
INJECTION, EMULSION INTRAVENOUS AS NEEDED
Status: DISCONTINUED | OUTPATIENT
Start: 2024-08-02 | End: 2024-08-02

## 2024-08-02 RX ORDER — LIDOCAINE HYDROCHLORIDE 10 MG/ML
INJECTION, SOLUTION EPIDURAL; INFILTRATION; INTRACAUDAL; PERINEURAL AS NEEDED
Status: DISCONTINUED | OUTPATIENT
Start: 2024-08-02 | End: 2024-08-02

## 2024-08-02 RX ADMIN — PROPOFOL 100 MCG/KG/MIN: 10 INJECTION, EMULSION INTRAVENOUS at 07:40

## 2024-08-02 RX ADMIN — PROPOFOL 100 MG: 10 INJECTION, EMULSION INTRAVENOUS at 07:40

## 2024-08-02 RX ADMIN — LIDOCAINE HYDROCHLORIDE 50 MG: 10 INJECTION, SOLUTION EPIDURAL; INFILTRATION; INTRACAUDAL; PERINEURAL at 07:40

## 2024-08-02 RX ADMIN — SODIUM CHLORIDE, SODIUM LACTATE, POTASSIUM CHLORIDE, AND CALCIUM CHLORIDE: .6; .31; .03; .02 INJECTION, SOLUTION INTRAVENOUS at 07:26

## 2024-08-02 RX ADMIN — CEFAZOLIN SODIUM 2000 MG: 2 SOLUTION INTRAVENOUS at 07:32

## 2024-08-02 NOTE — ANESTHESIA PREPROCEDURE EVALUATION
Procedure:  Right carpal tunnel release (Right: Wrist)    Relevant Problems   ANESTHESIA  Nausea after dental extraction in the office, no nausea after recent colonoscopy      CARDIO  Viral CMP with LVEF 35%  No CAD  Last admission for CHF exacerbation at time of diagnosis in 2009   (+) Hyperlipidemia   (+) Premature ventricular contraction      ENDO (within normal limits)      GI/HEPATIC  Confirmed NPO appropriate  Denies current nausea      /RENAL (within normal limits)      HEMATOLOGY (within normal limits)      MUSCULOSKELETAL   (+) Lower back pain   (+) Sacroiliitis (HCC)      NEURO/PSYCH (within normal limits)      PULMONARY (within normal limits)   (-) Smoking   (-) URI (upper respiratory infection)        Physical Exam    Airway    Mallampati score: II  TM Distance: >3 FB  Neck ROM: full     Dental   No notable dental hx     Cardiovascular  Rhythm: regular, Rate: normal    Pulmonary   Breath sounds clear to auscultation, No rhonchi    Other Findings        Anesthesia Plan  ASA Score- 3     Anesthesia Type- IV sedation with anesthesia with ASA Monitors.         Additional Monitors:     Airway Plan:     Comment: I discussed the risks and benefits of IV sedation anesthesia including the possibility of the need to convert to general anesthesia and the potential risk of awareness.  The patient was given the opportunity to ask questions, which were answered..       Plan Factors-Exercise tolerance (METS): >4 METS.    Chart reviewed. EKG reviewed.  Existing labs reviewed.     Patient is not a current smoker.      Obstructive sleep apnea risk education given perioperatively.        Induction- intravenous.    Postoperative Plan-         Informed Consent- Anesthetic plan and risks discussed with patient and spouse.  I personally reviewed this patient with the CRNA. Discussed and agreed on the Anesthesia Plan with the CRNA..      TTE 2/28/23:  Interpretation Summary         Left Ventricle: Left ventricular cavity size  is dilated. Wall thickness is normal. There is eccentric hypertrophy. The left ventricular ejection fraction is 35%. Systolic function is moderately reduced. Global longitudinal strain is reduced at 12%. Diastolic function is normal.    The following segments are hypokinetic: basal inferoseptal and basal inferior.    All other segments are normal.    Right Ventricle: Right ventricular cavity size is dilated.    Left Atrium: The atrium is mildly dilated.    Right Atrium: The atrium is mildly dilated.

## 2024-08-02 NOTE — OP NOTE
OPERATIVE REPORT  PATIENT NAME: Gallo Gallegos    :  1970  MRN: [id number]  Pt Location:  OR ROOM 09    SURGERY DATE: 2024    Surgeons and Role:     * Dilan Bassett MD - Primary    Preop Diagnosis:  Carpal tunnel syndrome [G56.00]    Post-Op Diagnosis Codes:     * Carpal tunnel syndrome [G56.00]    Procedure(s):  Right - Right carpal tunnel release    Specimen(s):  * No specimens in log *    Estimated Blood Loss:   Minimal    Drains:  * No LDAs found *    Anesthesia Type:   Conscious Sedation     Operative Indications:  Carpal tunnel syndrome [G56.00]      Operative Findings:  Tight flexorratinaculum      Complications:   None    Procedure and Technique:  After adequate monitored anesthesia control and armboard was placed on the patient's stretcher.  The right arm was prepped and draped in double layer fashion.    A time-out was called and all parameters a time-out were followed.     The skin of the wrist overlying the carpal tunnel was injected with 1% lidocaine with 1 100,000 epinephrine.  A number 15 blade was used to incise the skin.  An ora style retractor was used to maintain operative exposure.  The flexor retinaculum was identified.  15. Blade was used to incise this.  The median nerve could be easily identified.  This was extended both proximally and distally to widely decompress the median nerve.  At the conclusion of this a Penfield 4 could be placed to the mid palm and into the region of the mid forearm.    Copious quantities of irrigation were used to cleanse out the region.  The superficial tissues were approximated with interrupted inverted 4 0 Vicryl suture.  The skin was closed with a running continuous 4-0 nylon suture.  Clean sterile dressings were placed.  The arm was placed in a sling.   I was present for the entire procedure.    Patient Disposition:  PACU         SIGNATURE: Dilan Bassett MD  DATE: 2024  TIME: 8:12 AM

## 2024-08-02 NOTE — ANESTHESIA POSTPROCEDURE EVALUATION
Post-Op Assessment Note    CV Status:  Stable  Pain Score: 0    Pain management: adequate       Mental Status:  Alert and awake   Hydration Status:  Euvolemic   PONV Controlled:  Controlled   Airway Patency:  Patent     Post Op Vitals Reviewed: Yes    No anethesia notable event occurred.    Staff: ESTEFANI               /78 (08/02/24 0812)    Temp 97.7 °F (36.5 °C) (08/02/24 0812)    Pulse 64 (08/02/24 0812)   Resp 16 (08/02/24 0812)    SpO2 96 % (08/02/24 0812)

## 2024-08-06 ENCOUNTER — TELEPHONE (OUTPATIENT)
Dept: NEUROSURGERY | Facility: CLINIC | Age: 54
End: 2024-08-06

## 2024-08-06 NOTE — TELEPHONE ENCOUNTER
1st attempt-attempted to contact patient on preferred number to check in on status since discharge and to provide post op instructions. No answer, left voicemail requesting callback. Will make another attempt if no callback is received.

## 2024-08-06 NOTE — TELEPHONE ENCOUNTER
Callback received.    Gallo reports he is doing well overall and denies any incisional issues or fevers. He does note some swelling in his right hand. Explained that following surgery this is to be expected, requested that he try to elevate his hand above his elbow to help with this symptom. Patient is able to ambulate around the house and complete ADLs. Educated the patient about the importance of preventing blood clots and provided measures how to prevent them.     Reviewed incision care with the patient. Advised that at this time he may take a shower and gently wash the surgical site with soap and water. Do not submerge in water until cleared by the surgeon. Do not apply any creams, ointments, or lotions to the site.  Patient is aware to call the office if any redness, swelling, drainage, dehiscence of incision, or fever >100 F occurs.    Patient is aware to call the office if any concerns or questions may arise. Reminded patient of his upcoming appointments with the date/time/location. Patient was appreciative for the call.

## 2024-08-16 ENCOUNTER — CLINICAL SUPPORT (OUTPATIENT)
Dept: NEUROSURGERY | Facility: CLINIC | Age: 54
End: 2024-08-16

## 2024-08-16 VITALS — TEMPERATURE: 98.3 F | SYSTOLIC BLOOD PRESSURE: 118 MMHG | DIASTOLIC BLOOD PRESSURE: 70 MMHG

## 2024-08-16 DIAGNOSIS — Z98.890 POST-OPERATIVE STATE: Primary | ICD-10-CM

## 2024-08-16 PROCEDURE — 99024 POSTOP FOLLOW-UP VISIT: CPT | Performed by: NEUROLOGICAL SURGERY

## 2024-08-16 NOTE — PATIENT INSTRUCTIONS
1. Wash incision gently in the shower. Avoid submerging in tub, hot tub, or pool.  2. Leave incision open to air when ever possible, may cover loosely with gauze and paper tape for comfort. Do not seal incision under bandages.    3. Do not apply any creams, ointments, or lotions directly to incision.   4. Maintain activity restrictions of lifting, pushing, pulling no more than 5-10 pounds.   5. Ambulate as tolerated.   6. Return for follow-up visit on 9/12/2024 9:30 am  7. Contact the office with any questions or concerns.

## 2024-08-16 NOTE — PROGRESS NOTES
Post-Op Visit- Neurosurgery    Gallo Gallegos 54 y.o. male MRN: [id number]    Chief Complaint:  Patient presents post: Right carpal tunnel release - Right    History of Present Illness:  Patient presents for 2 week POV for incision check.     Gallo arrived accompanied by his wife and ambulated well without assistive device. He reports he continues to have some numbness in his 2nd, 3rd, and 4th digit but denies any significant pain. He notes the suturing at the proximal end of his incision came loose and has begun to come out on its own. There has been no dehiscence or drainage.     Assessment:   Vitals:    08/16/24 1250   BP: 118/70   Temp: 98.3 °F (36.8 °C)     Wound Exam: Incision is clean, dry, and in tact, without edema, or erythema. See below:         Procedure:  Staple/suture removal.   Procedure Note: Cleansed the incision with CHG swab before nylon suturing removed. Left YARITZA.   Patient Status: the patient tolerated the procedure well.  Complications: None.       Discussion/Summary:  Reviewed incision care with patient including daily observation for s/s infection including: increased erythema, edema, drainage, dehiscence of incision or fever >101.  Should these be observed, he understands that he is to call and/or return immediately for reassessment.  Advised patient to continue cleansing area with mild soap and water and pat dry. Not to apply any lotions, creams, or ointments, & not to submerge in any water for 4 more weeks.     He is to maintain activity restrictions until cleared by the surgeon. Activity levels were also reviewed with the patient in detail, he is to lift no greater than 10 pounds and ambulation is encouraged as tolerated.     Verified date/time/location of upcoming POV. He is to call the office with any further questions or concerns, or if any incisional issues or fevers would arise.

## 2024-08-20 ENCOUNTER — OFFICE VISIT (OUTPATIENT)
Dept: PHYSICAL THERAPY | Facility: CLINIC | Age: 54
End: 2024-08-20
Payer: COMMERCIAL

## 2024-08-20 DIAGNOSIS — M79.671 PAIN OF RIGHT HEEL: Primary | ICD-10-CM

## 2024-08-20 DIAGNOSIS — M79.673 PAIN OF PLANTAR ASPECT OF HEEL: ICD-10-CM

## 2024-08-20 PROCEDURE — 97110 THERAPEUTIC EXERCISES: CPT | Performed by: PHYSICAL THERAPIST

## 2024-08-20 PROCEDURE — 97140 MANUAL THERAPY 1/> REGIONS: CPT | Performed by: PHYSICAL THERAPIST

## 2024-08-20 PROCEDURE — 97535 SELF CARE MNGMENT TRAINING: CPT | Performed by: PHYSICAL THERAPIST

## 2024-08-20 NOTE — PROGRESS NOTES
"Daily Note     Today's date: 2024  Patient name: Gallo Gallegos  : 1970  MRN: [id number]  Referring provider: Alexi Ruiz MD  Dx:   Encounter Diagnosis     ICD-10-CM    1. Pain of right heel  M79.671       2. Pain of plantar aspect of heel  M79.673           Start Time: 1000  Stop Time: 1046  Total time in clinic (min): 46 minutes    Subjective: Pt returns to PT following carpal tunnel release. Pt is frustrated with current pain state of his right foot. He notes minimal to no improvement with HEP. He has been wearing night splint every night.       Objective: See treatment diary below      Assessment: Resumed RPW and treatment on the foot. Discussed the importance of continuing HEP to work on movement impairments. I recommend patient consult podiatry and consider injection if he is a candidate. Pt has been wearing night splint without much help. Recommend patient discharge this at this time. Tolerated treatment fair.  Mild tension noted with medial glides of the calcaneous. Patient would benefit from continued PT      Plan: Continue per plan of care.      Precautions: H/O Spinal cord injury, Cardiomyopathy, Lumbar radiculopathy, CHF, Cervical fusion, Cardiac surgery         Manuals          RPW IASTM  15 hz, 1500 pulses, 1.8 bar - Right heel    5' using graston  15 hz, 1500 pulses, 1.8 bar - Right heel          STJ medial and lateral glides  Gr III Gr III Gr III         Great toe extension mobs  Gr III Gr III          TC joint PA glide   Gr III  Gr III Gr III         Neuro Re-Ed             Toe yoga   30x 20x          Toe spreading  20x 20x          Seated HR with towel for toe extension  2x10  2x10 15# KB                                                              Ther Ex             PF and gastroc stretch c/ towel  3x30\" on R           SB grastroc stretch    10x10\"           Elliptical   5'  10' Lvl 11          STM using roller    5'                                                 "             Ther Activity                                       Gait Training                          Consultation    10'                                                 1:1 with PT from [id number]

## 2024-08-27 ENCOUNTER — OFFICE VISIT (OUTPATIENT)
Dept: PHYSICAL THERAPY | Facility: CLINIC | Age: 54
End: 2024-08-27
Payer: COMMERCIAL

## 2024-08-27 ENCOUNTER — TELEPHONE (OUTPATIENT)
Dept: PAIN MEDICINE | Facility: CLINIC | Age: 54
End: 2024-08-27

## 2024-08-27 ENCOUNTER — TELEPHONE (OUTPATIENT)
Age: 54
End: 2024-08-27

## 2024-08-27 DIAGNOSIS — M79.671 PAIN OF RIGHT HEEL: Primary | ICD-10-CM

## 2024-08-27 DIAGNOSIS — M79.673 PAIN OF PLANTAR ASPECT OF HEEL: ICD-10-CM

## 2024-08-27 PROCEDURE — 97112 NEUROMUSCULAR REEDUCATION: CPT | Performed by: PHYSICAL THERAPIST

## 2024-08-27 PROCEDURE — 97110 THERAPEUTIC EXERCISES: CPT | Performed by: PHYSICAL THERAPIST

## 2024-08-27 PROCEDURE — 97140 MANUAL THERAPY 1/> REGIONS: CPT | Performed by: PHYSICAL THERAPIST

## 2024-08-27 NOTE — TELEPHONE ENCOUNTER
Caller: Gallo     Doctor: Sara    Reason for call: patient calling to find out do we treat Planters Fascitis ?    Last seen 3/11/2024    Call back#: [phone]

## 2024-08-27 NOTE — PROGRESS NOTES
Daily Note     Today's date: 2024  Patient name: Gallo Gallegos  : 1970  MRN: [id number]  Referring provider: Alexi Ruiz MD  Dx:   Encounter Diagnosis     ICD-10-CM    1. Pain of right heel  M79.671       2. Pain of plantar aspect of heel  M79.673           Start Time: 1000  Stop Time: 1045  Total time in clinic (min): 45 minutes    Subjective: Pt reports minimal change to heel pain. He is frustrated with current pain in heel which is limiting his activity level. He had to take medication today to help reduce overall symptoms.      Objective: See treatment diary below      Assessment: Recommend patient see Dr. Emanuel for plantar heel pain. Pt's flexibility and ROM is improving at involved foot/ankle, although pain continues. Good performance with clinical exercise. Tolerated treatment well. Tenderness present at right heel which extends posterolaterally. Pt has only had 3 RPW treatments. Patient would benefit from continued PT, including several more sessions of shockwave. If patient does receive a CSI, should he get a second opinion, we will have to terminate RPW treatment.       Plan: Continue per plan of care. Recommend consultation with Podiatry secondary to minimal pain relief with PT.      Precautions: H/O Spinal cord injury, Cardiomyopathy, Lumbar radiculopathy, CHF, Cervical fusion, Cardiac surgery         Manuals         RPW IASTM  15 hz, 1500 pulses, 1.8 bar - Right heel    5' using graston  15 hz, 1500 pulses, 1.8 bar - Right heel  15 hz, 1500 pulses, 1.8 bar - Right heel         STJ medial and lateral glides  Gr III Gr III Gr III Gr III        Great toe extension mobs  Gr III Gr III  Gr III        TC joint PA glide   Gr III  Gr III Gr III Gr III        Neuro Re-Ed             Toe yoga   30x 20x  20x        Toe spreading  20x 20x          Seated HR with towel for toe extension  2x10  2x10 15# KB  2x10        HR on SB     2x10                                               "  Ther Ex             PF and gastroc stretch c/ towel  3x30\" on R           SB grastroc stretch    10x10\"   10x10\"         Elliptical   5'  10' Lvl 11  10' lvl 11        STM using roller    5'                                                             Ther Activity                                       Gait Training                          Consultation    10'                                                 1:1 with PT from [id number]           "

## 2024-08-28 ENCOUNTER — APPOINTMENT (OUTPATIENT)
Dept: RADIOLOGY | Facility: MEDICAL CENTER | Age: 54
End: 2024-08-28
Payer: MEDICARE

## 2024-08-28 ENCOUNTER — OFFICE VISIT (OUTPATIENT)
Dept: CARDIOLOGY CLINIC | Facility: CLINIC | Age: 54
End: 2024-08-28
Payer: MEDICARE

## 2024-08-28 VITALS
SYSTOLIC BLOOD PRESSURE: 112 MMHG | WEIGHT: 210 LBS | HEART RATE: 58 BPM | HEIGHT: 69 IN | DIASTOLIC BLOOD PRESSURE: 70 MMHG | BODY MASS INDEX: 31.1 KG/M2

## 2024-08-28 DIAGNOSIS — M72.2 PLANTAR FASCIITIS: ICD-10-CM

## 2024-08-28 DIAGNOSIS — R60.0 LOCALIZED EDEMA: ICD-10-CM

## 2024-08-28 DIAGNOSIS — I50.22 CHRONIC SYSTOLIC CONGESTIVE HEART FAILURE (HCC): ICD-10-CM

## 2024-08-28 DIAGNOSIS — I42.9 CARDIOMYOPATHY, UNSPECIFIED TYPE (HCC): Primary | ICD-10-CM

## 2024-08-28 DIAGNOSIS — M79.673 HEEL PAIN, UNSPECIFIED LATERALITY: ICD-10-CM

## 2024-08-28 PROCEDURE — 73650 X-RAY EXAM OF HEEL: CPT

## 2024-08-28 PROCEDURE — 99215 OFFICE O/P EST HI 40 MIN: CPT | Performed by: INTERNAL MEDICINE

## 2024-08-28 PROCEDURE — 73630 X-RAY EXAM OF FOOT: CPT

## 2024-08-28 NOTE — PROGRESS NOTES
PT Discharge:   Pt called to cancel remaining appointments. Overall, he wasn't feeling much benefit from our intervention for his plantar heel pain. I did recommend him to see Dr. Emanuel last visit, which is schedule for 10/1/24. He is welcome to return to me,  should he like future advice or if symptoms don't resolve. He did mention to the  he will be seeking care at [facility].

## 2024-09-12 ENCOUNTER — OFFICE VISIT (OUTPATIENT)
Dept: NEUROSURGERY | Facility: CLINIC | Age: 54
End: 2024-09-12

## 2024-09-12 VITALS
OXYGEN SATURATION: 99 % | TEMPERATURE: 98 F | BODY MASS INDEX: 30.36 KG/M2 | SYSTOLIC BLOOD PRESSURE: 124 MMHG | WEIGHT: 205 LBS | RESPIRATION RATE: 18 BRPM | HEART RATE: 110 BPM | HEIGHT: 69 IN | DIASTOLIC BLOOD PRESSURE: 82 MMHG

## 2024-09-12 DIAGNOSIS — Z98.890 POST-OPERATIVE STATE: Primary | ICD-10-CM

## 2024-09-12 PROCEDURE — 99024 POSTOP FOLLOW-UP VISIT: CPT | Performed by: NEUROLOGICAL SURGERY
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
## 2024-09-17 PROCEDURE — 93000 ELECTROCARDIOGRAM COMPLETE: CPT | Performed by: INTERNAL MEDICINE

## 2024-09-17 NOTE — PROGRESS NOTES
EPS follow-up- Gallo Gallegos 54 y.o. male MRN: [id number]           ASSESSMENT:  1. Cardiomyopathy, unspecified type (HCC)  POCT ECG    Echo complete w/ contrast if indicated    VAS VENOUS DUPLEX -LOWER LIMB UNILATERAL      2. Chronic systolic congestive heart failure (HCC)  VAS VENOUS DUPLEX -LOWER LIMB UNILATERAL      3. Localized edema  VAS VENOUS DUPLEX -LOWER LIMB UNILATERAL              PLAN:  We spent 1 hour to 1 hour and 15 minutes today discussing potential defibrillators including transvenous defibrillator [employer] subcutaneous defibrillator and [employer] EV ICD.  I explained that all of these options would be reasonable.  He does have a right bundle branch block with a QRS duration of 160 ms so he could be considered for CRT-D however patients with right bundle branch blocks generally do not respond as well as left bundle branch blocks.    Given his young age and spinal hardware it would be very reasonable to start with an extravascular ICD.  Gallo presented with a very thorough list of questions today all of which were very relevant and good questions.  I spent my time answering all of them.    Ultimately my recommendation was that he have a consultation at [facility] where they have more experience with the EV ICD and I have arranged this to occur in the near future.  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   09/12/13 underwent PVC ablation but was limited due to proximity to LAD  02/2023 ECHO w/ EF 35%  Has been receiving GDMT with losartan and carvedilol  Has tendency toward hypotension so additional afterload reduction may not be tolerated  Has hyperkalemia history so spironolactone may not be good idea  Despite GDMT as above cardiac MRI  "06/20/23 EF 35%   PVC  Underwent ablation 09/12/13  PVC mapped to proximal AIV  Ablation was limited by proximity to the LAD  PVC burden as high as 30% on 2016 holter  Holter 06/2023:  NSR throughout study  8.4% PVC w/o NSVT  No significant pauses  Maintained on carvedilol 25mg BID at this time  HLD  Hypothyroidism  Maintained on levothyroxine          .      Patient has been instructed to follow up in our EP office in 3 months or as needed. He will call our office with any questions or concerns in the meantime.      ROS   Occasional right lower extremity edema recent carpal tunnel surgery with right wrist improving no significant chest discomfort or palpitations mild dyspnea with exertion all other 12 point review of systems negative for complaints    Objective:     Vitals: Blood pressure 112/70, pulse 58, height 5' 9\" (1.753 m), weight 95.3 kg (210 lb)., Body mass index is 31.01 kg/m².,        Physical Exam:    GEN: Gallo Gallegos appears well, alert and oriented x 3, pleasant and cooperative   HEENT: pupils equal, round, and reactive to light; extraocular muscles intact  NECK: supple, no carotid bruits   HEART: regular rhythm, normal S1 and S2, no murmurs, clicks, gallops or rubs   LUNGS: clear to auscultation bilaterally; no wheezes, rales, or rhonchi   ABDOMEN: normal bowel sounds, soft, no tenderness, no distention  EXTREMITIES: peripheral pulses normal; no clubbing, cyanosis, or edema  NEURO: no focal findings   SKIN: normal without suspicious lesions on exposed skin    Medications:      Current Outpatient Medications:     acetaminophen (TYLENOL) 500 mg tablet, Take 1 tablet by mouth every 6 (six) hours as needed  , Disp: , Rfl:     baclofen 10 mg tablet, TAKE 1 TABLET BY MOUTH 3 TIMES A DAY FOR SPASMS, Disp: , Rfl: 3    carvedilol (COREG) 25 mg tablet, Take by mouth Twice daily, Disp: , Rfl:     cholecalciferol (VITAMIN D3) 1,000 units tablet, Take 1,000 Units by mouth daily, Disp: , Rfl:     DAILY MULTIPLE " VITAMINS PO, Take 1 tablet by mouth daily, Disp: , Rfl:     hydroCHLOROthiazide 12.5 mg tablet, Take 12.5 mg by mouth as needed, Disp: , Rfl:     levothyroxine 125 mcg tablet, Take 1 tablet by mouth daily, Disp: , Rfl:     losartan (COZAAR) 25 mg tablet, Take 1 tablet by mouth daily, Disp: , Rfl:     Zinc 50 MG TABS, Take 1 tablet by mouth as needed, Disp: , Rfl:     betamethasone valerate (VALISONE) 0.1 % lotion, Apply sparingly to affected area(s) of scalp once or twice a day as needed. (Patient not taking: Reported on 8/28/2024), Disp: 60 mL, Rfl: 0     Family History   Problem Relation Age of Onset    Diabetes Mother     Throat cancer Father      Social History     Socioeconomic History    Marital status: /Civil Union     Spouse name: Not on file    Number of children: Not on file    Years of education: Not on file    Highest education level: Not on file   Occupational History    Not on file   Tobacco Use    Smoking status: Never    Smokeless tobacco: Never   Vaping Use    Vaping status: Never Used   Substance and Sexual Activity    Alcohol use: Never    Drug use: Never    Sexual activity: Not on file   Other Topics Concern    Not on file   Social History Narrative    Not on file     Social Determinants of Health     Financial Resource Strain: Not on file   Food Insecurity: Not on file   Transportation Needs: Not on file   Physical Activity: Not on file   Stress: Not on file   Social Connections: Not on file   Intimate Partner Violence: Not on file   Housing Stability: Not on file     Social History     Tobacco Use   Smoking Status Never   Smokeless Tobacco Never     Social History     Substance and Sexual Activity   Alcohol Use Never       Labs & Results:  Below is the patient's most recent value for Albumin, ALT, AST, BUN, Calcium, Chloride, Cholesterol, CO2, Creatinine, GFR, Glucose, HDL, Hematocrit, Hemoglobin, Hemoglobin A1C, LDL, Magnesium, Phosphorus, Platelets, Potassium, PSA, Sodium,  Triglycerides, and WBC.   Lab Results   Component Value Date    ALT 25 2024    AST 27 2024    BUN 14 2024    CALCIUM 9.3 2024     2024    CHOL 172 2016    CO2 29 2024    CREATININE 0.81 2024    HDL 38 (L) 2023    HCT 44.1 2024    HGB 14.8 2024    HGBA1C 5.5 2024    MG 2.1 2014    PHOS 3.2 2014     (L) 2024    K 4.2 2024    PSA 0.762 2024     2014    TRIG 110 2023    WBC 3.97 (L) 2024     Note: for a comprehensive list of the patient's lab results, access the Results Review activity.            Cardiac testing:   Results for orders placed during the hospital encounter of 20    Echo complete with contrast if indicated    [id number] [place], [place] [id number] [phone]    Transthoracic Echocardiogram  2D, M-mode, Doppler, and Color Doppler    Study date:  24-Sep-2020    Patient: MERE MARROQUIN  MR number: [id number]  Account number: [id number]  : 1970  Age: 50 years  Gender: Male  Status: Outpatient  Location: [facility]  Height: 69 in  Weight: 202.6 lb  BP: 98/ 60 mmHg    Indications: Assess left ventricular function.    Diagnoses: I42.0 - Dilated cardiomyopathy    Sonographer:  Kassi Sosa RDCS  Primary Physician:  Alden Villa MD  Referring Physician:  Raffaele Ruiz DO  Group:  [facility]  Interpreting Physician:  Arsh Guillory MD    SUMMARY    LEFT VENTRICLE:  The ventricle was mildly dilated.  Systolic function was mildly reduced. Ejection fraction was estimated to be 45 %.  There was mild diffuse hypokinesis.  Wall thickness was mildly increased.  The changes were consistent with eccentric hypertrophy.  Doppler parameters were consistent with abnormal left ventricular relaxation (grade 1 diastolic dysfunction).    LEFT ATRIUM:  The atrium was mildly dilated.    RIGHT ATRIUM:  The  atrium was mildly dilated.    MITRAL VALVE:  There was trace regurgitation.    HISTORY: PRIOR HISTORY: DCM, hyperlipidemia, PVC    PROCEDURE: The study was performed in the [facility]. This was a routine study. The transthoracic approach was used. The study included complete 2D imaging, M-mode, complete spectral Doppler, and color Doppler. The heart rate was  68 bpm, at the start of the study. Images were obtained from the parasternal, apical, subcostal, and suprasternal notch acoustic windows. Image quality was adequate.    LEFT VENTRICLE: The ventricle was mildly dilated. Systolic function was mildly reduced. Ejection fraction was estimated to be 45 %. There was mild diffuse hypokinesis. Wall thickness was mildly increased. The changes were consistent with  eccentric hypertrophy. DOPPLER: Doppler parameters were consistent with abnormal left ventricular relaxation (grade 1 diastolic dysfunction).    RIGHT VENTRICLE: The size was normal. Systolic function was normal. Wall thickness was normal.    LEFT ATRIUM: The atrium was mildly dilated.    RIGHT ATRIUM: The atrium was mildly dilated.    MITRAL VALVE: Valve structure was normal. There was normal leaflet separation. DOPPLER: The transmitral velocity was within the normal range. There was no evidence for stenosis. There was trace regurgitation.    AORTIC VALVE: The valve was trileaflet. Leaflets exhibited normal thickness and normal cuspal separation. DOPPLER: Transaortic velocity was within the normal range. There was no evidence for stenosis. There was no significant  regurgitation.    TRICUSPID VALVE: The valve structure was normal. There was normal leaflet separation. DOPPLER: The transtricuspid velocity was within the normal range. There was no evidence for stenosis. There was no significant regurgitation.    PULMONIC VALVE: Leaflets exhibited normal thickness, no calcification, and normal cuspal separation. DOPPLER: The transpulmonic velocity was  within the normal range. There was trace regurgitation.    PERICARDIUM: There was no pericardial effusion. The pericardium was normal in appearance.    AORTA: The root exhibited normal size.    SYSTEMIC VEINS: IVC: The inferior vena cava was normal in size.    SYSTEM MEASUREMENT TABLES    2D  %FS: 23.46 %  AV Diam: 3.39 cm  EDV(Teich): 193.01 ml  EF Biplane: 49.32 %  EF(Teich): 46.04 %  ESV(Teich): 104.15 ml  IVSd: 1.12 cm  LA Area: 24.53 cm2  LA Diam: 3.23 cm  LVEDV MOD A2C: 269.69 ml  LVEDV MOD A4C: 264.33 ml  LVEDV MOD BP: 266.92 ml  LVEF MOD A2C: 49.8 %  LVEF MOD A4C: 50.43 %  LVESV MOD A2C: 135.4 ml  LVESV MOD A4C: 131.02 ml  LVESV MOD BP: 135.28 ml  LVIDd: 6.19 cm  LVIDs: 4.73 cm  LVLd A2C: 9.7 cm  LVLd A4C: 9.63 cm  LVLs A2C: 8.69 cm  LVLs A4C: 8.41 cm  LVPWd: 1.25 cm  RA Area: 20.63 cm2  RV Diam.: 4.06 cm  SI(Teich): 42.72 ml/m2  SV MOD A2C: 134.3 ml  SV MOD A4C: 133.31 ml  SV(Cube): 130.6 ml  SV(Teich): 88.86 ml    MM  TAPSE: 2.22 cm    PW  E': 0.04 m/s  E/E': 12.37  MV A Wero: 0.72 m/s  MV Dec [place]: 4.12 m/s2  MV DecT: 134.15 ms  MV E Wero: 0.55 m/s  MV E/A Ratio: 0.77    [employer] Accredited Echocardiography Laboratory    Prepared and electronically signed by    Arsh Guillory MD  Signed 24-Sep-2020 12:32:05    No results found for this or any previous visit.    No results found for this or any previous visit.    No results found for this or any previous visit.

## 2024-09-30 ENCOUNTER — APPOINTMENT (OUTPATIENT)
Dept: LAB | Age: 54
End: 2024-09-30
Payer: MEDICARE

## 2024-09-30 DIAGNOSIS — G56.01 CARPAL TUNNEL SYNDROME ON RIGHT: ICD-10-CM

## 2024-09-30 DIAGNOSIS — M72.2 PLANTAR FASCIAL FIBROMATOSIS: ICD-10-CM

## 2024-09-30 DIAGNOSIS — G62.9 PERIPHERAL NERVE DISORDER: ICD-10-CM

## 2024-09-30 LAB — VIT B12 SERPL-MCNC: 376 PG/ML (ref 180–914)

## 2024-09-30 PROCEDURE — 36415 COLL VENOUS BLD VENIPUNCTURE: CPT

## 2024-09-30 PROCEDURE — 84207 ASSAY OF VITAMIN B-6: CPT

## 2024-09-30 PROCEDURE — 82607 VITAMIN B-12: CPT

## 2024-09-30 PROCEDURE — 82747 ASSAY OF FOLIC ACID RBC: CPT

## 2024-09-30 PROCEDURE — 84591 ASSAY OF NOS VITAMIN: CPT

## 2024-10-02 LAB
FOLATE BLD-MCNC: 451 NG/ML
FOLATE RBC-MCNC: 947 NG/ML
HCT VFR BLD AUTO: 47.6 % (ref 37.5–51)

## 2024-10-04 ENCOUNTER — OFFICE VISIT (OUTPATIENT)
Dept: PODIATRY | Facility: CLINIC | Age: 54
End: 2024-10-04
Payer: MEDICARE

## 2024-10-04 VITALS
SYSTOLIC BLOOD PRESSURE: 113 MMHG | BODY MASS INDEX: 30.36 KG/M2 | HEART RATE: 59 BPM | TEMPERATURE: 97.3 F | DIASTOLIC BLOOD PRESSURE: 74 MMHG | WEIGHT: 205 LBS | HEIGHT: 69 IN

## 2024-10-04 DIAGNOSIS — Q66.71 PES CAVUS OF BOTH FEET: ICD-10-CM

## 2024-10-04 DIAGNOSIS — M72.2 PLANTAR FASCIITIS: Primary | ICD-10-CM

## 2024-10-04 DIAGNOSIS — Q66.72 PES CAVUS OF BOTH FEET: ICD-10-CM

## 2024-10-04 PROCEDURE — 20550 NJX 1 TENDON SHEATH/LIGAMENT: CPT | Performed by: PODIATRIST

## 2024-10-04 PROCEDURE — 99204 OFFICE O/P NEW MOD 45 MIN: CPT | Performed by: PODIATRIST

## 2024-10-04 RX ORDER — DEXAMETHASONE SODIUM PHOSPHATE 4 MG/ML
4 INJECTION, SOLUTION INTRA-ARTICULAR; INTRALESIONAL; INTRAMUSCULAR; INTRAVENOUS; SOFT TISSUE ONCE
Status: COMPLETED | OUTPATIENT
Start: 2024-10-04 | End: 2024-10-04

## 2024-10-04 RX ORDER — BUPIVACAINE HYDROCHLORIDE 5 MG/ML
0.5 INJECTION, SOLUTION PERINEURAL ONCE
Status: COMPLETED | OUTPATIENT
Start: 2024-10-04 | End: 2024-10-04

## 2024-10-04 RX ORDER — TRIAMCINOLONE ACETONIDE 40 MG/ML
40 INJECTION, SUSPENSION INTRA-ARTICULAR; INTRAMUSCULAR ONCE
Status: COMPLETED | OUTPATIENT
Start: 2024-10-04 | End: 2024-10-04

## 2024-10-04 RX ADMIN — BUPIVACAINE HYDROCHLORIDE 0.5 ML: 5 INJECTION, SOLUTION PERINEURAL at 12:05

## 2024-10-04 RX ADMIN — DEXAMETHASONE SODIUM PHOSPHATE 4 MG: 4 INJECTION, SOLUTION INTRA-ARTICULAR; INTRALESIONAL; INTRAMUSCULAR; INTRAVENOUS; SOFT TISSUE at 12:06

## 2024-10-04 RX ADMIN — TRIAMCINOLONE ACETONIDE 40 MG: 40 INJECTION, SUSPENSION INTRA-ARTICULAR; INTRAMUSCULAR at 12:06
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
## 2024-10-05 LAB — VIT B6 SERPL-MCNC: 35.4 UG/L (ref 3.4–65.2)

## 2024-10-07 LAB
NIACIN SERPL-MCNC: <5 NG/ML (ref 0–5)
NICOTINAMIDE SERPL-MCNC: 6.7 NG/ML (ref 5.2–72.1)

## 2024-12-13 NOTE — TELEPHONE ENCOUNTER
Continued Stay SW/CM Assessment/Plan of Care Note     Active Substitute Decision Maker (SDM)       JAMSHID GARCIA Health Care Agent 2 - Daughter   Primary Phone: [phone] (Mobile)             PAULINO GARCIA Health Care Agent 1 - Daughter   Primary Phone: [phone] (Mobile)                 Progress note:  SW continues to follow for DC planning. PT will need to secure T-19 and LTC coverage in order to secure a LTC placement.    See SW/CM flowsheets for other objective data.    Disposition Recommendations:  Preliminary discharge destination: Planned Discharge Destination: Location not determined at this time      Continued Care and Services - Admitted Since 11/14/2024       Destination        Service Provider Request Status Selected Services Address Phone Fax    [facility] - SNF PAN Pending - Request Sent [facility] [address] [facility] [facility] [address] [phone]    [facility] SNF PAN Pending - Request Sent -- [address], [facility] [address] [phone]    [facility] Pending - Request Sent [facility] [address] [facility] [facility] [address] [phone]    [facility] PAN Declined [facility] [address] [facility] [address] [phone]    [facility] SNF PAN Declined  Bed not available -- [address] [place] [address] [phone]    [facility] - SN[facility] PAN Declined  Bed not available [facility] [address] [facility] [address] [phone]                  Prior To Hospitalization:    Living Situation: Alone and residing at Senior apartment    .  Support Systems: Neighbors, Family members     Current Function  Last Filed Values         Value Time User    Current Function  at baseline level of function  New baseline due to cognitive deficits that are unlikely to  S/w pt, advised of the same. Pt verbalized understanding and appreciation.    change.  Pt would benefit from supportive living environment to optimize safety/reduce fall risk at discharge. 12/10/2024  3:19 PM Mary Grace Garcia, PT    Therapy Impairments  cognition; safety awareness 12/3/2024  3:06 PM Leslie Prieto, PT    ADLs Requiring Support  ambulation; stairs 12/3/2024  3:06 PM Leslie Prieto, PT          OT:       Last Filed Values         Value Time User    OT Discharge Needs  therapy 1-3 times per week  POA activiated who will determine discharge status. 12/7/2024  2:40 PM Cristy Roe, OT          Previous Living Environment: Pt was living alone at his apartment     HCPOA:  Pt does have a HCPOA on file.  The document is active.  Pt's APOA is Suzanne Phelps (daughter) [phone]     Community Care Team or Supports: Pt does not have any community supports in place.     BARRIERS TO DISCHARGE:   BRIANNA/LTC     PATRICE Andino, CAPSW, SAC-IT       phone [phone]

## 2025-01-16 ENCOUNTER — TELEPHONE (OUTPATIENT)
Age: 55
End: 2025-01-16

## 2025-01-16 NOTE — TELEPHONE ENCOUNTER
Pt called stating that he has been doing some research about creatine and how it is helpful to take with those who have heart failure. He is asking for the cardiologist input on this supplment     Please advise

## 2025-01-27 ENCOUNTER — TELEPHONE (OUTPATIENT)
Age: 55
End: 2025-01-27

## 2025-01-27 NOTE — TELEPHONE ENCOUNTER
"Reached out to Gallo to discuss his concerns.     He states about 2 weeks ago he started having symptoms of carpal tunnel again and would like to return for re-evaluation. He states his \"two middle fingers are numb\" especially when he repositions from the dependant position. He has to let his arm hang for the symptoms to subside. He states he does not have juvenal weakness but does have trouble with fine motor.     He has had no recent events such as falls or trauma. There is no opening with Dr. Bassett until March. Will route a message to the provider bin to see if there is anything to do in the meantime.     Will contact him back to advise.   "

## 2025-01-27 NOTE — TELEPHONE ENCOUNTER
Received the following response from ADRIANNE GARCIA:    Kezia Gill PA-C to Me  [facility] [place] Provider       1/27/25  1:54 PM  He can come see an AP    Assisted to schedule next available appt. He was appreciative.

## 2025-01-27 NOTE — TELEPHONE ENCOUNTER
Pt called in to speak to a nurse about symptoms that are now back and worsening. Pts LOV was 9/12/24 with DKO for Post carpal tunnel surgery on 8/2/24.    Pt says his symptoms from before surgery are back and impacting his sleep and daily life. He would like to be seen by DKO ASAP.    Please call when available.    Thank you.

## 2025-01-29 PROBLEM — R20.2 PARESTHESIAS: Status: ACTIVE | Noted: 2025-01-29

## 2025-01-29 NOTE — ASSESSMENT & PLAN NOTE
Returns for evaluation of RUE symptoms  Started about 3 weeks ago unprovoked.  There is constant numbness and tingling in the medial half of the right third finger and the lateral half of the fourth finger into the webspace as well as into the palm and towards the thumb.  Occasionally stabbing pain in the palm.  This will even wake him up at night. He feels that dexterity is off in the right hand.   S/p right CTR by Dr. Bassett 8/2/24  S/p C6-T1 ACDF with peek cage allograft fusion and plate fixation 3/21/14 by Dr. Bassett     Plan:  Reviewed current symptoms with patient and wife.   Discussed that although the typical symptoms of carpal tunnel involve the thumb, index, and middle fingers, since he has had surgery is may be slightly off for him.  Discussed that he could have scar tissue versus a recurrence.  Since he is not having any neck pain or arm symptoms, less likely related to his previous cervical spine surgery.  Recommend right upper extremity EMG  Follow-up with Dr. Bassett once imaging completed.  Call sooner with any questions or concerns.    Orders:    EMG 1 Limb; Future

## 2025-01-30 ENCOUNTER — OFFICE VISIT (OUTPATIENT)
Dept: NEUROSURGERY | Facility: CLINIC | Age: 55
End: 2025-01-30
Payer: MEDICARE

## 2025-01-30 ENCOUNTER — TELEPHONE (OUTPATIENT)
Dept: NEUROSURGERY | Facility: CLINIC | Age: 55
End: 2025-01-30

## 2025-01-30 VITALS
RESPIRATION RATE: 16 BRPM | BODY MASS INDEX: 30.36 KG/M2 | HEIGHT: 69 IN | DIASTOLIC BLOOD PRESSURE: 58 MMHG | OXYGEN SATURATION: 97 % | SYSTOLIC BLOOD PRESSURE: 102 MMHG | HEART RATE: 65 BPM | TEMPERATURE: 98.5 F | WEIGHT: 205 LBS

## 2025-01-30 DIAGNOSIS — R20.2 PARESTHESIAS: Primary | ICD-10-CM

## 2025-01-30 PROCEDURE — 99214 OFFICE O/P EST MOD 30 MIN: CPT | Performed by: PHYSICIAN ASSISTANT
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
## 2025-01-30 NOTE — TELEPHONE ENCOUNTER
1/30/25 Patient was seen today by Neurosurgery and he is scheduled for an EMG on 4/7/25 at the [facility] he is willing to go anywhere sooner message sent to Susy in the EMG requesting a sooner appointment. Patient advised that they will call him to move up appointment.

## 2025-01-30 NOTE — TELEPHONE ENCOUNTER
Pt called in and was able to get an EMG appt for 1/31/25 iN CV with his PM&R provider Dr. Farias. He will have results sent to us as they are not in [facility]. Move pts DKO appt to 3/11/25 @ 1:45 and added pt to high priority wait list.

## 2025-01-30 NOTE — TELEPHONE ENCOUNTER
Patient called to advise that he is having his Emg at [place] Dr. Farias on 1/30/25 and was going to cancel the emg with [facility].     Message received from call center stating:    Pt called in and was able to get an EMG appt for 1/31/25 iN CV with his PM&R provider Dr. Farias. He will have results sent to [facility] as [facility]. Move pts DKO appt to 3/11/25 @ 1:45 and added pt to high priority wait list.

## 2025-01-31 NOTE — TELEPHONE ENCOUNTER
PT CALLED TO CONFIRM IF EMG REPORT WAS RECEIVED YET FROM HIS PHYSIATRIST FOR DR PERES TO REVIEW, AND ALSO REQUESTED TO MOVE APPT UP ASA AS PT STATES HE IS GOING TO NEED [facility].    ADVISED PT THAT APPT HAS ALREADY BEEN MOVED UP TO 3/11/2025 WITH PLACEMENT AS A HIGH PRIORITY ON WAITLIST.    PT STATED MARCH IS AN UNACCEPTABLE WAIT TIME FOR THIS ISSUE AND REQUESTED A MESSAGE BE SENT TO [employer] FOR THE POSSIBILITY OF ANY EARLIER APPT AS PT'S JOB AND LIVELIHOOD DEPEND ON THE ABILITY TO USE HIS HANDS.    PT STATED HE HAS A PHYSICAL COPY OF HIS EMG REPORT AND WILL BRING IT TO THE [facility] OFFICE TODAY 1/31/2025 IN AN ATTEMPT TO EXPEDITE MAKING THIS EMG AVAILABLE FOR DR PERES TO REVIEW.

## 2025-02-05 NOTE — TELEPHONE ENCOUNTER
Emaile received from Susy in emg dept advising that Pt cancelled 4/7/25, had test done 1/31/25 elsewhere.

## 2025-02-05 NOTE — TELEPHONE ENCOUNTER
PT CALLED TO CONFIRM HE IS ON DR PERES'S HIGH PRIORITY WAITLIST AFTER SEEING A MISSED EMAIL ABOUT A POTENTIAL OPENING IN DR PERES'S SCHEDULE.    CONFIRMED [employer] HOTLINE WITH [phone] OPTION 5 TO ENSURE TEXT MESSAGE NOTIFICATIONS ARE BEING RECEIVED IN ADDITION TO EMAIL NOTIFICATIONS.    PT WAS APPRECIATIVE

## 2025-02-06 NOTE — TELEPHONE ENCOUNTER
Pt called to verify he was on waitlist, went through his comm preferences and he is active for appts and we changed the opt in for notifications also at pt request  Patient appreciative of assistance.

## 2025-03-04 ENCOUNTER — OFFICE VISIT (OUTPATIENT)
Dept: NEUROSURGERY | Facility: CLINIC | Age: 55
End: 2025-03-04
Payer: MEDICARE

## 2025-03-04 VITALS
BODY MASS INDEX: 31.1 KG/M2 | HEART RATE: 54 BPM | DIASTOLIC BLOOD PRESSURE: 72 MMHG | WEIGHT: 210 LBS | OXYGEN SATURATION: 99 % | TEMPERATURE: 96.8 F | RESPIRATION RATE: 16 BRPM | SYSTOLIC BLOOD PRESSURE: 110 MMHG | HEIGHT: 69 IN

## 2025-03-04 DIAGNOSIS — Z79.899 ENCOUNTER FOR LONG-TERM (CURRENT) USE OF MEDICATIONS: ICD-10-CM

## 2025-03-04 DIAGNOSIS — Z01.812 PRE-OPERATIVE LABORATORY EXAMINATION: ICD-10-CM

## 2025-03-04 DIAGNOSIS — G56.01 RIGHT CARPAL TUNNEL SYNDROME: Primary | ICD-10-CM

## 2025-03-04 DIAGNOSIS — Z79.01 LONG TERM (CURRENT) USE OF ANTICOAGULANTS: ICD-10-CM

## 2025-03-04 PROCEDURE — 99213 OFFICE O/P EST LOW 20 MIN: CPT | Performed by: NEUROLOGICAL SURGERY

## 2025-03-04 RX ORDER — METHYLPREDNISOLONE 4 MG/1
TABLET ORAL
Qty: 1 EACH | Refills: 0 | Status: SHIPPED | OUTPATIENT
Start: 2025-03-04
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
## 2025-03-04 NOTE — ASSESSMENT & PLAN NOTE
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  He had a carpal tunnel release with a improvement following this but has since worsened.  An EMG nerve conduction study demonstrates worsening.  I have recommended a Medrol Dosepak and a cock up splint and an ultrasound to inspect the nerve as well as the carpal arch.  Will follow-up with him virtually.  Orders:    methylPREDNISolone 4 MG tablet therapy pack; Use as directed on package    Cock Up Wrist Splint    US MSK limited; Future

## 2025-03-07 ENCOUNTER — HOSPITAL ENCOUNTER (OUTPATIENT)
Dept: ULTRASOUND IMAGING | Facility: HOSPITAL | Age: 55
Discharge: HOME | End: 2025-03-07
Attending: NEUROLOGICAL SURGERY
Payer: MEDICARE

## 2025-03-07 DIAGNOSIS — G56.01 CARPAL TUNNEL SYNDROME ON RIGHT: ICD-10-CM

## 2025-03-07 PROCEDURE — 76882 US LMTD JT/FCL EVL NVASC XTR: CPT

## 2025-03-11 ENCOUNTER — RESULTS FOLLOW-UP (OUTPATIENT)
Dept: NEUROSURGERY | Facility: CLINIC | Age: 55
End: 2025-03-11

## 2025-03-11 ENCOUNTER — TELEPHONE (OUTPATIENT)
Age: 55
End: 2025-03-11

## 2025-03-11 RX ORDER — CEFAZOLIN SODIUM 2 G/50ML
2000 SOLUTION INTRAVENOUS ONCE
OUTPATIENT
Start: 2025-03-11 | End: 2025-03-11

## 2025-03-11 RX ORDER — CHLORHEXIDINE GLUCONATE ORAL RINSE 1.2 MG/ML
15 SOLUTION DENTAL ONCE
OUTPATIENT
Start: 2025-03-11 | End: 2025-03-11

## 2025-03-11 NOTE — TELEPHONE ENCOUNTER
This pt called back after speaking to Dr Aparicio and he has one additional question. Pt finished his steroids but he feels he did not get any relief from them like he has in the past.  Can you give him a call at  to see if he should be continuing with another med?

## 2025-03-11 NOTE — TELEPHONE ENCOUNTER
Called patient  To let him know about the results of his ultrasound which demonstrated recurrent carpal tunnel    I recommended repeat carpal tunnel release with detailed dissection and use of a cock up splint and reduced activity of that hand for at least 6 weeks postoperatively    We rediscussed these findings and the need to reduce his activities.  He is passionate about his garden and we also suggested perhaps using the splint during his gardening to reduce those activities that are most associated with recurrence    He has informed questions reflecting his understanding would like to proceed.

## 2025-03-11 NOTE — TELEPHONE ENCOUNTER
PT CALLED INQUIRING IF THERE WERE ANY UPDATES ON SCHEDULING HIS CARPAL TUNNEL SURGERY AS DISCUSSED WITH DR PERES EARLIER TODAY 3/11/2025.    ADVISED PT THAT AT THIS TIME NO DATE HAS BEEN SET BUT OFFERED TO TRANSFER PT TO [facility] TO DISCUSS FURTHER.    PT DECLINED AND ASKED FOR A CB AT [facility]'S EARLIEST CONVENIENCE TO BEGIN GOING OVER SCHEDULING DETAILS FOR CARPAL TUNNEL SURGERY.    PT WAS APPRECIATIVE OF THE UPDATE.

## 2025-03-12 NOTE — TELEPHONE ENCOUNTER
Pt called to see if appt 3/13/25 is necessary since he has decided to move forward, messaged with Ernestina who will check pt case and return call to pt.

## 2025-03-12 NOTE — TELEPHONE ENCOUNTER
03/12/2025-Called pt, offered 04/11 sx date w/DKO but pt wants to work on his garden and scheduled sx for 06/18/2025.  Pt keeping 06/13 apt w/DKO to re-discuss sx, sign consent, and collect soap, wipe, booklet.  Scheduled POV apt's w/pt.

## 2025-03-13 ENCOUNTER — OFFICE VISIT (OUTPATIENT)
Dept: NEUROSURGERY | Facility: CLINIC | Age: 55
End: 2025-03-13
Payer: MEDICARE

## 2025-03-13 VITALS
DIASTOLIC BLOOD PRESSURE: 70 MMHG | OXYGEN SATURATION: 97 % | HEIGHT: 69 IN | HEART RATE: 72 BPM | SYSTOLIC BLOOD PRESSURE: 118 MMHG | WEIGHT: 210 LBS | TEMPERATURE: 96.6 F | BODY MASS INDEX: 31.1 KG/M2

## 2025-03-13 DIAGNOSIS — G56.01 RIGHT CARPAL TUNNEL SYNDROME: Primary | ICD-10-CM

## 2025-03-13 PROCEDURE — 99213 OFFICE O/P EST LOW 20 MIN: CPT | Performed by: NEUROLOGICAL SURGERY
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
## 2025-03-13 NOTE — ASSESSMENT & PLAN NOTE
Patient returns for a discussion about right carpal tunnel release again.  He had a myriad of questions all of which were answered.  His ultrasound and EMG nerve conduction study both support recurrent carpal tunnel syndrome.  He failed to improve with a Medrol Dosepak.  A cock up splint also has produced no relief of his symptoms.  The risks, benefits and complications of surgery were explained in detail to Gallo Gallegos and his wife including hemorrhage, infection, CSF leakage, wound problems, pain, weakness, numbness, dysesthesiae, paralysis, coma, and death. Also, the possibility  of further surgery being required was emphasized.     Other potential medical complications were outlined, including deep venous thrombosis, pulmonary embolism, pneumonia, urinary tract infection, myocardial infarction,  and stroke.     The need for physical therapy, occupational therapy, and rehabilitation was also mentioned. The alternatives to surgery were discussed. Gallo Gallegos and his wife asked relevant questions and asked that we proceed with arrangements for surgery.

## 2025-05-09 NOTE — DISCHARGE INSTR - AVS FIRST PAGE
Maintain dressing until follow up appointment   No lifting with the right arm   Contact the neurosurgery office with additional questions or concerns   PROGRESS NOTE     This visit is being performed virtually via Video visit using [employer] ([employer]) Crystal.   Clinical Location: [place]  Codie is physically present in the [facility] at the time of this visit.      Subjective     Codie is a 22 year old here for Sleep Problem (Would like referral for sleep study parents have been dx recently has fatigue. ) and Video Visit   The patient is a 22-year-old female presenting via virtual visit for sleep issues and ADHD concerns.    She reports sleep disturbances with an irregular sleep schedule and persistent fatigue despite maintaining sleep. Her boyfriend has observed snoring, but it does not disrupt his sleep. She does not recall gasping for air or being awakened by snoring. She is interested in a sleep study due to her father's sleep apnea diagnosis and potential symptom overlap with ADHD. Her father and brother have sleep apnea; her mother has heart issues and may need testing for sleep apnea.    She sought an ADHD evaluation referral 4-5 years ago, delayed by the COVID-19 pandemic. She seeks advice on whether a new referral is needed. She struggled with focus during school, but maintained good grades and performed well in online college. As a , she frequently loses focus at work. She has no youth diagnoses and inconsistently took Wellbutrin, prescribed for weight loss, not depression or anxiety. No history of seizures or head injuries.    SOCIAL HISTORY  She does not smoke.    FAMILY HISTORY  Father and brother have sleep apnea. Mother has heart issues and may need sleep apnea testing.    Review of Systems  As documented above.    SDOH Never Smoker          Objective   There were no vitals filed for this visit.  Physical Exam  General: Alert in no acute distress.  Respiratory: Normal respiratory effort and verbally interactive.  Psychiatric: Mood is appropriate for situation  She measured her neck circumference and it was 15  inches           ASSESSMENT AND PLAN          1. Sleep concern  2. Attention and concentration deficit  -     SERVICE TO BEHAVIORAL HEALTH  3. Obesity, morbid, BMI 40.0-49.9  (CMD)      Return in about 4 months (around 2025).        Emmons Sleepiness Scale (ESS)     Indicate the likelihood of dozing in the following situations using the following scale:     0 = would never doze  1 = slight chance of dozing  2 = moderate chance of dozing  3 = high chance of dozing     Sitting and readin  Watching TV: 3  Sitting, inactive in a public place: 1  As a passenger in a car for an hour without a break: 2  Lying down to rest in the afternoon when circumstances permit: 3  Sitting and talking to someone: 1  Sitting quietly after lunch without alcohol: 1  In a car, while stopped for a few minutes in traffic: 0     Total:      Adult ADHD screening tool    How often do you have trouble wrapping up the final details of a project once the challenging pars have been done?    Never   Rarely  Sometimes   X Often   Very Often     2. How often do you have difficulty getting thing in order when you have to do a task what requires organization?     Never   Rarely  Sometimes   XOften   Very Often      3. How often do ou have problems remembering appointments or obligations?     Never   Rarely  Sometimes   Often   XVery Often     4. When you have a task that requires a lot of thought, how often do you avoid or delay getting started?     Never   Rarely  Sometimes   Often   XVery Often       5 How often do you fidget or squirm with your hands or feet when you have to sit down for a long time?    Never   Rarely  Sometimes   Often   XVery Often     6. How often do you feel overly active and compelled to do things, like you were driven by the motor?    Never   Rarely  XSometimes   Often   Very Often        1. Sleep issues  - Emmons Sleepiness Scale score: 13 (moderate sleepiness)  - Neck circumference: 15 inches; BMI: 43  - Order  home sleep study; nurse to explain process and equipment next week  - If sleep apnea is indicated, refer to sleep medicine specialist for evaluation and potential CPAP titration    2. ADHD  - High score on ADHD questionnaire  - History of difficulty focusing and organizing tasks in school and work  - Initiate referral to behavioral health for ADHD evaluation  - If appointment is delayed, consider starting bupropion at home  - If seen within a couple of months, avoid bupropion to prevent interference with evaluation    3. Obesity  Discussed the relation of obesity to sleep apnea.  Encouraged diet and exercise.    I spent a total of 31 minutes on the day of the visit.  This includes pre-charting, chart review, documenting, and counseling.

## 2025-05-15 ENCOUNTER — TELEPHONE (OUTPATIENT)
Age: 55
End: 2025-05-15

## 2025-05-15 NOTE — TELEPHONE ENCOUNTER
Pt called stating he has begun inversion therapy for spinal cord injury.   He states there is some concern re: the impact of this therapy on the cardiovascular system.    He has done up to 30 degree inversions at this time but has not completely inverted.  He is asking for Dr. Ruiz's recommendation re: this before proceeding.    (Pt is scheduled for OV w/ Dr. Ruiz 5/22 but is asking for advice prior to visit. Ok to leave message if pt does not answer when you call back.)

## 2025-05-22 ENCOUNTER — OFFICE VISIT (OUTPATIENT)
Dept: CARDIOLOGY CLINIC | Facility: CLINIC | Age: 55
End: 2025-05-22
Payer: MEDICARE

## 2025-05-22 VITALS
WEIGHT: 218 LBS | DIASTOLIC BLOOD PRESSURE: 62 MMHG | SYSTOLIC BLOOD PRESSURE: 98 MMHG | OXYGEN SATURATION: 98 % | BODY MASS INDEX: 32.29 KG/M2 | HEART RATE: 68 BPM | HEIGHT: 69 IN

## 2025-05-22 DIAGNOSIS — E78.2 MIXED HYPERLIPIDEMIA: ICD-10-CM

## 2025-05-22 DIAGNOSIS — I42.9 CARDIOMYOPATHY, UNSPECIFIED TYPE (HCC): ICD-10-CM

## 2025-05-22 DIAGNOSIS — Z01.810 PRE-OPERATIVE CARDIOVASCULAR EXAMINATION: Primary | ICD-10-CM

## 2025-05-22 DIAGNOSIS — I49.3 PREMATURE VENTRICULAR CONTRACTION: ICD-10-CM

## 2025-05-22 DIAGNOSIS — Z01.810 PREOP CARDIOVASCULAR EXAM: ICD-10-CM

## 2025-05-22 DIAGNOSIS — D69.6 PLATELETS DECREASED (HCC): ICD-10-CM

## 2025-05-22 DIAGNOSIS — I50.22 CHRONIC SYSTOLIC CONGESTIVE HEART FAILURE (HCC): ICD-10-CM

## 2025-05-22 LAB
ATRIAL RATE: 68 BPM
P AXIS: 51 DEGREES
PR INTERVAL: 180 MS
QRS AXIS: 238 DEGREES
QRSD INTERVAL: 162 MS
QT INTERVAL: 422 MS
QTC INTERVAL: 448 MS
T WAVE AXIS: 68 DEGREES
VENTRICULAR RATE: 68 BPM

## 2025-05-22 PROCEDURE — 93000 ELECTROCARDIOGRAM COMPLETE: CPT | Performed by: INTERNAL MEDICINE

## 2025-05-22 PROCEDURE — 99204 OFFICE O/P NEW MOD 45 MIN: CPT | Performed by: INTERNAL MEDICINE
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
## 2025-05-22 NOTE — LETTER
Cardiology Pre Operative Risk Assessment      PRE OPERATIVE CARDIAC RISK ASSESSMENT    05/22/25    Gallo Gallegos  1970  [id number]    Date of Surgery: 06/18/2025    Type of Surgery: RELEASE CARPAL TUNNEL (Right: Wrist)     Surgeon: Dilan Bassett MD     No Cardiac Contraindication for Planned Surgical Procedures    Anticoagulation: no    Physician Comment: low cv risk    Electronically Signed: alessandra
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
## 2025-05-28 ENCOUNTER — TELEPHONE (OUTPATIENT)
Age: 55
End: 2025-05-28

## 2025-05-28 NOTE — TELEPHONE ENCOUNTER
Patient calling, asking questions about fluid restriction recommendations with CHF. Advised patient that 48-64 oz fluid/day is recommended fluid restriction, unless he is told otherwise by cardiology. Patient verbalized understanding.

## 2025-06-04 ENCOUNTER — APPOINTMENT (OUTPATIENT)
Dept: LAB | Age: 55
End: 2025-06-04
Payer: MEDICARE

## 2025-06-04 DIAGNOSIS — G56.01 RIGHT CARPAL TUNNEL SYNDROME: ICD-10-CM

## 2025-06-04 DIAGNOSIS — Z01.812 PRE-OPERATIVE LABORATORY EXAMINATION: ICD-10-CM

## 2025-06-04 DIAGNOSIS — Z79.01 LONG TERM (CURRENT) USE OF ANTICOAGULANTS: ICD-10-CM

## 2025-06-04 DIAGNOSIS — Z79.899 ENCOUNTER FOR LONG-TERM (CURRENT) USE OF MEDICATIONS: ICD-10-CM

## 2025-06-04 LAB
ALBUMIN SERPL BCG-MCNC: 4.4 G/DL (ref 3.5–5)
ALP SERPL-CCNC: 35 U/L (ref 34–104)
ALT SERPL W P-5'-P-CCNC: 28 U/L (ref 7–52)
ANION GAP SERPL CALCULATED.3IONS-SCNC: 9 MMOL/L (ref 4–13)
APTT PPP: 30 SECONDS (ref 23–34)
AST SERPL W P-5'-P-CCNC: 30 U/L (ref 13–39)
BACTERIA UR QL AUTO: ABNORMAL /HPF
BASOPHILS # BLD AUTO: 0.02 THOUSANDS/ÂΜL (ref 0–0.1)
BASOPHILS NFR BLD AUTO: 1 % (ref 0–1)
BILIRUB SERPL-MCNC: 1.01 MG/DL (ref 0.2–1)
BILIRUB UR QL STRIP: NEGATIVE
BUN SERPL-MCNC: 18 MG/DL (ref 5–25)
CALCIUM SERPL-MCNC: 9.2 MG/DL (ref 8.4–10.2)
CHLORIDE SERPL-SCNC: 106 MMOL/L (ref 96–108)
CLARITY UR: CLEAR
CO2 SERPL-SCNC: 29 MMOL/L (ref 21–32)
COLOR UR: YELLOW
CREAT SERPL-MCNC: 0.86 MG/DL (ref 0.6–1.3)
EOSINOPHIL # BLD AUTO: 0.16 THOUSAND/ÂΜL (ref 0–0.61)
EOSINOPHIL NFR BLD AUTO: 4 % (ref 0–6)
ERYTHROCYTE [DISTWIDTH] IN BLOOD BY AUTOMATED COUNT: 12.6 % (ref 11.6–15.1)
EST. AVERAGE GLUCOSE BLD GHB EST-MCNC: 108 MG/DL
GFR SERPL CREATININE-BSD FRML MDRD: 96 ML/MIN/1.73SQ M
GLUCOSE P FAST SERPL-MCNC: 112 MG/DL (ref 65–99)
GLUCOSE UR STRIP-MCNC: NEGATIVE MG/DL
HBA1C MFR BLD: 5.4 %
HCT VFR BLD AUTO: 44.5 % (ref 36.5–49.3)
HGB BLD-MCNC: 15.1 G/DL (ref 12–17)
HGB UR QL STRIP.AUTO: NEGATIVE
IMM GRANULOCYTES # BLD AUTO: 0.01 THOUSAND/UL (ref 0–0.2)
IMM GRANULOCYTES NFR BLD AUTO: 0 % (ref 0–2)
INR PPP: 0.99 (ref 0.85–1.19)
KETONES UR STRIP-MCNC: NEGATIVE MG/DL
LEUKOCYTE ESTERASE UR QL STRIP: NEGATIVE
LYMPHOCYTES # BLD AUTO: 1.8 THOUSANDS/ÂΜL (ref 0.6–4.47)
LYMPHOCYTES NFR BLD AUTO: 46 % (ref 14–44)
MCH RBC QN AUTO: 31.8 PG (ref 26.8–34.3)
MCHC RBC AUTO-ENTMCNC: 33.9 G/DL (ref 31.4–37.4)
MCV RBC AUTO: 94 FL (ref 82–98)
MONOCYTES # BLD AUTO: 0.49 THOUSAND/ÂΜL (ref 0.17–1.22)
MONOCYTES NFR BLD AUTO: 13 % (ref 4–12)
MUCOUS THREADS UR QL AUTO: ABNORMAL
NEUTROPHILS # BLD AUTO: 1.37 THOUSANDS/ÂΜL (ref 1.85–7.62)
NEUTS SEG NFR BLD AUTO: 36 % (ref 43–75)
NITRITE UR QL STRIP: NEGATIVE
NON-SQ EPI CELLS URNS QL MICRO: ABNORMAL /HPF
NRBC BLD AUTO-RTO: 0 /100 WBCS
PH UR STRIP.AUTO: 6 [PH]
PLATELET # BLD AUTO: 134 THOUSANDS/UL (ref 149–390)
PMV BLD AUTO: 11.7 FL (ref 8.9–12.7)
POTASSIUM SERPL-SCNC: 4.3 MMOL/L (ref 3.5–5.3)
PROT SERPL-MCNC: 6.7 G/DL (ref 6.4–8.4)
PROT UR STRIP-MCNC: ABNORMAL MG/DL
PROTHROMBIN TIME: 13.4 SECONDS (ref 12.3–15)
RBC # BLD AUTO: 4.75 MILLION/UL (ref 3.88–5.62)
RBC #/AREA URNS AUTO: ABNORMAL /HPF
SODIUM SERPL-SCNC: 144 MMOL/L (ref 135–147)
SP GR UR STRIP.AUTO: >=1.03 (ref 1–1.03)
UROBILINOGEN UR STRIP-ACNC: <2 MG/DL
WBC # BLD AUTO: 3.85 THOUSAND/UL (ref 4.31–10.16)
WBC #/AREA URNS AUTO: ABNORMAL /HPF

## 2025-06-04 PROCEDURE — 80053 COMPREHEN METABOLIC PANEL: CPT

## 2025-06-04 PROCEDURE — 85730 THROMBOPLASTIN TIME PARTIAL: CPT

## 2025-06-04 PROCEDURE — 85610 PROTHROMBIN TIME: CPT

## 2025-06-04 PROCEDURE — 36415 COLL VENOUS BLD VENIPUNCTURE: CPT

## 2025-06-04 PROCEDURE — 83036 HEMOGLOBIN GLYCOSYLATED A1C: CPT

## 2025-06-04 PROCEDURE — 85025 COMPLETE CBC W/AUTO DIFF WBC: CPT

## 2025-06-04 PROCEDURE — 81001 URINALYSIS AUTO W/SCOPE: CPT

## 2025-06-09 ENCOUNTER — ANESTHESIA EVENT (OUTPATIENT)
Dept: PERIOP | Facility: HOSPITAL | Age: 55
End: 2025-06-09
Payer: MEDICARE

## 2025-06-09 RX ORDER — CHLORAL HYDRATE 500 MG
1000 CAPSULE ORAL DAILY
COMMUNITY

## 2025-06-09 NOTE — ANESTHESIA PREPROCEDURE EVALUATION
Procedure:  RELEASE CARPAL TUNNEL (Right: Wrist)    Relevant Problems   ANESTHESIA (within normal limits)      CARDIO   (+) Chronic systolic congestive heart failure (HCC)   (+) Hyperlipidemia   (+) Premature ventricular contraction      ENDO (within normal limits)      GI/HEPATIC (within normal limits)   (-) Gastroesophageal reflux disease      /RENAL (within normal limits)      HEMATOLOGY (within normal limits)      MUSCULOSKELETAL   (+) Lower back pain      NEURO/PSYCH   (+) Paresthesias      PULMONARY   (-) URI (upper respiratory infection)    Cardiac clearance reviewed: He has a history of nonischemic cardiomyopathy ejection fraction 50%.  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  Underwent ablation but was unable to get all the PVCs secondary to a site that was close to the aorta.    Since her last visit he has been doing well he met with electrophysiology and talked about ICD.  Referrals were made for [facility] but he never got down there.  Functional capacity has been good denies any chest pain, shortness of breath, palpitations, lightheadedness, dizziness, or syncope.  Has been lower extreme edema, PND, orthopnea.  No claudication.  He needs carpal tunnel surgery.  Functional capacity greater than 5-6 METS.    2023 TTE  History    Cardiomyopathy. PVC.     Interpretation Summary  Show Result Comparison     Left Ventricle: Left ventricular cavity size is dilated. Wall thickness is normal. There is eccentric hypertrophy. The left ventricular ejection fraction is 35%. Systolic function is moderately reduced. Global longitudinal strain is reduced at 12%. Diastolic function is normal.    The following segments are hypokinetic: basal inferoseptal and basal inferior.    All other segments are normal.    Right Ventricle: Right ventricular cavity size is dilated.    Left Atrium: The atrium is mildly dilated.    Right Atrium: The atrium is mildly dilated.    Lab Results   Component Value Date    WBC 3.85 (L) 06/04/2025    HGB  15.1 06/04/2025    HCT 44.5 06/04/2025    MCV 94 06/04/2025     (L) 06/04/2025       Lab Results   Component Value Date     03/22/2014    SODIUM 144 06/04/2025    K 4.3 06/04/2025     06/04/2025    CO2 29 06/04/2025    ANIONGAP 7 03/22/2014    AGAP 9 06/04/2025    BUN 18 06/04/2025    CREATININE 0.86 06/04/2025    GLUF 112 (H) 06/04/2025    CALCIUM 9.2 06/04/2025    AST 30 06/04/2025    ALT 28 06/04/2025    ALKPHOS 35 06/04/2025    PROT 7.1 03/21/2014    TP 6.7 06/04/2025    BILITOT 0.65 03/21/2014    TBILI 1.01 (H) 06/04/2025    EGFR 96 06/04/2025     No results found for this or any previous visit (from the past 4464 hours).      Physical Exam    Airway     Mallampati score: II  TM Distance: >3 FB  Neck ROM: full      Cardiovascular  Rhythm: regular, Rate: normal    Dental   No notable dental hx     Pulmonary   Breath sounds clear to auscultation    Neurological    He appears awake, alert and oriented x3.      Other Findings        Anesthesia Plan  ASA Score- 3     Anesthesia Type- IV sedation with anesthesia with ASA Monitors.         Additional Monitors:     Airway Plan: natural airway.           Plan Factors-Exercise tolerance (METS): >4 METS.    Chart reviewed. EKG reviewed.             Obstructive sleep apnea risk education given perioperatively.        Induction- intravenous.    Postoperative Plan- Plan for postoperative opioid use.   Monitoring Plan - Monitoring plan - standard ASA monitoring  Post Operative Pain Plan - plan for postoperative opioid use and multimodal analgesia    Perioperative Resuscitation Plan - Level 1 - Full Code.       Informed Consent- Anesthetic plan and risks discussed with patient.  I personally reviewed this patient with the CRNA. Discussed and agreed on the Anesthesia Plan with the CRNA..      NPO Status:  Vitals Value Taken Time   Date of last liquid 06/10/25 06/10/25 06:08   Time of last liquid 0500 06/10/25 06:08   Date of last solid 06/09/25 06/10/25 06:08    Time of last solid 2300 06/10/25 06:08

## 2025-06-09 NOTE — PRE-PROCEDURE INSTRUCTIONS
Pre-Surgery Instructions:   Medication Instructions    acetaminophen (TYLENOL) 500 mg tablet Uses PRN- OK to take day of surgery    baclofen 10 mg tablet Take day of surgery.    carvedilol (COREG) 25 mg tablet Take day of surgery.    cholecalciferol (VITAMIN D3) 1,000 units tablet Stop taking 5 days prior to surgery.    DAILY MULTIPLE VITAMINS PO Stop taking 5 days prior to surgery.    levothyroxine 125 mcg tablet Take day of surgery.    losartan (COZAAR) 25 mg tablet Hold day of surgery.    Omega-3 Fatty Acids (fish oil) 1,000 mg Stop taking 2 days prior to surgery.   Medication instructions for day of surgery reviewed. Please take all instructed medications with only a sip of water. Please do not take any over the counter (non-prescribed) vitamins or supplements for one week prior to date of surgery.      You will receive a call one business day prior to surgery with an arrival time and hospital directions. If your surgery is scheduled on a Monday, the hospital will be calling you on the Friday prior to your surgery. If you have not heard from anyone by 8pm, please call the hospital supervisor through the hospital  at [phone]. ([place] [phone] or [place] [phone]).    Do not eat or drink anything after midnight the night before your surgery, including candy, mints, lifesavers, or chewing gum. Do not drink alcohol 24hrs before your surgery. Try not to smoke at least 24hrs before your surgery.       Follow the pre surgery showering instructions as listed in the “My Surgical Experience Booklet” or otherwise provided by your surgeon's office. Do not use a blade to shave the surgical area 1 week before surgery. It is okay to use a clean electric clippers up to 24 hours before surgery. Do not apply any lotions, creams, including makeup, cologne, deodorant, or perfumes after showering on the day of your surgery. Do not use dry shampoo, hair spray, hair gel, or any type of hair products.     No  contact lenses, eye make-up, or artificial eyelashes. Remove nail polish, including gel polish, and any artificial, gel, or acrylic nails if possible. Remove all jewelry including rings and body piercing jewelry.     Wear causal clothing that is easy to take on and off. Consider your type of surgery.    Keep any valuables, jewelry, piercings at home. Please bring any specially ordered equipment (sling, braces) if indicated.    Arrange for a responsible person to drive you to and from the hospital on the day of your surgery. Please confirm the visitor policy for the day of your procedure when you receive your phone call with an arrival time.     Call the surgeon's office with any new illnesses, exposures, or additional questions prior to surgery.    Please reference your “My Surgical Experience Booklet” for additional information to prepare for your upcoming surgery.

## 2025-06-10 ENCOUNTER — HOSPITAL ENCOUNTER (OUTPATIENT)
Facility: HOSPITAL | Age: 55
Setting detail: OUTPATIENT SURGERY
Discharge: HOME | End: 2025-06-10
Attending: NEUROLOGICAL SURGERY | Admitting: NEUROLOGICAL SURGERY
Payer: MEDICARE

## 2025-06-10 ENCOUNTER — ANESTHESIA (OUTPATIENT)
Dept: PERIOP | Facility: HOSPITAL | Age: 55
End: 2025-06-10
Payer: MEDICARE

## 2025-06-10 VITALS
SYSTOLIC BLOOD PRESSURE: 112 MMHG | RESPIRATION RATE: 18 BRPM | WEIGHT: 210 LBS | OXYGEN SATURATION: 97 % | HEART RATE: 57 BPM | DIASTOLIC BLOOD PRESSURE: 76 MMHG | HEIGHT: 69 IN | TEMPERATURE: 97.2 F | BODY MASS INDEX: 31.1 KG/M2

## 2025-06-10 DIAGNOSIS — G56.01 RIGHT CARPAL TUNNEL SYNDROME: Primary | ICD-10-CM

## 2025-06-10 PROCEDURE — 64721 CARPAL TUNNEL SURGERY: CPT | Performed by: NEUROLOGICAL SURGERY

## 2025-06-10 RX ORDER — HYDROMORPHONE HCL IN WATER/PF 6 MG/30 ML
0.2 PATIENT CONTROLLED ANALGESIA SYRINGE INTRAVENOUS
Status: DISCONTINUED | OUTPATIENT
Start: 2025-06-10 | End: 2025-06-10 | Stop reason: HOSPADM

## 2025-06-10 RX ORDER — MAGNESIUM HYDROXIDE 1200 MG/15ML
LIQUID ORAL AS NEEDED
Status: DISCONTINUED | OUTPATIENT
Start: 2025-06-10 | End: 2025-06-10 | Stop reason: HOSPADM

## 2025-06-10 RX ORDER — CEFAZOLIN SODIUM 1 G/3ML
INJECTION, POWDER, FOR SOLUTION INTRAMUSCULAR; INTRAVENOUS AS NEEDED
Status: DISCONTINUED | OUTPATIENT
Start: 2025-06-10 | End: 2025-06-10

## 2025-06-10 RX ORDER — CHLORHEXIDINE GLUCONATE ORAL RINSE 1.2 MG/ML
15 SOLUTION DENTAL ONCE
Status: COMPLETED | OUTPATIENT
Start: 2025-06-10 | End: 2025-06-10

## 2025-06-10 RX ORDER — LIDOCAINE HYDROCHLORIDE AND EPINEPHRINE 10; 10 MG/ML; UG/ML
INJECTION, SOLUTION INFILTRATION; PERINEURAL AS NEEDED
Status: DISCONTINUED | OUTPATIENT
Start: 2025-06-10 | End: 2025-06-10 | Stop reason: HOSPADM

## 2025-06-10 RX ORDER — LIDOCAINE HYDROCHLORIDE 10 MG/ML
INJECTION, SOLUTION EPIDURAL; INFILTRATION; INTRACAUDAL; PERINEURAL AS NEEDED
Status: DISCONTINUED | OUTPATIENT
Start: 2025-06-10 | End: 2025-06-10

## 2025-06-10 RX ORDER — SODIUM CHLORIDE, SODIUM LACTATE, POTASSIUM CHLORIDE, CALCIUM CHLORIDE 600; 310; 30; 20 MG/100ML; MG/100ML; MG/100ML; MG/100ML
INJECTION, SOLUTION INTRAVENOUS CONTINUOUS PRN
Status: DISCONTINUED | OUTPATIENT
Start: 2025-06-10 | End: 2025-06-10

## 2025-06-10 RX ORDER — TRAMADOL HYDROCHLORIDE 50 MG/1
50 TABLET ORAL EVERY 6 HOURS PRN
Status: DISCONTINUED | OUTPATIENT
Start: 2025-06-10 | End: 2025-06-10 | Stop reason: HOSPADM

## 2025-06-10 RX ORDER — PROPOFOL 10 MG/ML
INJECTION, EMULSION INTRAVENOUS CONTINUOUS PRN
Status: DISCONTINUED | OUTPATIENT
Start: 2025-06-10 | End: 2025-06-10

## 2025-06-10 RX ORDER — GINSENG 100 MG
CAPSULE ORAL AS NEEDED
Status: DISCONTINUED | OUTPATIENT
Start: 2025-06-10 | End: 2025-06-10 | Stop reason: HOSPADM

## 2025-06-10 RX ORDER — CEFAZOLIN SODIUM 2 G/50ML
2000 SOLUTION INTRAVENOUS ONCE
Status: DISCONTINUED | OUTPATIENT
Start: 2025-06-10 | End: 2025-06-10 | Stop reason: HOSPADM

## 2025-06-10 RX ORDER — PROPOFOL 10 MG/ML
INJECTION, EMULSION INTRAVENOUS AS NEEDED
Status: DISCONTINUED | OUTPATIENT
Start: 2025-06-10 | End: 2025-06-10

## 2025-06-10 RX ORDER — MIDAZOLAM HYDROCHLORIDE 2 MG/2ML
INJECTION, SOLUTION INTRAMUSCULAR; INTRAVENOUS AS NEEDED
Status: DISCONTINUED | OUTPATIENT
Start: 2025-06-10 | End: 2025-06-10

## 2025-06-10 RX ORDER — EPHEDRINE SULFATE 50 MG/ML
INJECTION INTRAVENOUS AS NEEDED
Status: DISCONTINUED | OUTPATIENT
Start: 2025-06-10 | End: 2025-06-10

## 2025-06-10 RX ORDER — FENTANYL CITRATE 50 UG/ML
INJECTION, SOLUTION INTRAMUSCULAR; INTRAVENOUS AS NEEDED
Status: DISCONTINUED | OUTPATIENT
Start: 2025-06-10 | End: 2025-06-10

## 2025-06-10 RX ORDER — ACETAMINOPHEN 325 MG/1
975 TABLET ORAL EVERY 8 HOURS PRN
Status: DISCONTINUED | OUTPATIENT
Start: 2025-06-10 | End: 2025-06-10 | Stop reason: HOSPADM

## 2025-06-10 RX ORDER — TRAMADOL HYDROCHLORIDE 50 MG/1
50 TABLET ORAL EVERY 8 HOURS PRN
Qty: 15 TABLET | Refills: 0 | Status: SHIPPED | OUTPATIENT
Start: 2025-06-10

## 2025-06-10 RX ADMIN — PROPOFOL 30 MG: 10 INJECTION, EMULSION INTRAVENOUS at 07:44

## 2025-06-10 RX ADMIN — CEFAZOLIN 2000 MG: 1 INJECTION, POWDER, FOR SOLUTION INTRAMUSCULAR; INTRAVENOUS at 07:38

## 2025-06-10 RX ADMIN — PROPOFOL 100 MCG/KG/MIN: 10 INJECTION, EMULSION INTRAVENOUS at 07:30

## 2025-06-10 RX ADMIN — MIDAZOLAM 2 MG: 1 INJECTION INTRAMUSCULAR; INTRAVENOUS at 07:31

## 2025-06-10 RX ADMIN — PROPOFOL 50 MG: 10 INJECTION, EMULSION INTRAVENOUS at 07:38

## 2025-06-10 RX ADMIN — EPHEDRINE SULFATE 5 MG: 50 INJECTION, SOLUTION INTRAVENOUS at 08:19

## 2025-06-10 RX ADMIN — SODIUM CHLORIDE, SODIUM LACTATE, POTASSIUM CHLORIDE, AND CALCIUM CHLORIDE: .6; .31; .03; .02 INJECTION, SOLUTION INTRAVENOUS at 07:30

## 2025-06-10 RX ADMIN — FENTANYL CITRATE 25 MCG: 50 INJECTION INTRAMUSCULAR; INTRAVENOUS at 07:38

## 2025-06-10 RX ADMIN — LIDOCAINE HYDROCHLORIDE 50 MG: 10 INJECTION, SOLUTION EPIDURAL; INFILTRATION; INTRACAUDAL; PERINEURAL at 07:34

## 2025-06-10 RX ADMIN — CHLORHEXIDINE GLUCONATE 15 ML: 1.2 SOLUTION ORAL at 06:26

## 2025-06-10 RX ADMIN — PROPOFOL 50 MG: 10 INJECTION, EMULSION INTRAVENOUS at 07:49

## 2025-06-10 NOTE — ANESTHESIA POSTPROCEDURE EVALUATION
Post-Op Assessment Note    CV Status:  Stable  Pain Score: 0    Pain management: adequate       Mental Status:  Awake and alert   Hydration Status:  Stable   PONV Controlled:  None   Airway Patency:  Patent  Two or more mitigation strategies used for obstructive sleep apnea  No anethesia notable event occurred.    Staff: Anesthesiologist           Last Filed PACU Vitals:  Vitals Value Taken Time   Temp 97.0    Pulse 66 06/10/25 08:30   /70    Resp 28 06/10/25 08:30   SpO2 98 % 06/10/25 08:30   Vitals shown include unfiled device data.

## 2025-06-10 NOTE — DISCHARGE INSTR - AVS FIRST PAGE
Outer white dressing off tomorrow. Clear plastic dressing off the following day. May get hand wet but not soak the following day. Sling on for two weeks

## 2025-06-10 NOTE — OP NOTE
OPERATIVE REPORT  PATIENT NAME: Gallo Gallegos    :  1970  MRN: [id number]  Pt Location: [facility] OR ROOM 17    SURGERY DATE: 6/10/2025    Surgeons and Role:     * Dilan Bassett MD - Primary    Preop Diagnosis:  Right carpal tunnel syndrome [G56.01]    Post-Op Diagnosis Codes:     * Right carpal tunnel syndrome [G56.01]    Procedure(s):  Right - RELEASE CARPAL TUNNEL - Redo with scar dissection    Specimen(s):  * No specimens in log *    Estimated Blood Loss:   Minimal    Drains:  * No LDAs found *    Anesthesia Type:   IV Sedation with Anesthesia    Operative Indications:  Right carpal tunnel syndrome [G56.01]  recurrent    Operative Findings:  Very thickened scar especially distally       Complications:   None    Procedure and Technique:  After institution of monitored anesthesia control the right  Hand was placed on a flat top extremity table.  The hand was then thoroughly washed with Betadine soap then DuraPrep extremity drapes were placed and a stockinette was placed over this.      A time-out was called and all parameters a time-out were followed.    Monitored anesthesia control was utilized continuously for during the procedure to aid in  Optimal anesthesia.  In coordination with Anesthesia Department the right hand was injected with 1% lidocaine with 1 100,000 epinephrine a 15. Blade was used to incise the skin directly over the flexor retinaculum.   The intraoperative microscope was used to ai in the identification and illumination of structures.  Dissection was carried out through scar which had drplaced the tissue of the previous flexor retinaculum and a small and retractor was utilized to maintain operative exposure.  The bipolar was utilized to maintain hemostasis.  A new 15. Blade was used to incise the the scar with the use of a grooved dental and Metzenbaum scissors were utilized to extend this into the mid palm and proximal forearm.  At the conclusion of this the nerve was free and a Penfield  number 4 could be easily inserted proximally and distally.     Subdermal layers were closed with interrupted inverted 3 0 Vicryl suture and the skin was closed with interrupted vertical mattress   Four-0 nylon suture.  Clean sterile dressing was placed.   I was present for the entire procedure.    Patient Disposition:  PACU          SIGNATURE: Dilan Bassett MD  DATE: Maribel 10, 2025  TIME: 8:33 AM

## 2025-06-13 ENCOUNTER — TELEPHONE (OUTPATIENT)
Dept: NEUROSURGERY | Facility: CLINIC | Age: 55
End: 2025-06-13

## 2025-06-16 ENCOUNTER — TELEPHONE (OUTPATIENT)
Age: 55
End: 2025-06-16

## 2025-06-24 ENCOUNTER — CLINICAL SUPPORT (OUTPATIENT)
Dept: NEUROSURGERY | Facility: CLINIC | Age: 55
End: 2025-06-24

## 2025-06-24 VITALS — DIASTOLIC BLOOD PRESSURE: 60 MMHG | SYSTOLIC BLOOD PRESSURE: 120 MMHG

## 2025-06-24 DIAGNOSIS — Z98.890 POST-OPERATIVE STATE: Primary | ICD-10-CM

## 2025-06-24 PROCEDURE — 99024 POSTOP FOLLOW-UP VISIT: CPT | Performed by: NEUROLOGICAL SURGERY

## 2025-06-24 NOTE — PROGRESS NOTES
Post-Op Visit- Neurosurgery    Gallo Gallegos 55 y.o. male MRN: [id number]    Chief Complaint:  Patient presents post: RELEASE CARPAL TUNNEL - Redo with scar dissection - Right    History of Present Illness:  Patient presents for 2 week POV for incision check.    Gallo arrived accompanied by his wife and ambulated well without assistive device. He is wearing a cock-up splint and shoulder sling. He reports pain is 0/10 and is feeling well overall aside from chronic neck pain.     He questions if it is advisable that he continue to wear the cock-up splint and would like to know what Dr. Bassett thinks about when to wear this. He expressed concern about needing the surgery again.     Assessment:   Vitals:    06/24/25 1056   BP: 120/60       Wound Exam: Incision is clean, dry, and in tact, well approximated, without edema, erythema, or drainage. See below:         Procedure:  Staple/suture removal.   Procedure Note: Cleansed the surgical incision with CHG swab before all nylon sutures removed. Covered loosely with 4x4 before replacing cock-up splint.   Patient Status: the patient tolerated the procedure well.  Complications: None.       Discussion/Summary:  Reviewed incision care with patient including daily observation for s/s infection including: increased erythema, edema, drainage, dehiscence of incision or fever >101.  Should these be observed, he understands that he is to call and/or return immediately for reassessment.  Advised patient to continue cleansing area with mild soap and water and pat dry. Not to apply any lotions, creams, or ointments, & not to submerge in any water for 4 more weeks.     He is to maintain activity restrictions until cleared by the surgeon. Activity levels were also reviewed with the patient in detail, he is to avoid heavy lifting and repetitive activities.     Verified date/time/location of upcoming POV. He is to  call the office with any further questions or concerns, or if any incisional issues or fevers would arise.

## 2025-06-24 NOTE — PATIENT INSTRUCTIONS
1. Wash incision gently in the shower. Avoid submerging in tub, hot tub, or pool.  2. Leave incision open to air when ever possible, may cover loosely with gauze and paper tape for comfort. Do not seal incision under bandages.    3. Do not apply any creams, ointments, or lotions directly to incision.   4. Maintain activity restrictions of lifting, pushing, pulling no more than 5-10 pounds.   5. Ambulate as tolerated.   6. Return for follow-up visit on 7/22/2025 8:45 am   7. Contact the office with any questions or concerns.

## 2025-06-25 ENCOUNTER — TELEPHONE (OUTPATIENT)
Dept: NEUROSURGERY | Facility: CLINIC | Age: 55
End: 2025-06-25

## 2025-06-25 NOTE — TELEPHONE ENCOUNTER
Patient called in with extensive questions regarding his surgery and recover and how to prevent scar tissue from recurring. He had 2 week pov yesterday 6/24 and sutures were removed.He discussed how he had the same surgery a year ago and then spent the winter months chopping wood for his wood stove and he is concerned that this is going to keep happening to him. Explained to the patient that scar tissue formation is multifactoral and can be influenced by age and genetics. Encouraged him to continue with his activity restrictions and to write down his more specific questions down in preparation for his 6 week pov with Dr. Bassett on 7/22. He was appreciative of the assistance.

## 2025-06-30 ENCOUNTER — HOSPITAL ENCOUNTER (OUTPATIENT)
Dept: NON INVASIVE DIAGNOSTICS | Facility: CLINIC | Age: 55
Discharge: HOME | End: 2025-06-30
Attending: INTERNAL MEDICINE
Payer: MEDICARE

## 2025-06-30 VITALS
DIASTOLIC BLOOD PRESSURE: 60 MMHG | WEIGHT: 210 LBS | BODY MASS INDEX: 31.1 KG/M2 | HEART RATE: 57 BPM | SYSTOLIC BLOOD PRESSURE: 120 MMHG | HEIGHT: 69 IN

## 2025-06-30 DIAGNOSIS — I42.9 CARDIOMYOPATHY, UNSPECIFIED TYPE (HCC): ICD-10-CM

## 2025-06-30 LAB
AORTIC ROOT: 3.8 CM
ASCENDING AORTA: 3.7 CM
BSA FOR ECHO PROCEDURE: 2.11 M2
E WAVE DECELERATION TIME: 252 MS
E/A RATIO: 1.81
FRACTIONAL SHORTENING: 24 (ref 28–44)
INTERVENTRICULAR SEPTUM IN DIASTOLE (PARASTERNAL SHORT AXIS VIEW): 1.1 CM
INTERVENTRICULAR SEPTUM: 1.1 CM (ref 0.6–1.1)
LAAS-AP2: 26.7 CM2
LAAS-AP4: 20.3 CM2
LEFT ATRIUM SIZE: 3.5 CM
LEFT ATRIUM VOLUME (MOD BIPLANE): 82 ML
LEFT ATRIUM VOLUME INDEX (MOD BIPLANE): 38.9 ML/M2
LEFT INTERNAL DIMENSION IN SYSTOLE: 5.5 CM (ref 2.1–4)
LEFT VENTRICULAR INTERNAL DIMENSION IN DIASTOLE: 7.2 CM (ref 3.5–6)
LEFT VENTRICULAR POSTERIOR WALL IN END DIASTOLE: 1 CM
LEFT VENTRICULAR STROKE VOLUME: 126 ML
LV EF US.2D.A4C+ESTIMATED: 41 %
LVSV (TEICH): 126 ML
MV E'TISSUE VEL-LAT: 8 CM/S
MV E'TISSUE VEL-SEP: 6 CM/S
MV PEAK A VEL: 0.32 M/S
MV PEAK E VEL: 58 CM/S
MV STENOSIS PRESSURE HALF TIME: 73 MS
MV VALVE AREA P 1/2 METHOD: 3.01
RIGHT ATRIUM AREA SYSTOLE A4C: 16.1 CM2
RIGHT VENTRICLE ID DIMENSION: 4.3 CM
SL CV LEFT ATRIUM LENGTH A2C: 5.6 CM
SL CV LV EF: 30
SL CV PED ECHO LEFT VENTRICLE DIASTOLIC VOLUME (MOD BIPLANE) 2D: 273 ML
SL CV PED ECHO LEFT VENTRICLE SYSTOLIC VOLUME (MOD BIPLANE) 2D: 147 ML
TRICUSPID ANNULAR PLANE SYSTOLIC EXCURSION: 2.1 CM

## 2025-06-30 PROCEDURE — 93306 TTE W/DOPPLER COMPLETE: CPT | Performed by: INTERNAL MEDICINE

## 2025-06-30 PROCEDURE — 93306 TTE W/DOPPLER COMPLETE: CPT

## 2025-07-07 ENCOUNTER — TELEPHONE (OUTPATIENT)
Dept: NEUROSURGERY | Facility: CLINIC | Age: 55
End: 2025-07-07

## 2025-07-07 NOTE — TELEPHONE ENCOUNTER
At family function yesterday and a lot of pts when they have PT after carpal tunnel surgery.      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     This RN informed patient that generally that would wait the 6 week POV.      Pt requesting this RN to reach out to provider to double check as pt is motivated to do what he needs to do to improve things and hopefully not need this to be repeated again.      This RN to reach out to provider and then return call to pt with in next 24-48 hours.  Pt agreeable with timeline and appreciative to this RN as he did not want to wait till 6 week POV if he did not have to.

## 2025-07-10 ENCOUNTER — TELEPHONE (OUTPATIENT)
Age: 55
End: 2025-07-10

## 2025-07-10 DIAGNOSIS — G56.01 RIGHT CARPAL TUNNEL SYNDROME: ICD-10-CM

## 2025-07-10 DIAGNOSIS — Z98.890 POST-OPERATIVE STATE: ICD-10-CM

## 2025-07-10 DIAGNOSIS — G56.01 CARPAL TUNNEL SYNDROME OF RIGHT WRIST: Primary | ICD-10-CM

## 2025-07-10 NOTE — TELEPHONE ENCOUNTER
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

## 2025-07-10 NOTE — TELEPHONE ENCOUNTER
"Patient phoned in to f/u on query regarding PT.  This RN was able to provide patient with surgeons response\" If incision is well healed then he can go to OT for ROM and strengthening of the Hand with accent on FMC\"    This RN noted that she was to place referral this day and was going to call pt then with update but her phoned in first. This RN completed placing referral with pt on the line looking for FAX number to send referral to.      Referral was placed, pt provided the below contact to send referral.      [facility] Contact   FAX Number [phone]  Phone [phone]   Patient noted he had Spoke with Yessica        Patient noting that he is concerned about strength and new onset pain in left shoulder from over use and compensation for his right hand restrictions.  He is thinking he would need a different referral for that.  This RN noted that he likely would and that a provider would want to meet with pt and consult and evaluate patient prior to providing referral.  Pt setated understanding.      Plan is for this RN to fax referral, receive confirmation, call YESSICA and ensure she received referral- and then per pt request mail letter home. Pt is going to get initial consult out of the way prior to his f/u with DKO on 7/22/2025.  Pt to query about shoulder at consult and then as needed address at apt with  DKO. This RN to call pt back prior to noon with confirmation of completion of tasks noted.    Get stabbing pain in front of shoulder when reaching out     This RN verified address on file.     "

## 2025-07-22 ENCOUNTER — OFFICE VISIT (OUTPATIENT)
Dept: NEUROSURGERY | Facility: CLINIC | Age: 55
End: 2025-07-22

## 2025-07-22 VITALS
HEIGHT: 69 IN | DIASTOLIC BLOOD PRESSURE: 68 MMHG | SYSTOLIC BLOOD PRESSURE: 100 MMHG | OXYGEN SATURATION: 97 % | RESPIRATION RATE: 16 BRPM | WEIGHT: 208 LBS | BODY MASS INDEX: 30.81 KG/M2 | HEART RATE: 65 BPM | TEMPERATURE: 97.6 F

## 2025-07-22 DIAGNOSIS — Z09 POSTOPERATIVE EXAMINATION: Primary | ICD-10-CM

## 2025-07-22 DIAGNOSIS — M25.512 LEFT SHOULDER PAIN: ICD-10-CM

## 2025-07-22 PROCEDURE — 99024 POSTOP FOLLOW-UP VISIT: CPT | Performed by: NEUROLOGICAL SURGERY
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
## (undated) DEVICE — ARM SLING: Brand: DEROYAL

## (undated) DEVICE — BETHLEHEM UNIVERSAL  MIONR EXT: Brand: CARDINAL HEALTH

## (undated) DEVICE — TELFA NON-ADHERENT ABSORBENT DRESSING: Brand: TELFA

## (undated) DEVICE — SKN PRP WNG SPNGE PVP SCRB STR: Brand: MEDLINE INDUSTRIES, INC.

## (undated) DEVICE — SUT VICRYL PLUS 3-0 RB-1 CR/8 18 IN VCP713D

## (undated) DEVICE — TIBURON HAND DRAPE: Brand: CONVERTORS

## (undated) DEVICE — DRAPE MICROSCOPE OPMI PENTERO

## (undated) DEVICE — 3M™ TEGADERM™ TRANSPARENT FILM DRESSING FRAME STYLE, 1624W, 2-3/8 IN X 2-3/4 IN (6 CM X 7 CM), 100/CT 4CT/CASE: Brand: 3M™ TEGADERM™

## (undated) DEVICE — NEEDLE 25G X 1 1/2

## (undated) DEVICE — GLOVE INDICATOR PI UNDERGLOVE SZ 8.5 BLUE

## (undated) DEVICE — SUT ETHILON 4-0 PS-2 18 IN 1667G

## (undated) DEVICE — STOCKINETTE: Brand: DEROYAL

## (undated) DEVICE — GLOVE SRG BIOGEL 8

## (undated) DEVICE — CORD BIPOLAR 12FT REUSEABLE

## (undated) DEVICE — STOCKINETTE REGULAR

## (undated) DEVICE — SYRINGE 10ML LL

## (undated) DEVICE — PREP SURGICAL PURPREP 26ML

## (undated) DEVICE — CURITY STRETCH BANDAGE: Brand: CURITY

## (undated) DEVICE — GAUZE SPONGES,16 PLY: Brand: CURITY

## (undated) DEVICE — INTENDED FOR TISSUE SEPARATION, AND OTHER PROCEDURES THAT REQUIRE A SHARP SURGICAL BLADE TO PUNCTURE OR CUT.: Brand: BARD-PARKER SAFETY BLADES SIZE 15, STERILE

## (undated) DEVICE — DRESSING TELFA 2 X 3 IN STRL

## (undated) DEVICE — BIPOLAR CORD DISP

## (undated) DEVICE — GLOVE INDICATOR PI UNDERGLOVE SZ 7 BLUE